# Patient Record
Sex: MALE | Race: WHITE | NOT HISPANIC OR LATINO
[De-identification: names, ages, dates, MRNs, and addresses within clinical notes are randomized per-mention and may not be internally consistent; named-entity substitution may affect disease eponyms.]

---

## 2017-04-17 PROBLEM — Z00.00 ENCOUNTER FOR PREVENTIVE HEALTH EXAMINATION: Status: ACTIVE | Noted: 2017-04-17

## 2017-04-24 ENCOUNTER — APPOINTMENT (OUTPATIENT)
Dept: ORTHOPEDIC SURGERY | Facility: CLINIC | Age: 77
End: 2017-04-24

## 2017-04-24 VITALS — HEIGHT: 70 IN | BODY MASS INDEX: 33.5 KG/M2 | WEIGHT: 234 LBS

## 2017-04-24 VITALS — HEART RATE: 63 BPM | SYSTOLIC BLOOD PRESSURE: 184 MMHG | DIASTOLIC BLOOD PRESSURE: 82 MMHG

## 2017-04-24 DIAGNOSIS — M54.5 LOW BACK PAIN: ICD-10-CM

## 2018-03-27 ENCOUNTER — OUTPATIENT (OUTPATIENT)
Dept: OUTPATIENT SERVICES | Facility: HOSPITAL | Age: 78
LOS: 1 days | End: 2018-03-27
Payer: MEDICARE

## 2018-03-27 DIAGNOSIS — M54.16 RADICULOPATHY, LUMBAR REGION: ICD-10-CM

## 2018-03-27 PROCEDURE — 77003 FLUOROGUIDE FOR SPINE INJECT: CPT

## 2018-03-27 PROCEDURE — 62323 NJX INTERLAMINAR LMBR/SAC: CPT

## 2018-06-11 ENCOUNTER — APPOINTMENT (OUTPATIENT)
Dept: ORTHOPEDIC SURGERY | Facility: CLINIC | Age: 78
End: 2018-06-11
Payer: MEDICARE

## 2018-06-11 PROCEDURE — 99214 OFFICE O/P EST MOD 30 MIN: CPT

## 2018-06-28 ENCOUNTER — CHART COPY (OUTPATIENT)
Age: 78
End: 2018-06-28

## 2018-08-01 ENCOUNTER — OUTPATIENT (OUTPATIENT)
Dept: OUTPATIENT SERVICES | Facility: HOSPITAL | Age: 78
LOS: 1 days | End: 2018-08-01
Payer: MEDICARE

## 2018-08-01 VITALS
WEIGHT: 248.02 LBS | TEMPERATURE: 98 F | HEART RATE: 67 BPM | OXYGEN SATURATION: 97 % | SYSTOLIC BLOOD PRESSURE: 154 MMHG | DIASTOLIC BLOOD PRESSURE: 86 MMHG | HEIGHT: 69.5 IN

## 2018-08-01 DIAGNOSIS — H26.9 UNSPECIFIED CATARACT: Chronic | ICD-10-CM

## 2018-08-01 DIAGNOSIS — R22.9 LOCALIZED SWELLING, MASS AND LUMP, UNSPECIFIED: Chronic | ICD-10-CM

## 2018-08-01 DIAGNOSIS — M51.06 INTERVERTEBRAL DISC DISORDERS WITH MYELOPATHY, LUMBAR REGION: ICD-10-CM

## 2018-08-01 DIAGNOSIS — R06.02 SHORTNESS OF BREATH: ICD-10-CM

## 2018-08-01 DIAGNOSIS — R06.83 SNORING: ICD-10-CM

## 2018-08-01 DIAGNOSIS — M48.07 SPINAL STENOSIS, LUMBOSACRAL REGION: ICD-10-CM

## 2018-08-01 LAB
APTT BLD: 29.6 SEC — SIGNIFICANT CHANGE UP (ref 27.5–37.4)
BLD GP AB SCN SERPL QL: NEGATIVE — SIGNIFICANT CHANGE UP
BUN SERPL-MCNC: 20 MG/DL — SIGNIFICANT CHANGE UP (ref 7–23)
CALCIUM SERPL-MCNC: 9.7 MG/DL — SIGNIFICANT CHANGE UP (ref 8.4–10.5)
CHLORIDE SERPL-SCNC: 106 MMOL/L — SIGNIFICANT CHANGE UP (ref 98–107)
CO2 SERPL-SCNC: 27 MMOL/L — SIGNIFICANT CHANGE UP (ref 22–31)
CREAT SERPL-MCNC: 1.25 MG/DL — SIGNIFICANT CHANGE UP (ref 0.5–1.3)
GLUCOSE SERPL-MCNC: 97 MG/DL — SIGNIFICANT CHANGE UP (ref 70–99)
HCT VFR BLD CALC: 42 % — SIGNIFICANT CHANGE UP (ref 39–50)
HGB BLD-MCNC: 13.5 G/DL — SIGNIFICANT CHANGE UP (ref 13–17)
INR BLD: 1.07 — SIGNIFICANT CHANGE UP (ref 0.88–1.17)
MCHC RBC-ENTMCNC: 29.9 PG — SIGNIFICANT CHANGE UP (ref 27–34)
MCHC RBC-ENTMCNC: 32.1 % — SIGNIFICANT CHANGE UP (ref 32–36)
MCV RBC AUTO: 93.1 FL — SIGNIFICANT CHANGE UP (ref 80–100)
NRBC # FLD: 0 — SIGNIFICANT CHANGE UP
PLATELET # BLD AUTO: 186 K/UL — SIGNIFICANT CHANGE UP (ref 150–400)
PMV BLD: 11 FL — SIGNIFICANT CHANGE UP (ref 7–13)
POTASSIUM SERPL-MCNC: 5 MMOL/L — SIGNIFICANT CHANGE UP (ref 3.5–5.3)
POTASSIUM SERPL-SCNC: 5 MMOL/L — SIGNIFICANT CHANGE UP (ref 3.5–5.3)
PROTHROM AB SERPL-ACNC: 12.3 SEC — SIGNIFICANT CHANGE UP (ref 9.8–13.1)
RBC # BLD: 4.51 M/UL — SIGNIFICANT CHANGE UP (ref 4.2–5.8)
RBC # FLD: 13.2 % — SIGNIFICANT CHANGE UP (ref 10.3–14.5)
RH IG SCN BLD-IMP: POSITIVE — SIGNIFICANT CHANGE UP
SODIUM SERPL-SCNC: 144 MMOL/L — SIGNIFICANT CHANGE UP (ref 135–145)
WBC # BLD: 6.78 K/UL — SIGNIFICANT CHANGE UP (ref 3.8–10.5)
WBC # FLD AUTO: 6.78 K/UL — SIGNIFICANT CHANGE UP (ref 3.8–10.5)

## 2018-08-01 PROCEDURE — 93010 ELECTROCARDIOGRAM REPORT: CPT

## 2018-08-01 NOTE — H&P PST ADULT - FAMILY HISTORY
Father  Still living? No  Family history of myocardial infarction, Age at diagnosis: Age Unknown     Mother  Still living? No  Family history of Alzheimer's disease, Age at diagnosis: Age Unknown

## 2018-08-01 NOTE — H&P PST ADULT - PMH
Disc disease, degenerative, lumbar or lumbosacral  2018  GERD (gastroesophageal reflux disease)    Hard of hearing  b/l hearing aids  Hypercholesterolemia    Hypertension    Obesity    Snoring  MARCOS precautions -- responds affirmatively to STOP BANG questionnaire Cataracts, bilateral    Disc disease, degenerative, lumbar or lumbosacral  2018  GERD (gastroesophageal reflux disease)    Hard of hearing  b/l hearing aids  Hypercholesterolemia    Hypertension    Obesity    Snoring  MARCOS precautions -- responds affirmatively to STOP BANG questionnaire

## 2018-08-01 NOTE — H&P PST ADULT - PSH
Mass  excision of laryngeal mass Cataracts, both eyes  have lenses  Mass  excision of laryngeal mass

## 2018-08-01 NOTE — H&P PST ADULT - NSANTHOSAYNRD_GEN_A_CORE
No. MARCOS screening performed.  STOP BANG Legend: 0-2 = LOW Risk; 3-4 = INTERMEDIATE Risk; 5-8 = HIGH Risk

## 2018-08-01 NOTE — H&P PST ADULT - LYMPHATIC
supraclavicular R/supraclavicular L/posterior cervical R/posterior cervical L/anterior cervical L/anterior cervical R

## 2018-08-01 NOTE — H&P PST ADULT - PROBLEM SELECTOR PLAN 3
Await cardiac evaluation which is to be arranged by pcp  Need to notify surgeon of pre op cardiac evaluation  * Instructed to take normal am dose of omeprazole the am of surgery  * Last aspirin on 7-31-18 Await cardiac evaluation which is to be arranged by pcp  Need to notify surgeon of pre op cardiac evaluation--Kellie in surgeon's office informed of preop cardiac evaluation  * Instructed to take normal am dose of omeprazole the am of surgery  * Last aspirin on 7-31-18

## 2018-08-01 NOTE — H&P PST ADULT - PROBLEM SELECTOR PLAN 1
This is a 77 y/o male who is scheduled for L2--5 posterior lumbar laminectomy on 8-16-18  * Given pre op scrub cleanser

## 2018-08-01 NOTE — H&P PST ADULT - HISTORY OF PRESENT ILLNESS
This is a 77 y/o male who presents with progressively worsening symptomatolgy of lumbar disc disease. Xrays and MR confirm pathology. Scheduled for L2-5 posterior lumbar laminectomy on 8-16-18

## 2018-08-02 LAB — SPECIMEN SOURCE: SIGNIFICANT CHANGE UP

## 2018-08-03 PROBLEM — M51.37 OTHER INTERVERTEBRAL DISC DEGENERATION, LUMBOSACRAL REGION: Chronic | Status: ACTIVE | Noted: 2018-08-01

## 2018-08-03 PROBLEM — K21.9 GASTRO-ESOPHAGEAL REFLUX DISEASE WITHOUT ESOPHAGITIS: Chronic | Status: ACTIVE | Noted: 2018-08-01

## 2018-08-03 PROBLEM — E78.00 PURE HYPERCHOLESTEROLEMIA, UNSPECIFIED: Chronic | Status: ACTIVE | Noted: 2018-08-01

## 2018-08-03 PROBLEM — E66.9 OBESITY, UNSPECIFIED: Chronic | Status: ACTIVE | Noted: 2018-08-01

## 2018-08-03 PROBLEM — R06.83 SNORING: Chronic | Status: ACTIVE | Noted: 2018-08-01

## 2018-08-03 PROBLEM — H26.9 UNSPECIFIED CATARACT: Chronic | Status: ACTIVE | Noted: 2018-08-01

## 2018-08-03 PROBLEM — I10 ESSENTIAL (PRIMARY) HYPERTENSION: Chronic | Status: ACTIVE | Noted: 2018-08-01

## 2018-08-03 PROBLEM — H91.90 UNSPECIFIED HEARING LOSS, UNSPECIFIED EAR: Chronic | Status: ACTIVE | Noted: 2018-08-01

## 2018-08-04 LAB — BACTERIA NPH CULT: SIGNIFICANT CHANGE UP

## 2018-08-07 ENCOUNTER — NON-APPOINTMENT (OUTPATIENT)
Age: 78
End: 2018-08-07

## 2018-08-07 ENCOUNTER — APPOINTMENT (OUTPATIENT)
Dept: CARDIOLOGY | Facility: CLINIC | Age: 78
End: 2018-08-07
Payer: MEDICARE

## 2018-08-07 VITALS
WEIGHT: 250 LBS | SYSTOLIC BLOOD PRESSURE: 176 MMHG | OXYGEN SATURATION: 96 % | HEART RATE: 86 BPM | DIASTOLIC BLOOD PRESSURE: 80 MMHG | HEIGHT: 70 IN | BODY MASS INDEX: 35.79 KG/M2

## 2018-08-07 DIAGNOSIS — Z82.49 FAMILY HISTORY OF ISCHEMIC HEART DISEASE AND OTHER DISEASES OF THE CIRCULATORY SYSTEM: ICD-10-CM

## 2018-08-07 DIAGNOSIS — Z87.891 PERSONAL HISTORY OF NICOTINE DEPENDENCE: ICD-10-CM

## 2018-08-07 PROCEDURE — 93000 ELECTROCARDIOGRAM COMPLETE: CPT

## 2018-08-07 PROCEDURE — 99205 OFFICE O/P NEW HI 60 MIN: CPT

## 2018-08-07 RX ORDER — OMEPRAZOLE 20 MG/1
TABLET, DELAYED RELEASE ORAL
Refills: 0 | Status: ACTIVE | COMMUNITY

## 2018-08-07 RX ORDER — LOSARTAN POTASSIUM AND HYDROCHLOROTHIAZIDE 12.5; 1 MG/1; MG/1
100-12.5 TABLET ORAL
Refills: 0 | Status: ACTIVE | COMMUNITY

## 2018-08-08 ENCOUNTER — APPOINTMENT (OUTPATIENT)
Dept: CARDIOLOGY | Facility: CLINIC | Age: 78
End: 2018-08-08
Payer: MEDICARE

## 2018-08-08 PROCEDURE — 93015 CV STRESS TEST SUPVJ I&R: CPT

## 2018-08-08 PROCEDURE — 78452 HT MUSCLE IMAGE SPECT MULT: CPT

## 2018-08-08 PROCEDURE — A9500: CPT

## 2018-08-13 ENCOUNTER — CHART COPY (OUTPATIENT)
Age: 78
End: 2018-08-13

## 2018-08-13 ENCOUNTER — APPOINTMENT (OUTPATIENT)
Dept: ORTHOPEDIC SURGERY | Facility: CLINIC | Age: 78
End: 2018-08-13
Payer: MEDICARE

## 2018-08-13 VITALS
WEIGHT: 250 LBS | DIASTOLIC BLOOD PRESSURE: 70 MMHG | HEIGHT: 70 IN | SYSTOLIC BLOOD PRESSURE: 181 MMHG | HEART RATE: 71 BPM | BODY MASS INDEX: 35.79 KG/M2

## 2018-08-13 PROCEDURE — 99214 OFFICE O/P EST MOD 30 MIN: CPT | Mod: PD

## 2018-08-15 ENCOUNTER — TRANSCRIPTION ENCOUNTER (OUTPATIENT)
Age: 78
End: 2018-08-15

## 2018-08-15 NOTE — ASU PATIENT PROFILE, ADULT - PMH
Cataracts, bilateral    Disc disease, degenerative, lumbar or lumbosacral  2018  GERD (gastroesophageal reflux disease)    Hard of hearing  b/l hearing aids  Hypercholesterolemia    Hypertension    Obesity    Snoring  MARCOS precautions -- responds affirmatively to STOP BANG questionnaire

## 2018-08-15 NOTE — ASU PATIENT PROFILE, ADULT - NSALCOHOLLASTUSE_GEN_A_CORE_DT
11-Aug-2018 Product 4 Application Directions: Apply to affected affected area twice daily as needed.

## 2018-08-16 ENCOUNTER — RESULT REVIEW (OUTPATIENT)
Age: 78
End: 2018-08-16

## 2018-08-16 ENCOUNTER — APPOINTMENT (OUTPATIENT)
Dept: ORTHOPEDIC SURGERY | Facility: HOSPITAL | Age: 78
End: 2018-08-16
Payer: MEDICARE

## 2018-08-16 ENCOUNTER — INPATIENT (INPATIENT)
Facility: HOSPITAL | Age: 78
LOS: 9 days | Discharge: HOME HEALTH | End: 2018-08-26
Attending: STUDENT IN AN ORGANIZED HEALTH CARE EDUCATION/TRAINING PROGRAM | Admitting: STUDENT IN AN ORGANIZED HEALTH CARE EDUCATION/TRAINING PROGRAM
Payer: MEDICARE

## 2018-08-16 VITALS
RESPIRATION RATE: 16 BRPM | HEIGHT: 69.5 IN | OXYGEN SATURATION: 97 % | TEMPERATURE: 98 F | WEIGHT: 248.02 LBS | SYSTOLIC BLOOD PRESSURE: 149 MMHG | HEART RATE: 86 BPM | DIASTOLIC BLOOD PRESSURE: 76 MMHG

## 2018-08-16 DIAGNOSIS — H26.9 UNSPECIFIED CATARACT: Chronic | ICD-10-CM

## 2018-08-16 DIAGNOSIS — M48.07 SPINAL STENOSIS, LUMBOSACRAL REGION: ICD-10-CM

## 2018-08-16 DIAGNOSIS — R22.9 LOCALIZED SWELLING, MASS AND LUMP, UNSPECIFIED: Chronic | ICD-10-CM

## 2018-08-16 LAB
BASOPHILS # BLD AUTO: 0.03 K/UL — SIGNIFICANT CHANGE UP (ref 0–0.2)
BASOPHILS NFR BLD AUTO: 0.3 % — SIGNIFICANT CHANGE UP (ref 0–2)
BUN SERPL-MCNC: 20 MG/DL — SIGNIFICANT CHANGE UP (ref 7–23)
CALCIUM SERPL-MCNC: 8.8 MG/DL — SIGNIFICANT CHANGE UP (ref 8.4–10.5)
CHLORIDE SERPL-SCNC: 105 MMOL/L — SIGNIFICANT CHANGE UP (ref 98–107)
CO2 SERPL-SCNC: 23 MMOL/L — SIGNIFICANT CHANGE UP (ref 22–31)
CREAT SERPL-MCNC: 1.15 MG/DL — SIGNIFICANT CHANGE UP (ref 0.5–1.3)
EOSINOPHIL # BLD AUTO: 0.04 K/UL — SIGNIFICANT CHANGE UP (ref 0–0.5)
EOSINOPHIL NFR BLD AUTO: 0.4 % — SIGNIFICANT CHANGE UP (ref 0–6)
GLUCOSE SERPL-MCNC: 139 MG/DL — HIGH (ref 70–99)
HCT VFR BLD CALC: 34.5 % — LOW (ref 39–50)
HGB BLD-MCNC: 11.2 G/DL — LOW (ref 13–17)
IMM GRANULOCYTES # BLD AUTO: 0.05 # — SIGNIFICANT CHANGE UP
IMM GRANULOCYTES NFR BLD AUTO: 0.5 % — SIGNIFICANT CHANGE UP (ref 0–1.5)
LYMPHOCYTES # BLD AUTO: 0.75 K/UL — LOW (ref 1–3.3)
LYMPHOCYTES # BLD AUTO: 7.1 % — LOW (ref 13–44)
MCHC RBC-ENTMCNC: 30.1 PG — SIGNIFICANT CHANGE UP (ref 27–34)
MCHC RBC-ENTMCNC: 32.5 % — SIGNIFICANT CHANGE UP (ref 32–36)
MCV RBC AUTO: 92.7 FL — SIGNIFICANT CHANGE UP (ref 80–100)
MONOCYTES # BLD AUTO: 0.27 K/UL — SIGNIFICANT CHANGE UP (ref 0–0.9)
MONOCYTES NFR BLD AUTO: 2.6 % — SIGNIFICANT CHANGE UP (ref 2–14)
NEUTROPHILS # BLD AUTO: 9.35 K/UL — HIGH (ref 1.8–7.4)
NEUTROPHILS NFR BLD AUTO: 89.1 % — HIGH (ref 43–77)
NRBC # FLD: 0 — SIGNIFICANT CHANGE UP
PLATELET # BLD AUTO: 160 K/UL — SIGNIFICANT CHANGE UP (ref 150–400)
PMV BLD: 10.6 FL — SIGNIFICANT CHANGE UP (ref 7–13)
POTASSIUM SERPL-MCNC: 3.9 MMOL/L — SIGNIFICANT CHANGE UP (ref 3.5–5.3)
POTASSIUM SERPL-SCNC: 3.9 MMOL/L — SIGNIFICANT CHANGE UP (ref 3.5–5.3)
RBC # BLD: 3.72 M/UL — LOW (ref 4.2–5.8)
RBC # FLD: 13.4 % — SIGNIFICANT CHANGE UP (ref 10.3–14.5)
RH IG SCN BLD-IMP: POSITIVE — SIGNIFICANT CHANGE UP
SODIUM SERPL-SCNC: 140 MMOL/L — SIGNIFICANT CHANGE UP (ref 135–145)
WBC # BLD: 10.49 K/UL — SIGNIFICANT CHANGE UP (ref 3.8–10.5)
WBC # FLD AUTO: 10.49 K/UL — SIGNIFICANT CHANGE UP (ref 3.8–10.5)

## 2018-08-16 PROCEDURE — 72100 X-RAY EXAM L-S SPINE 2/3 VWS: CPT | Mod: 26

## 2018-08-16 PROCEDURE — 63030 LAMOT DCMPRN NRV RT 1 LMBR: CPT | Mod: 80,59

## 2018-08-16 PROCEDURE — 63047 LAM FACETEC & FORAMOT LUMBAR: CPT

## 2018-08-16 PROCEDURE — 63047 LAM FACETEC & FORAMOT LUMBAR: CPT | Mod: 80

## 2018-08-16 PROCEDURE — 63048 LAM FACETEC &FORAMOT EA ADDL: CPT

## 2018-08-16 PROCEDURE — 63048 LAM FACETEC &FORAMOT EA ADDL: CPT | Mod: 80

## 2018-08-16 PROCEDURE — 88304 TISSUE EXAM BY PATHOLOGIST: CPT | Mod: 26

## 2018-08-16 PROCEDURE — 63030 LAMOT DCMPRN NRV RT 1 LMBR: CPT | Mod: 59

## 2018-08-16 PROCEDURE — 88311 DECALCIFY TISSUE: CPT | Mod: 26

## 2018-08-16 RX ORDER — ATORVASTATIN CALCIUM 80 MG/1
20 TABLET, FILM COATED ORAL AT BEDTIME
Qty: 0 | Refills: 0 | Status: DISCONTINUED | OUTPATIENT
Start: 2018-08-16 | End: 2018-08-26

## 2018-08-16 RX ORDER — OXYCODONE HYDROCHLORIDE 5 MG/1
10 TABLET ORAL EVERY 4 HOURS
Qty: 0 | Refills: 0 | Status: DISCONTINUED | OUTPATIENT
Start: 2018-08-16 | End: 2018-08-20

## 2018-08-16 RX ORDER — DOCUSATE SODIUM 100 MG
100 CAPSULE ORAL THREE TIMES A DAY
Qty: 0 | Refills: 0 | Status: DISCONTINUED | OUTPATIENT
Start: 2018-08-16 | End: 2018-08-26

## 2018-08-16 RX ORDER — LOSARTAN POTASSIUM 100 MG/1
50 TABLET, FILM COATED ORAL DAILY
Qty: 0 | Refills: 0 | Status: DISCONTINUED | OUTPATIENT
Start: 2018-08-16 | End: 2018-08-19

## 2018-08-16 RX ORDER — PANTOPRAZOLE SODIUM 20 MG/1
40 TABLET, DELAYED RELEASE ORAL
Qty: 0 | Refills: 0 | Status: DISCONTINUED | OUTPATIENT
Start: 2018-08-16 | End: 2018-08-26

## 2018-08-16 RX ORDER — ACETAMINOPHEN 500 MG
650 TABLET ORAL EVERY 6 HOURS
Qty: 0 | Refills: 0 | Status: DISCONTINUED | OUTPATIENT
Start: 2018-08-16 | End: 2018-08-23

## 2018-08-16 RX ORDER — MAGNESIUM HYDROXIDE 400 MG/1
30 TABLET, CHEWABLE ORAL EVERY 12 HOURS
Qty: 0 | Refills: 0 | Status: DISCONTINUED | OUTPATIENT
Start: 2018-08-16 | End: 2018-08-26

## 2018-08-16 RX ORDER — SODIUM CHLORIDE 9 MG/ML
1000 INJECTION, SOLUTION INTRAVENOUS
Qty: 0 | Refills: 0 | Status: DISCONTINUED | OUTPATIENT
Start: 2018-08-16 | End: 2018-08-19

## 2018-08-16 RX ORDER — CEFAZOLIN SODIUM 1 G
2000 VIAL (EA) INJECTION EVERY 8 HOURS
Qty: 0 | Refills: 0 | Status: COMPLETED | OUTPATIENT
Start: 2018-08-16 | End: 2018-08-17

## 2018-08-16 RX ORDER — HYDROMORPHONE HYDROCHLORIDE 2 MG/ML
0.25 INJECTION INTRAMUSCULAR; INTRAVENOUS; SUBCUTANEOUS
Qty: 0 | Refills: 0 | Status: DISCONTINUED | OUTPATIENT
Start: 2018-08-16 | End: 2018-08-16

## 2018-08-16 RX ORDER — HYDROCHLOROTHIAZIDE 25 MG
12.5 TABLET ORAL DAILY
Qty: 0 | Refills: 0 | Status: DISCONTINUED | OUTPATIENT
Start: 2018-08-16 | End: 2018-08-19

## 2018-08-16 RX ORDER — SENNA PLUS 8.6 MG/1
2 TABLET ORAL AT BEDTIME
Qty: 0 | Refills: 0 | Status: DISCONTINUED | OUTPATIENT
Start: 2018-08-16 | End: 2018-08-21

## 2018-08-16 RX ORDER — CHOLECALCIFEROL (VITAMIN D3) 125 MCG
2000 CAPSULE ORAL DAILY
Qty: 0 | Refills: 0 | Status: DISCONTINUED | OUTPATIENT
Start: 2018-08-16 | End: 2018-08-26

## 2018-08-16 RX ORDER — OXYCODONE HYDROCHLORIDE 5 MG/1
5 TABLET ORAL EVERY 4 HOURS
Qty: 0 | Refills: 0 | Status: DISCONTINUED | OUTPATIENT
Start: 2018-08-16 | End: 2018-08-20

## 2018-08-16 RX ORDER — ONDANSETRON 8 MG/1
4 TABLET, FILM COATED ORAL ONCE
Qty: 0 | Refills: 0 | Status: DISCONTINUED | OUTPATIENT
Start: 2018-08-16 | End: 2018-08-16

## 2018-08-16 RX ORDER — ACETAMINOPHEN 500 MG
650 TABLET ORAL EVERY 6 HOURS
Qty: 0 | Refills: 0 | Status: DISCONTINUED | OUTPATIENT
Start: 2018-08-16 | End: 2018-08-26

## 2018-08-16 RX ADMIN — ATORVASTATIN CALCIUM 20 MILLIGRAM(S): 80 TABLET, FILM COATED ORAL at 22:25

## 2018-08-16 RX ADMIN — OXYCODONE HYDROCHLORIDE 5 MILLIGRAM(S): 5 TABLET ORAL at 23:03

## 2018-08-16 RX ADMIN — LOSARTAN POTASSIUM 50 MILLIGRAM(S): 100 TABLET, FILM COATED ORAL at 22:25

## 2018-08-16 RX ADMIN — Medication 12.5 MILLIGRAM(S): at 22:25

## 2018-08-16 RX ADMIN — Medication 100 MILLIGRAM(S): at 22:25

## 2018-08-16 RX ADMIN — OXYCODONE HYDROCHLORIDE 5 MILLIGRAM(S): 5 TABLET ORAL at 23:40

## 2018-08-16 RX ADMIN — SENNA PLUS 2 TABLET(S): 8.6 TABLET ORAL at 22:25

## 2018-08-16 NOTE — BRIEF OPERATIVE NOTE - PROCEDURE
<<-----Click on this checkbox to enter Procedure Discectomy, spine, 1 level  08/16/2018    Active  ELI  Laminectomy of lumbar spine by posterior approach  08/16/2018    Active  ELI

## 2018-08-16 NOTE — PROGRESS NOTE ADULT - SUBJECTIVE AND OBJECTIVE BOX
Patient appears well recovered from anesthesia. Pain well controlled with pain medications.    Vital Signs Last 24 Hrs  T(C): 36.5 (16 Aug 2018 19:45), Max: 36.5 (16 Aug 2018 12:48)  T(F): 97.7 (16 Aug 2018 19:45), Max: 97.7 (16 Aug 2018 12:48)  HR: 67 (16 Aug 2018 22:15) (67 - 86)  BP: 134/75 (16 Aug 2018 22:00) (124/62 - 158/72)  BP(mean): 96 (16 Aug 2018 21:15) (78 - 106)  RR: 20 (16 Aug 2018 22:15) (12 - 25)  SpO2: 96% (16 Aug 2018 22:15) (96% - 99%)    Exam:  Gen: NAD  Motor: 5/5 EHL/FHL/TA/Gastrocnemius  Sensory: SILT DP/SP/S/S/T nerve distributions  Dressing: Clean, Dry, Intact    A/P: 78 year old male s/p L2-5 laminectomy, L3-4 Discectomy  - Pain Control  - Regular Diet  - PT/OT, OOB  - WBAT  - Monitor HV

## 2018-08-17 ENCOUNTER — TRANSCRIPTION ENCOUNTER (OUTPATIENT)
Age: 78
End: 2018-08-17

## 2018-08-17 DIAGNOSIS — I10 ESSENTIAL (PRIMARY) HYPERTENSION: ICD-10-CM

## 2018-08-17 DIAGNOSIS — E78.00 PURE HYPERCHOLESTEROLEMIA, UNSPECIFIED: ICD-10-CM

## 2018-08-17 DIAGNOSIS — I95.1 ORTHOSTATIC HYPOTENSION: ICD-10-CM

## 2018-08-17 DIAGNOSIS — M51.37 OTHER INTERVERTEBRAL DISC DEGENERATION, LUMBOSACRAL REGION: ICD-10-CM

## 2018-08-17 DIAGNOSIS — K21.9 GASTRO-ESOPHAGEAL REFLUX DISEASE WITHOUT ESOPHAGITIS: ICD-10-CM

## 2018-08-17 LAB
BASOPHILS # BLD AUTO: 0.01 K/UL — SIGNIFICANT CHANGE UP (ref 0–0.2)
BASOPHILS NFR BLD AUTO: 0.1 % — SIGNIFICANT CHANGE UP (ref 0–2)
BUN SERPL-MCNC: 18 MG/DL — SIGNIFICANT CHANGE UP (ref 7–23)
CALCIUM SERPL-MCNC: 9.4 MG/DL — SIGNIFICANT CHANGE UP (ref 8.4–10.5)
CHLORIDE SERPL-SCNC: 101 MMOL/L — SIGNIFICANT CHANGE UP (ref 98–107)
CO2 SERPL-SCNC: 27 MMOL/L — SIGNIFICANT CHANGE UP (ref 22–31)
CREAT SERPL-MCNC: 1.09 MG/DL — SIGNIFICANT CHANGE UP (ref 0.5–1.3)
EOSINOPHIL # BLD AUTO: 0 K/UL — SIGNIFICANT CHANGE UP (ref 0–0.5)
EOSINOPHIL NFR BLD AUTO: 0 % — SIGNIFICANT CHANGE UP (ref 0–6)
GLUCOSE SERPL-MCNC: 145 MG/DL — HIGH (ref 70–99)
HCT VFR BLD CALC: 31.7 % — LOW (ref 39–50)
HCT VFR BLD CALC: 33.6 % — LOW (ref 39–50)
HGB BLD-MCNC: 10.3 G/DL — LOW (ref 13–17)
HGB BLD-MCNC: 10.9 G/DL — LOW (ref 13–17)
IMM GRANULOCYTES # BLD AUTO: 0.04 # — SIGNIFICANT CHANGE UP
IMM GRANULOCYTES NFR BLD AUTO: 0.3 % — SIGNIFICANT CHANGE UP (ref 0–1.5)
LYMPHOCYTES # BLD AUTO: 0.62 K/UL — LOW (ref 1–3.3)
LYMPHOCYTES # BLD AUTO: 4.9 % — LOW (ref 13–44)
MCHC RBC-ENTMCNC: 30.5 PG — SIGNIFICANT CHANGE UP (ref 27–34)
MCHC RBC-ENTMCNC: 30.8 PG — SIGNIFICANT CHANGE UP (ref 27–34)
MCHC RBC-ENTMCNC: 32.4 % — SIGNIFICANT CHANGE UP (ref 32–36)
MCHC RBC-ENTMCNC: 32.5 % — SIGNIFICANT CHANGE UP (ref 32–36)
MCV RBC AUTO: 94.1 FL — SIGNIFICANT CHANGE UP (ref 80–100)
MCV RBC AUTO: 94.9 FL — SIGNIFICANT CHANGE UP (ref 80–100)
MONOCYTES # BLD AUTO: 0.59 K/UL — SIGNIFICANT CHANGE UP (ref 0–0.9)
MONOCYTES NFR BLD AUTO: 4.7 % — SIGNIFICANT CHANGE UP (ref 2–14)
NEUTROPHILS # BLD AUTO: 11.42 K/UL — HIGH (ref 1.8–7.4)
NEUTROPHILS NFR BLD AUTO: 90 % — HIGH (ref 43–77)
NRBC # FLD: 0 — SIGNIFICANT CHANGE UP
NRBC # FLD: 0 — SIGNIFICANT CHANGE UP
PLATELET # BLD AUTO: 169 K/UL — SIGNIFICANT CHANGE UP (ref 150–400)
PLATELET # BLD AUTO: 173 K/UL — SIGNIFICANT CHANGE UP (ref 150–400)
PMV BLD: 10.7 FL — SIGNIFICANT CHANGE UP (ref 7–13)
PMV BLD: 10.8 FL — SIGNIFICANT CHANGE UP (ref 7–13)
POTASSIUM SERPL-MCNC: 4.6 MMOL/L — SIGNIFICANT CHANGE UP (ref 3.5–5.3)
POTASSIUM SERPL-SCNC: 4.6 MMOL/L — SIGNIFICANT CHANGE UP (ref 3.5–5.3)
RBC # BLD: 3.34 M/UL — LOW (ref 4.2–5.8)
RBC # BLD: 3.57 M/UL — LOW (ref 4.2–5.8)
RBC # FLD: 13.2 % — SIGNIFICANT CHANGE UP (ref 10.3–14.5)
RBC # FLD: 13.4 % — SIGNIFICANT CHANGE UP (ref 10.3–14.5)
SODIUM SERPL-SCNC: 139 MMOL/L — SIGNIFICANT CHANGE UP (ref 135–145)
TROPONIN T, HIGH SENSITIVITY: 25 NG/L — SIGNIFICANT CHANGE UP (ref ?–14)
TROPONIN T, HIGH SENSITIVITY: 26 NG/L — SIGNIFICANT CHANGE UP (ref ?–14)
TROPONIN T, HIGH SENSITIVITY: 26 NG/L — SIGNIFICANT CHANGE UP (ref ?–14)
WBC # BLD: 12.68 K/UL — HIGH (ref 3.8–10.5)
WBC # BLD: 16.59 K/UL — HIGH (ref 3.8–10.5)
WBC # FLD AUTO: 12.68 K/UL — HIGH (ref 3.8–10.5)
WBC # FLD AUTO: 16.59 K/UL — HIGH (ref 3.8–10.5)

## 2018-08-17 PROCEDURE — 99223 1ST HOSP IP/OBS HIGH 75: CPT | Mod: AI

## 2018-08-17 RX ORDER — SODIUM CHLORIDE 9 MG/ML
1000 INJECTION, SOLUTION INTRAVENOUS ONCE
Qty: 0 | Refills: 0 | Status: COMPLETED | OUTPATIENT
Start: 2018-08-17 | End: 2018-08-17

## 2018-08-17 RX ORDER — HYDROMORPHONE HYDROCHLORIDE 2 MG/ML
0.5 INJECTION INTRAMUSCULAR; INTRAVENOUS; SUBCUTANEOUS ONCE
Qty: 0 | Refills: 0 | Status: DISCONTINUED | OUTPATIENT
Start: 2018-08-17 | End: 2018-08-17

## 2018-08-17 RX ORDER — ACETAMINOPHEN 500 MG
1000 TABLET ORAL ONCE
Qty: 0 | Refills: 0 | Status: COMPLETED | OUTPATIENT
Start: 2018-08-17 | End: 2018-08-17

## 2018-08-17 RX ADMIN — OXYCODONE HYDROCHLORIDE 5 MILLIGRAM(S): 5 TABLET ORAL at 13:43

## 2018-08-17 RX ADMIN — Medication 100 MILLIGRAM(S): at 06:30

## 2018-08-17 RX ADMIN — OXYCODONE HYDROCHLORIDE 10 MILLIGRAM(S): 5 TABLET ORAL at 18:22

## 2018-08-17 RX ADMIN — Medication 100 MILLIGRAM(S): at 00:06

## 2018-08-17 RX ADMIN — Medication 400 MILLIGRAM(S): at 11:51

## 2018-08-17 RX ADMIN — OXYCODONE HYDROCHLORIDE 10 MILLIGRAM(S): 5 TABLET ORAL at 03:46

## 2018-08-17 RX ADMIN — OXYCODONE HYDROCHLORIDE 10 MILLIGRAM(S): 5 TABLET ORAL at 04:45

## 2018-08-17 RX ADMIN — HYDROMORPHONE HYDROCHLORIDE 0.5 MILLIGRAM(S): 2 INJECTION INTRAMUSCULAR; INTRAVENOUS; SUBCUTANEOUS at 20:26

## 2018-08-17 RX ADMIN — OXYCODONE HYDROCHLORIDE 5 MILLIGRAM(S): 5 TABLET ORAL at 14:13

## 2018-08-17 RX ADMIN — SENNA PLUS 2 TABLET(S): 8.6 TABLET ORAL at 21:38

## 2018-08-17 RX ADMIN — Medication 100 MILLIGRAM(S): at 13:43

## 2018-08-17 RX ADMIN — Medication 1000 MILLIGRAM(S): at 12:10

## 2018-08-17 RX ADMIN — PANTOPRAZOLE SODIUM 40 MILLIGRAM(S): 20 TABLET, DELAYED RELEASE ORAL at 06:30

## 2018-08-17 RX ADMIN — HYDROMORPHONE HYDROCHLORIDE 0.5 MILLIGRAM(S): 2 INJECTION INTRAMUSCULAR; INTRAVENOUS; SUBCUTANEOUS at 20:40

## 2018-08-17 RX ADMIN — Medication 100 MILLIGRAM(S): at 06:31

## 2018-08-17 RX ADMIN — Medication 100 MILLIGRAM(S): at 21:38

## 2018-08-17 RX ADMIN — Medication 2000 UNIT(S): at 13:43

## 2018-08-17 RX ADMIN — OXYCODONE HYDROCHLORIDE 10 MILLIGRAM(S): 5 TABLET ORAL at 18:52

## 2018-08-17 RX ADMIN — SODIUM CHLORIDE 125 MILLILITER(S): 9 INJECTION, SOLUTION INTRAVENOUS at 18:22

## 2018-08-17 RX ADMIN — SODIUM CHLORIDE 1000 MILLILITER(S): 9 INJECTION, SOLUTION INTRAVENOUS at 09:50

## 2018-08-17 RX ADMIN — ATORVASTATIN CALCIUM 20 MILLIGRAM(S): 80 TABLET, FILM COATED ORAL at 21:38

## 2018-08-17 NOTE — OCCUPATIONAL THERAPY INITIAL EVALUATION ADULT - IMPAIRED TRANSFERS: SIT/STAND, REHAB EVAL
Pt endorsing dizziness. Pt immediately returned to supine position. BP 92/49. RN ARNOLDO Nash made aware./decreased strength/impaired balance

## 2018-08-17 NOTE — CONSULT NOTE ADULT - PROBLEM SELECTOR RECOMMENDATION 2
due to post op volume depletion  Start lactated ringers @ 125ml/hr  repeat orthostatics in a.m.  monitoring H/H for acute blood loss anemia

## 2018-08-17 NOTE — DISCHARGE NOTE ADULT - INSTRUCTIONS
Physical Exam  Mallampati: II  TM Distance: >3 FB  Neck ROM: Full  cardiovascular exam normal  pulmonary exam normal  abdominal exam normal  dental exam normal      Anesthesia Plan  ASA Status: 2  Anesthesia Type: MAC  Reviewed: Past Med History, Medications, Problem List, Allergies, Lab Results, Nursing Notes, Pre-Induction Reassessment, Beta Blocker Status, NPO Status, DNR Status, Patient Summary and Consultations  The proposed anesthetic plan, including its risks and benefits, have been discussed with the Patient - along with the risks and benefits of alternatives.  Questions were encouraged and answered and the patient and/or representative agrees to proceed.  Blood Products: Not Anticipated      Anesthesia ROS/Med Hx        Cardiovascular Review:    Pt. positive for hyperlipidemia    End/Other Review:    Pt. positive for hypothyroidism  Overall Review of Systems Comments:  No changes in health from last surgery this month.     Return to diet from prior to surgery Notify Dr Villalba if you experience any increase in pain not relieved with pain medication, any redness, drainage or swelling around incision or if you develop a fever >100.5.  No heavy lifting or straining.  Maintain proper body alignment, no bending or twisting at the waist.  Do not sit in a chair for longer than 45 minutes at a time.  Notify Dr Villalba of any numbness or tingling in extremities.  Drink plenty of fluids.  Use over the counter stool softeners to assist with constipation which can be a side effect of your pain medication.

## 2018-08-17 NOTE — DISCHARGE NOTE ADULT - CARE PROVIDER_API CALL
Jared Villalba), Orthopaedic Surgery  [address] [place] [address]  Phone: [phone]  Fax: [phone]

## 2018-08-17 NOTE — PROVIDER CONTACT NOTE (OTHER) - SITUATION
pt attempted oob with pt, pt had cold sweat and got dizzy, bp 90s systolic, pt put back into bed and bp now 82/40

## 2018-08-17 NOTE — OCCUPATIONAL THERAPY INITIAL EVALUATION ADULT - PERTINENT HX OF CURRENT PROBLEM, REHAB EVAL
77 y/o male who presents with progressively worsening symptomatolgy of lumbar disc disease. X-rays and MR confirm pathology. Pt now s/p L2-5 laminectomy, L3/4 discectomy.

## 2018-08-17 NOTE — DISCHARGE NOTE ADULT - PATIENT PORTAL LINK FT
You can access the [facility] Portal, offered by [facility], by registering with the following website: [email or web address]

## 2018-08-17 NOTE — DISCHARGE NOTE ADULT - CARE PLAN
Principal Discharge DX:	Disc disease with myelopathy, lumbar  Goal:	ambulation, pain control, ADLs  Assessment and plan of treatment:	77 yo male with myelopathy, admitted to the orthopedic department for elective surgery.  Patient medically optimized and underwent a L2-5 lami with L3-4 disc on 8/16 by Dr Villalba with no annie-op complications. Post op, patient put in a brace and made weight bear as tolerated.  Patient doing well in PT, OT, pain controlled, labs and vitals stable, incision clean and dry.  As per Dr Villalba, patient stable for discharge.  Keep surgical site clean and dry.  No heavy lifting, bending or twisting until further notice.  Do NOT take any blood thinners until further notified by attending (no Advil, motrin, aleve, aspirin, etc).  As per Dr Villalba, patient to call office within 7-10 days to make follow up appointment and call PMD within 2 weeks for continuity of care as medications may have been adjusted during hospital stay.  Notify ortho with any questions or concerns. Principal Discharge DX:	Disc disease with myelopathy, lumbar  Goal:	ambulation, pain control, ADLs  Assessment and plan of treatment:	79 yo maleadmitted to the orthopedic department for elective surgery.  Patient medically optimized and underwent a L2-5 lami with L3-4 disc on 8/16 by Dr Villalba with no annie-op complications. Post op, patient put in a brace and made weight bear as tolerated. Was found to have a post op ileus. Made NPO and seen by General Surgery for management and care. Once resolved, patient advanced to regular diet which was well tolerated prior to discharge. Patient doing well in PT, OT, pain controlled, labs and vitals stable, incision clean and dry.  As per Dr Villalba, patient stable for discharge.  Keep surgical site clean and dry.  No heavy lifting, bending or twisting until further notice.  Do NOT take any blood thinners until further notified by attending (no Advil, motrin, aleve, aspirin, etc).  As per Dr Villalba, patient to call office within 7-10 days to make follow up appointment and call PMD within 2 weeks for continuity of care as medications may have been adjusted during hospital stay.  Notify ortho with any questions or concerns.

## 2018-08-17 NOTE — DISCHARGE NOTE ADULT - MEDICATION SUMMARY - MEDICATIONS TO TAKE
I will START or STAY ON the medications listed below when I get home from the hospital:    rosuvastatin 5 mg oral tablet  -- 1 tab(s) by mouth once a day (at bedtime)  -- Indication: For Home med     losartan-hydrochlorothiazide 50mg-12.5mg oral tablet  -- 1 tab(s) by mouth once a day  -- Indication: For Home med     Vitamin D3 2000 intl units oral capsule  -- 1 cap(s) by mouth once a day  -- Indication: For Home med

## 2018-08-17 NOTE — DISCHARGE NOTE ADULT - HOSPITAL COURSE
77 yo male with myelopathy, admitted to the orthopedic department for elective surgery.  Patient medically optimized and underwent a L2-5 lami with L3-4 disc on 8/16 by Dr Villalba with no annie-op complications. Post op, patient put in a brace and made weight bear as tolerated.  Patient doing well in PT, OT, pain controlled, labs and vitals stable, incision clean and dry.  As per Dr Villalba, patient stable for discharge.  Keep surgical site clean and dry.  No heavy lifting, bending or twisting until further notice.  Do NOT take any blood thinners until further notified by attending (no Advil, motrin, aleve, aspirin, etc).  As per Dr Villalba, patient to call office within 7-10 days to make follow up appointment and call PMD within 2 weeks for continuity of care as medications may have been adjusted during hospital stay.  Notify ortho with any questions or concerns. 77 yo maleadmitted to the orthopedic department for elective surgery.  Patient medically optimized and underwent a L2-5 lami with L3-4 disc on 8/16 by Dr Villalba with no annie-op complications. Post op, patient put in a brace and made weight bear as tolerated. Was found to have a post op ileus. Made NPO and seen by General Surgery for management and care. Once resolved, patient advanced to regular diet which was well tolerated prior to discharge. Patient doing well in PT, OT, pain controlled, labs and vitals stable, incision clean and dry.  As per Dr Villalba, patient stable for discharge.  Keep surgical site clean and dry.  No heavy lifting, bending or twisting until further notice.  Do NOT take any blood thinners until further notified by attending (no Advil, motrin, aleve, aspirin, etc).  As per Dr Villalba, patient to call office within 7-10 days to make follow up appointment and call PMD within 2 weeks for continuity of care as medications may have been adjusted during hospital stay.  Notify ortho with any questions or concerns.

## 2018-08-17 NOTE — PROGRESS NOTE ADULT - SUBJECTIVE AND OBJECTIVE BOX
ANESTHESIA POSTOP CHECK    78y Male POSTOP DAY 1 S/P L5-S1 laminectomy  POD1  no complaints    Vital Signs Last 24 Hrs  T(C): 36.7 (17 Aug 2018 13:39), Max: 36.7 (17 Aug 2018 13:39)  T(F): 98.1 (17 Aug 2018 13:39), Max: 98.1 (17 Aug 2018 13:39)  HR: 67 (17 Aug 2018 14:45) (65 - 85)  BP: 116/50 (17 Aug 2018 14:45) (82/40 - 168/86)  BP(mean): 96 (16 Aug 2018 21:15) (78 - 106)  RR: 18 (17 Aug 2018 13:39) (12 - 25)  SpO2: 98% (17 Aug 2018 13:39) (95% - 100%)  I&O's Summary    16 Aug 2018 07:01  -  17 Aug 2018 07:00  --------------------------------------------------------  IN: 960 mL / OUT: 1469 mL / NET: -509 mL    17 Aug 2018 07:01  -  17 Aug 2018 15:42  --------------------------------------------------------  IN: 0 mL / OUT: 1050 mL / NET: -1050 mL        [ X] NO APPARENT ANESTHESIA COMPLICATIONS      Comments:

## 2018-08-17 NOTE — DISCHARGE NOTE ADULT - NS AS DC FOLLOWUP STROKE INST
carenotes on laminectomy, discectomy, d/c medications and ortho side effect pamphlet carenotes on laminectomy, discectomy, d/c medications and ortho side effect pamphlet/Smoking Cessation

## 2018-08-17 NOTE — PHYSICAL THERAPY INITIAL EVALUATION ADULT - ADDITIONAL COMMENTS
Pt. reports owning DME of straight cane.     Pt. was left supine in bed post PT Evaluation, NAD, call guerra within reach. RN Kellie made aware of pt. status and participation in PT.

## 2018-08-17 NOTE — CONSULT NOTE ADULT - SUBJECTIVE AND OBJECTIVE BOX
Patient is a 78y old  Male who presents with a chief complaint of Spinal stenosis (17 Aug 2018 11:25)      HPI:  79 y/o male w/ Hx [place], Obesity, HTN, hyperlipidemia,  who presents with progressively worsening symptomatolgy of lumbar disc disease. Xrays and MR confirm pathology. Scheduled for L2-5 posterior lumbar laminectomy on 8-16-18 (01 Aug 2018 10:17)      History limited due to: [ ] Dementia  [ ] Delirium  [ ] Condition    Pain Symptoms if applicable:             	                         none	   mild         moderate         severe  Pain:	                            0	    1-3	     4-6	         7-10  Location:	  Modifying factors:	  Associated symptoms:	    Function: [ ] Independent  [ ] Assistance  [ ] Total care  [ ] Non-ambulatory    Allergies    sulfa drugs (Other)    Intolerances        HOME MEDICATIONS: [x ] Reviewed  omeprazole 40 mg oral delayed release capsule: 1 cap(s) orally once a day  rosuvastatin 5 mg oral tablet: 1 tab(s) orally once a day (at bedtime)  losartan-hydrochlorothiazide 50mg-12.5mg oral tablet: 1 tab(s) orally once a day  magnesium: 500mg  Vitamin D3 2000 intl units oral capsule: 1 cap(s) orally once a day  Aleve 220 mg oral tablet: 1 tab(s) orally every 8 hours. last dose  Aspirin Low Dose 81 mg oral delayed release tablet: ismael aspirin 1 tab(s) orally once a day  tumeric: 1 tab(s) orally once a day    MEDICATIONS  (STANDING):  atorvastatin 20 milliGRAM(s) Oral at bedtime  cholecalciferol 2000 Unit(s) Oral daily  docusate sodium 100 milliGRAM(s) Oral three times a day  hydrochlorothiazide 12.5 milliGRAM(s) Oral daily  lactated ringers. 1000 milliLiter(s) (125 mL/Hr) IV Continuous <Continuous>  losartan 50 milliGRAM(s) Oral daily  pantoprazole    Tablet 40 milliGRAM(s) Oral before breakfast  senna 2 Tablet(s) Oral at bedtime    MEDICATIONS  (PRN):  acetaminophen   Tablet 650 milliGRAM(s) Oral every 6 hours PRN For Temp greater than 38 C (100.4 F)  acetaminophen   Tablet. 650 milliGRAM(s) Oral every 6 hours PRN Mild Pain (1 - 3)  magnesium hydroxide Suspension 30 milliLiter(s) Oral every 12 hours PRN Constipation  oxyCODONE    IR 5 milliGRAM(s) Oral every 4 hours PRN Moderate Pain (4 - 6)  oxyCODONE    IR 10 milliGRAM(s) Oral every 4 hours PRN Severe Pain (7 - 10)      PAST MEDICAL & SURGICAL HISTORY:  Cataracts, bilateral  Hard of hearing: b/l hearing aids  Snoring: MARCOS precautions -- responds affirmatively to STOP BANG questionnaire  Obesity  GERD (gastroesophageal reflux disease)  Disc disease, degenerative, lumbar or lumbosacral: 2018  Hypercholesterolemia  Hypertension  Cataracts, both eyes: have lenses  Mass: excision of laryngeal mass  [x ] Reviewed     SOCIAL HISTORY:  Residence: [ ] [facility]  [ ] SNF  [x ] Community  [x ] Substance abuse: none  [ x] Tobacco: quit 1980  [x ] Alcohol use: wine 2-3 x/week    FAMILY HISTORY:  Family history of Alzheimer's disease (Mother)  Family history of myocardial infarction (Father)      REVIEW OF SYSTEMS:    CONSTITUTIONAL: No fever, weight loss, or fatigue  EYES: No eye pain, visual disturbances, or discharge  ENMT:  No difficulty hearing, tinnitus, vertigo; No sinus or throat pain  NECK: No pain or stiffness  BREASTS: No pain, masses, or nipple discharge  RESPIRATORY: No cough, wheezing, chills or hemoptysis; No shortness of breath  CARDIOVASCULAR: No chest pain, palpitations, dizziness, or leg swelling  GASTROINTESTINAL: No abdominal or epigastric pain. No nausea, vomiting, or hematemesis; No diarrhea or constipation. No melena or hematochezia.  GENITOURINARY: No dysuria, frequency, hematuria, or incontinence  NEUROLOGICAL: No headaches, memory loss, loss of strength, numbness, or tremors  SKIN: No itching, burning, rashes, or lesions   LYMPH NODES: No enlarged glands  ENDOCRINE: No heat or cold intolerance; No hair loss  MUSCULOSKELETAL: No muscle or back pain  PSYCHIATRIC: No depression, anxiety, mood swings, or difficulty sleeping  HEME/LYMPH: No easy bruising, or bleeding gums  ALLERGY AND IMMUNOLOGIC: No hives or eczema    [ x ] All other ROS negative  [  ] Unable to obtain due to poor mental status    Vital Signs Last 24 Hrs  T(C): 36.7 (17 Aug 2018 13:39), Max: 36.7 (17 Aug 2018 13:39)  T(F): 98.1 (17 Aug 2018 13:39), Max: 98.1 (17 Aug 2018 13:39)  HR: 67 (17 Aug 2018 14:45) (65 - 85)  BP: 116/50 (17 Aug 2018 14:45) (82/40 - 168/86)  BP(mean): 96 (16 Aug 2018 21:15) (78 - 106)  RR: 18 (17 Aug 2018 13:39) (12 - 25)  SpO2: 98% (17 Aug 2018 13:39) (95% - 100%)    PHYSICAL EXAM:    GENERAL: NAD, well-groomed, well-developed  HEAD:  Atraumatic, Normocephalic  EYES: EOMI, PERRLA, conjunctiva and sclera clear  ENMT: Moist mucous membranes  NECK: Supple, No JVD  RESPIRATORY: Clear to auscultation bilaterally; No rales, rhonchi, wheezing, or rubs  CARDIOVASCULAR: Regular rate and rhythm; No murmurs, rubs, or gallops  GASTROINTESTINAL: Soft, Nontender, Nondistended; Bowel sounds present  GENITOURINARY: Not examined  EXTREMITIES:  2+ Peripheral Pulses, No clubbing, cyanosis, or edema  NERVOUS SYSTEM:  Alert & Oriented X3; Moving all 4 extremities; No gross sensory deficits  HEME/LYMPH: No lymphadenopathy noted  SKIN: No rashes or lesions; Incisions C/D/I    LABS:                        10.9   12.68 )-----------( 169      ( 17 Aug 2018 06:21 )             33.6     08-17    139  |  101  |  18  ----------------------------<  145<H>  4.6   |  27  |  1.09    Ca    9.4      17 Aug 2018 06:21          CAPILLARY BLOOD GLUCOSE          RADIOLOGY & ADDITIONAL STUDIES:    EKG:   Personally Reviewed:  [ ] YES     Imaging: < from: Xray Lumbar Spine AP + Lateral (08.16.18 @ 22:43) >  IMPRESSION:  Distal tips of 2 surgical clamps project over the L3 and L4 spinous   processes. Tangled radiodense string-like material cephalad to the clamps   likely corresponds to crumpled gauze in the operative field.    Multilevel degenerative disease also apparent.    Correlate with preoperative workup and intraoperative findings.       < end of copied text >    Personally Reviewed:  [x ] YES               Consultant(s) notes reviewed:    Care Discussed with Consultant(s)/Other Providers: Ortho Patient is a 78y old  Male who presents with a chief complaint of Spinal stenosis (17 Aug 2018 11:25)      HPI:  79 y/o male w/ Hx [place], Obesity, HTN, hyperlipidemia,  who has had worsening Left foot drop x 2 years w/o back pain found to have lumbar disc diseas confirmed by Xrays and MR now s/p L2-5 laminectomy, L3-4 Discectomy on 8/16/18.  Patient now says left foot drop improving post op. He developed lightheadedness on standing with physical therapy today and SBP dropped to 70s. He denies cp, LOC, HA, cp, Palpitations, SOB, abdominal pain, N/V/D.         Pain Symptoms if applicable:             	                         none	   mild         moderate         severe  Pain: 0	                            0	    1-3	     4-6	         7-10  Location: NA	  Modifying factors: NA	  Associated symptoms:	    Function: [ x] Independent  [ ] Assistance  [ ] Total care  [ ] Non-ambulatory    Allergies    sulfa drugs (Other)    Intolerances        HOME MEDICATIONS: [x ] Reviewed  omeprazole 40 mg oral delayed release capsule: 1 cap(s) orally once a day  rosuvastatin 5 mg oral tablet: 1 tab(s) orally once a day (at bedtime)  losartan-hydrochlorothiazide 50mg-12.5mg oral tablet: 1 tab(s) orally once a day  magnesium: 500mg  Vitamin D3 2000 intl units oral capsule: 1 cap(s) orally once a day  Aleve 220 mg oral tablet: 1 tab(s) orally every 8 hours. last dose  Aspirin Low Dose 81 mg oral delayed release tablet: ismael aspirin 1 tab(s) orally once a day  tumeric: 1 tab(s) orally once a day    MEDICATIONS  (STANDING):  atorvastatin 20 milliGRAM(s) Oral at bedtime  cholecalciferol 2000 Unit(s) Oral daily  docusate sodium 100 milliGRAM(s) Oral three times a day  hydrochlorothiazide 12.5 milliGRAM(s) Oral daily  lactated ringers. 1000 milliLiter(s) (125 mL/Hr) IV Continuous <Continuous>  losartan 50 milliGRAM(s) Oral daily  pantoprazole    Tablet 40 milliGRAM(s) Oral before breakfast  senna 2 Tablet(s) Oral at bedtime    MEDICATIONS  (PRN):  acetaminophen   Tablet 650 milliGRAM(s) Oral every 6 hours PRN For Temp greater than 38 C (100.4 F)  acetaminophen   Tablet. 650 milliGRAM(s) Oral every 6 hours PRN Mild Pain (1 - 3)  magnesium hydroxide Suspension 30 milliLiter(s) Oral every 12 hours PRN Constipation  oxyCODONE    IR 5 milliGRAM(s) Oral every 4 hours PRN Moderate Pain (4 - 6)  oxyCODONE    IR 10 milliGRAM(s) Oral every 4 hours PRN Severe Pain (7 - 10)      PAST MEDICAL & SURGICAL HISTORY:  Cataracts, bilateral  Hard of hearing: b/l hearing aids  Snoring: MARCOS precautions -- responds affirmatively to STOP BANG questionnaire  Obesity  GERD (gastroesophageal reflux disease)  Disc disease, degenerative, lumbar or lumbosacral: 2018  Hypercholesterolemia  Hypertension  Cataracts, both eyes: have lenses  Mass: excision of laryngeal mass  [x ] Reviewed     SOCIAL HISTORY:  Residence: [ ] [facility]  [ ] [facility]  [x ] Community  [x ] Substance abuse: none  [ x] Tobacco: quit 1980  [x ] Alcohol use: wine 2-3 x/week    FAMILY HISTORY:  Family history of Alzheimer's disease (Mother)  Family history of myocardial infarction (Father)      REVIEW OF SYSTEMS:    CONSTITUTIONAL: No fever, weight loss, or fatigue  EYES: No eye pain, visual disturbances, or discharge  ENMT:  No difficulty hearing, tinnitus, vertigo; No sinus or throat pain  NECK: No pain or stiffness  BREASTS: No pain, masses, or nipple discharge  RESPIRATORY: No cough, wheezing, chills or hemoptysis; No shortness of breath  CARDIOVASCULAR: No chest pain, palpitations, dizziness, or leg swelling  GASTROINTESTINAL: No abdominal or epigastric pain. No nausea, vomiting, or hematemesis; No diarrhea or constipation. No melena or hematochezia.  GENITOURINARY: No dysuria, frequency, hematuria, or incontinence  NEUROLOGICAL: No headaches, memory loss, loss of strength, numbness, or tremors  SKIN: No itching, burning, rashes, or lesions   LYMPH NODES: No enlarged glands  ENDOCRINE: No heat or cold intolerance; No hair loss  MUSCULOSKELETAL: No muscle or back pain  PSYCHIATRIC: No depression, anxiety, mood swings, or difficulty sleeping  HEME/LYMPH: No easy bruising, or bleeding gums  ALLERGY AND IMMUNOLOGIC: No hives or eczema    [ x ] All other ROS negative  [  ] Unable to obtain due to poor mental status    Vital Signs Last 24 Hrs  T(C): 36.7 (17 Aug 2018 13:39), Max: 36.7 (17 Aug 2018 13:39)  T(F): 98.1 (17 Aug 2018 13:39), Max: 98.1 (17 Aug 2018 13:39)  HR: 67 (17 Aug 2018 14:45) (65 - 85)  BP: 116/50 (17 Aug 2018 14:45) (82/40 - 168/86)  BP(mean): 96 (16 Aug 2018 21:15) (78 - 106)  RR: 18 (17 Aug 2018 13:39) (12 - 25)  SpO2: 98% (17 Aug 2018 13:39) (95% - 100%)    PHYSICAL EXAM:    GENERAL: NAD, well-groomed, well-developed  HEAD:  Atraumatic, Normocephalic  EYES: EOMI, PERRLA, conjunctiva and sclera clear  ENMT: Moist mucous membranes  NECK: Supple, No JVD  RESPIRATORY: Clear to auscultation bilaterally; No rales, rhonchi, wheezing, or rubs  CARDIOVASCULAR: Regular rate and rhythm; No murmurs, rubs, or gallops  GASTROINTESTINAL: Soft, Nontender, Nondistended; Bowel sounds present  GENITOURINARY: Not examined  EXTREMITIES:  2+ Peripheral Pulses, No clubbing, cyanosis, or edema  NERVOUS SYSTEM:  Alert & Oriented X3; + Left foot dorsiflexion weakness. Moving all 4 extremities; No gross sensory deficits  HEME/LYMPH: No lymphadenopathy noted  SKIN: No rashes or lesions; Incisions C/D/I    LABS:                        10.9   12.68 )-----------( 169      ( 17 Aug 2018 06:21 )             33.6     08-17    139  |  101  |  18  ----------------------------<  145<H>  4.6   |  27  |  1.09    Ca    9.4      17 Aug 2018 06:21          CAPILLARY BLOOD GLUCOSE          RADIOLOGY & ADDITIONAL STUDIES:    EKG:   Personally Reviewed:  [ ] YES     Imaging: < from: Xray Lumbar Spine AP + Lateral (08.16.18 @ 22:43) >  IMPRESSION:  Distal tips of 2 surgical clamps project over the L3 and L4 spinous   processes. Tangled radiodense string-like material cephalad to the clamps   likely corresponds to crumpled gauze in the operative field.    Multilevel degenerative disease also apparent.    Correlate with preoperative workup and intraoperative findings.       < end of copied text >    Personally Reviewed:  [x ] YES               Consultant(s) notes reviewed:    Care Discussed with Consultant(s)/Other Providers: Ortho Patient is a 78y old  Male who presents with a chief complaint of Spinal stenosis (17 Aug 2018 11:25)      HPI:  79 y/o male w/ Hx [place], Obesity, HTN, hyperlipidemia, who has had worsening Left foot drop x 2 years w/o back pain found to have lumbar disc disease confirmed by Xrays and MR now s/p L2-5 laminectomy, L3-4 Discectomy on 8/16/18.  Patient now says left foot drop improving post op. He developed lightheadedness on standing with physical therapy today and SBP dropped to 70s. He denies cp, LOC, HA, cp, Palpitations, SOB, abdominal pain, N/V/D.         Pain Symptoms if applicable:             	                         none	   mild         moderate         severe  Pain: 0	                            0	    1-3	     4-6	         7-10  Location: NA	  Modifying factors: NA	  Associated symptoms:	    Function: [ x] Independent  [ ] Assistance  [ ] Total care  [ ] Non-ambulatory    Allergies    sulfa drugs (Other)    Intolerances        HOME MEDICATIONS: [x ] Reviewed  omeprazole 40 mg oral delayed release capsule: 1 cap(s) orally once a day  rosuvastatin 5 mg oral tablet: 1 tab(s) orally once a day (at bedtime)  losartan-hydrochlorothiazide 50mg-12.5mg oral tablet: 1 tab(s) orally once a day  magnesium: 500mg  Vitamin D3 2000 intl units oral capsule: 1 cap(s) orally once a day  Aleve 220 mg oral tablet: 1 tab(s) orally every 8 hours. last dose  Aspirin Low Dose 81 mg oral delayed release tablet: ismael aspirin 1 tab(s) orally once a day  tumeric: 1 tab(s) orally once a day    MEDICATIONS  (STANDING):  atorvastatin 20 milliGRAM(s) Oral at bedtime  cholecalciferol 2000 Unit(s) Oral daily  docusate sodium 100 milliGRAM(s) Oral three times a day  hydrochlorothiazide 12.5 milliGRAM(s) Oral daily  lactated ringers. 1000 milliLiter(s) (125 mL/Hr) IV Continuous <Continuous>  losartan 50 milliGRAM(s) Oral daily  pantoprazole    Tablet 40 milliGRAM(s) Oral before breakfast  senna 2 Tablet(s) Oral at bedtime    MEDICATIONS  (PRN):  acetaminophen   Tablet 650 milliGRAM(s) Oral every 6 hours PRN For Temp greater than 38 C (100.4 F)  acetaminophen   Tablet. 650 milliGRAM(s) Oral every 6 hours PRN Mild Pain (1 - 3)  magnesium hydroxide Suspension 30 milliLiter(s) Oral every 12 hours PRN Constipation  oxyCODONE    IR 5 milliGRAM(s) Oral every 4 hours PRN Moderate Pain (4 - 6)  oxyCODONE    IR 10 milliGRAM(s) Oral every 4 hours PRN Severe Pain (7 - 10)      PAST MEDICAL & SURGICAL HISTORY:  Cataracts, bilateral  Hard of hearing: b/l hearing aids  Snoring: MARCOS precautions -- responds affirmatively to STOP BANG questionnaire  Obesity  GERD (gastroesophageal reflux disease)  Disc disease, degenerative, lumbar or lumbosacral: 2018  Hypercholesterolemia  Hypertension  Cataracts, both eyes: have lenses  Mass: excision of laryngeal mass  [x ] Reviewed     SOCIAL HISTORY:  Residence: [ ] [facility]  [ ] SNF  [x ] Community  [x ] Substance abuse: none  [ x] Tobacco: quit 1980  [x ] Alcohol use: wine 2-3 x/week    FAMILY HISTORY:  Family history of Alzheimer's disease (Mother)  Family history of myocardial infarction (Father)      REVIEW OF SYSTEMS:    CONSTITUTIONAL: No fever, weight loss, or fatigue  EYES: No eye pain, visual disturbances, or discharge  ENMT:  No difficulty hearing, tinnitus, vertigo; No sinus or throat pain  NECK: No pain or stiffness  BREASTS: No pain, masses, or nipple discharge  RESPIRATORY: No cough, wheezing, chills or hemoptysis; No shortness of breath  CARDIOVASCULAR: No chest pain, palpitations, dizziness, or leg swelling  GASTROINTESTINAL: No abdominal or epigastric pain. No nausea, vomiting, or hematemesis; No diarrhea or constipation. No melena or hematochezia.  GENITOURINARY: No dysuria, frequency, hematuria, or incontinence  NEUROLOGICAL: No headaches, memory loss, loss of strength, numbness, or tremors  SKIN: No itching, burning, rashes, or lesions   LYMPH NODES: No enlarged glands  ENDOCRINE: No heat or cold intolerance; No hair loss  MUSCULOSKELETAL: No muscle or back pain  PSYCHIATRIC: No depression, anxiety, mood swings, or difficulty sleeping  HEME/LYMPH: No easy bruising, or bleeding gums  ALLERGY AND IMMUNOLOGIC: No hives or eczema    [ x ] All other ROS negative  [  ] Unable to obtain due to poor mental status    Vital Signs Last 24 Hrs  T(C): 36.7 (17 Aug 2018 13:39), Max: 36.7 (17 Aug 2018 13:39)  T(F): 98.1 (17 Aug 2018 13:39), Max: 98.1 (17 Aug 2018 13:39)  HR: 67 (17 Aug 2018 14:45) (65 - 85)  BP: 116/50 (17 Aug 2018 14:45) (82/40 - 168/86)  BP(mean): 96 (16 Aug 2018 21:15) (78 - 106)  RR: 18 (17 Aug 2018 13:39) (12 - 25)  SpO2: 98% (17 Aug 2018 13:39) (95% - 100%)    PHYSICAL EXAM:    GENERAL: NAD, well-groomed, well-developed  HEAD:  Atraumatic, Normocephalic  EYES: EOMI, PERRLA, conjunctiva and sclera clear  ENMT: Moist mucous membranes  NECK: Supple, No JVD  RESPIRATORY: Clear to auscultation bilaterally; No rales, rhonchi, wheezing, or rubs  CARDIOVASCULAR: Regular rate and rhythm; No murmurs, rubs, or gallops  GASTROINTESTINAL: Soft, Nontender, Nondistended; Bowel sounds present  GENITOURINARY: Not examined  EXTREMITIES:  2+ Peripheral Pulses, No clubbing, cyanosis, or edema  NERVOUS SYSTEM:  Alert & Oriented X3; + Left foot dorsiflexion weakness. Moving all 4 extremities; No gross sensory deficits  HEME/LYMPH: No lymphadenopathy noted  SKIN: No rashes or lesions; Incisions C/D/I    LABS:                        10.9   12.68 )-----------( 169      ( 17 Aug 2018 06:21 )             33.6     08-17    139  |  101  |  18  ----------------------------<  145<H>  4.6   |  27  |  1.09    Ca    9.4      17 Aug 2018 06:21          CAPILLARY BLOOD GLUCOSE          RADIOLOGY & ADDITIONAL STUDIES:    EKG:   Personally Reviewed:  [ ] YES     Imaging: < from: Xray Lumbar Spine AP + Lateral (08.16.18 @ 22:43) >  IMPRESSION:  Distal tips of 2 surgical clamps project over the L3 and L4 spinous   processes. Tangled radiodense string-like material cephalad to the clamps   likely corresponds to crumpled gauze in the operative field.    Multilevel degenerative disease also apparent.    Correlate with preoperative workup and intraoperative findings.       < end of copied text >    Personally Reviewed:  [x ] YES               Consultant(s) notes reviewed:    Care Discussed with Consultant(s)/Other Providers: Ortho

## 2018-08-17 NOTE — DISCHARGE NOTE ADULT - PLAN OF CARE
ambulation, pain control, ADLs 79 yo male with myelopathy, admitted to the orthopedic department for elective surgery.  Patient medically optimized and underwent a L2-5 lami with L3-4 disc on 8/16 by Dr Villalba with no annie-op complications. Post op, patient put in a brace and made weight bear as tolerated.  Patient doing well in PT, OT, pain controlled, labs and vitals stable, incision clean and dry.  As per Dr Villalba, patient stable for discharge.  Keep surgical site clean and dry.  No heavy lifting, bending or twisting until further notice.  Do NOT take any blood thinners until further notified by attending (no Advil, motrin, aleve, aspirin, etc).  As per Dr Villalba, patient to call office within 7-10 days to make follow up appointment and call PMD within 2 weeks for continuity of care as medications may have been adjusted during hospital stay.  Notify ortho with any questions or concerns. 79 yo maleadmitted to the orthopedic department for elective surgery.  Patient medically optimized and underwent a L2-5 lami with L3-4 disc on 8/16 by Dr Villalba with no annie-op complications. Post op, patient put in a brace and made weight bear as tolerated. Was found to have a post op ileus. Made NPO and seen by General Surgery for management and care. Once resolved, patient advanced to regular diet which was well tolerated prior to discharge. Patient doing well in PT, OT, pain controlled, labs and vitals stable, incision clean and dry.  As per Dr Villalba, patient stable for discharge.  Keep surgical site clean and dry.  No heavy lifting, bending or twisting until further notice.  Do NOT take any blood thinners until further notified by attending (no Advil, motrin, aleve, aspirin, etc).  As per Dr Villalba, patient to call office within 7-10 days to make follow up appointment and call PMD within 2 weeks for continuity of care as medications may have been adjusted during hospital stay.  Notify ortho with any questions or concerns.

## 2018-08-17 NOTE — OCCUPATIONAL THERAPY INITIAL EVALUATION ADULT - MD ORDER
Occupational Therapy to evaluate and treat. Occupational Therapy to evaluate and treat.  Out to bed to chair.

## 2018-08-17 NOTE — PHYSICAL THERAPY INITIAL EVALUATION ADULT - GENERAL OBSERVATIONS, REHAB EVAL
Consult received, chart reviewed. Patient received supine in bed, NAD, +gant, +hemovac. Patient agreed to Evaluation from Physical Therapist.

## 2018-08-17 NOTE — PHYSICAL THERAPY INITIAL EVALUATION ADULT - LEVEL OF INDEPENDENCE: GAIT, REHAB EVAL
To be assessed. Deferred secondary to pt. reporting feeling dizzy/lightheaded with standing. Pt. returned to supine in bed, BP 92/49.

## 2018-08-17 NOTE — PHYSICAL THERAPY INITIAL EVALUATION ADULT - CRITERIA FOR SKILLED THERAPEUTIC INTERVENTIONS
predicted duration of therapy intervention/impairments found/therapy frequency/anticipated discharge recommendation/risk reduction/prevention/rehab potential

## 2018-08-17 NOTE — CONSULT NOTE ADULT - PROBLEM SELECTOR RECOMMENDATION 9
s/p L2-5 posterior lumbar laminectomy on 8-16-18   management as per ortho  limit opioids due to orthostatic hypotension

## 2018-08-17 NOTE — PROGRESS NOTE ADULT - SUBJECTIVE AND OBJECTIVE BOX
Patient is doing well. Pain well controlled with pain medications.    Vital Signs Last 24 Hrs  T(C): 36.5 (17 Aug 2018 06:29), Max: 36.5 (16 Aug 2018 12:48)  T(F): 97.7 (17 Aug 2018 06:29), Max: 97.7 (16 Aug 2018 12:48)  HR: 75 (17 Aug 2018 06:29) (65 - 86)  BP: 111/43 (17 Aug 2018 06:29) (111/43 - 168/86)  BP(mean): 96 (16 Aug 2018 21:15) (78 - 106)  RR: 16 (17 Aug 2018 06:29) (12 - 25)  SpO2: 98% (17 Aug 2018 06:29) (95% - 100%)    Exam:  Gen: NAD  Motor: 5/5 EHL/FHL/TA/Gastrocnemius  Sensory: SILT DP/SP/S/S/T nerve distributions  Dressing: Clean, Dry, Intact                          11.2   10.49 )-----------( 160      ( 16 Aug 2018 20:25 )             34.5     08-16    140  |  105  |  20  ----------------------------<  139<H>  3.9   |  23  |  1.15    Ca    8.8      16 Aug 2018 20:25          A/P: 78 year old male s/p L2-5 laminectomy, L3-4 Discectomy  - Pain Control  - Regular Diet  - PT/OT, OOB  - WBAT  - Monitor HV

## 2018-08-18 LAB
BASOPHILS # BLD AUTO: 0.03 K/UL — SIGNIFICANT CHANGE UP (ref 0–0.2)
BASOPHILS NFR BLD AUTO: 0.2 % — SIGNIFICANT CHANGE UP (ref 0–2)
BUN SERPL-MCNC: 18 MG/DL — SIGNIFICANT CHANGE UP (ref 7–23)
CALCIUM SERPL-MCNC: 8.6 MG/DL — SIGNIFICANT CHANGE UP (ref 8.4–10.5)
CHLORIDE SERPL-SCNC: 100 MMOL/L — SIGNIFICANT CHANGE UP (ref 98–107)
CO2 SERPL-SCNC: 25 MMOL/L — SIGNIFICANT CHANGE UP (ref 22–31)
CREAT SERPL-MCNC: 1.12 MG/DL — SIGNIFICANT CHANGE UP (ref 0.5–1.3)
EOSINOPHIL # BLD AUTO: 0 K/UL — SIGNIFICANT CHANGE UP (ref 0–0.5)
EOSINOPHIL NFR BLD AUTO: 0 % — SIGNIFICANT CHANGE UP (ref 0–6)
GLUCOSE SERPL-MCNC: 128 MG/DL — HIGH (ref 70–99)
HCT VFR BLD CALC: 32 % — LOW (ref 39–50)
HGB BLD-MCNC: 10.4 G/DL — LOW (ref 13–17)
IMM GRANULOCYTES # BLD AUTO: 0.15 # — SIGNIFICANT CHANGE UP
IMM GRANULOCYTES NFR BLD AUTO: 0.9 % — SIGNIFICANT CHANGE UP (ref 0–1.5)
LYMPHOCYTES # BLD AUTO: 0.66 K/UL — LOW (ref 1–3.3)
LYMPHOCYTES # BLD AUTO: 3.8 % — LOW (ref 13–44)
MCHC RBC-ENTMCNC: 30.9 PG — SIGNIFICANT CHANGE UP (ref 27–34)
MCHC RBC-ENTMCNC: 32.5 % — SIGNIFICANT CHANGE UP (ref 32–36)
MCV RBC AUTO: 95 FL — SIGNIFICANT CHANGE UP (ref 80–100)
MONOCYTES # BLD AUTO: 1.82 K/UL — HIGH (ref 0–0.9)
MONOCYTES NFR BLD AUTO: 10.4 % — SIGNIFICANT CHANGE UP (ref 2–14)
NEUTROPHILS # BLD AUTO: 14.77 K/UL — HIGH (ref 1.8–7.4)
NEUTROPHILS NFR BLD AUTO: 84.7 % — HIGH (ref 43–77)
NRBC # FLD: 0 — SIGNIFICANT CHANGE UP
PLATELET # BLD AUTO: 154 K/UL — SIGNIFICANT CHANGE UP (ref 150–400)
PMV BLD: 11.3 FL — SIGNIFICANT CHANGE UP (ref 7–13)
POTASSIUM SERPL-MCNC: 3.7 MMOL/L — SIGNIFICANT CHANGE UP (ref 3.5–5.3)
POTASSIUM SERPL-SCNC: 3.7 MMOL/L — SIGNIFICANT CHANGE UP (ref 3.5–5.3)
RBC # BLD: 3.37 M/UL — LOW (ref 4.2–5.8)
RBC # FLD: 13.6 % — SIGNIFICANT CHANGE UP (ref 10.3–14.5)
SODIUM SERPL-SCNC: 137 MMOL/L — SIGNIFICANT CHANGE UP (ref 135–145)
WBC # BLD: 17.43 K/UL — HIGH (ref 3.8–10.5)
WBC # FLD AUTO: 17.43 K/UL — HIGH (ref 3.8–10.5)

## 2018-08-18 PROCEDURE — 99233 SBSQ HOSP IP/OBS HIGH 50: CPT | Mod: GC

## 2018-08-18 PROCEDURE — 71045 X-RAY EXAM CHEST 1 VIEW: CPT | Mod: 26

## 2018-08-18 RX ORDER — HYDROMORPHONE HYDROCHLORIDE 2 MG/ML
1 INJECTION INTRAMUSCULAR; INTRAVENOUS; SUBCUTANEOUS ONCE
Qty: 0 | Refills: 0 | Status: DISCONTINUED | OUTPATIENT
Start: 2018-08-18 | End: 2018-08-18

## 2018-08-18 RX ORDER — DIAZEPAM 5 MG
5 TABLET ORAL EVERY 8 HOURS
Qty: 0 | Refills: 0 | Status: DISCONTINUED | OUTPATIENT
Start: 2018-08-18 | End: 2018-08-18

## 2018-08-18 RX ORDER — HYDRALAZINE HCL 50 MG
10 TABLET ORAL ONCE
Qty: 0 | Refills: 0 | Status: COMPLETED | OUTPATIENT
Start: 2018-08-18 | End: 2018-08-18

## 2018-08-18 RX ORDER — DEXAMETHASONE 0.5 MG/5ML
10 ELIXIR ORAL EVERY 6 HOURS
Qty: 0 | Refills: 0 | Status: COMPLETED | OUTPATIENT
Start: 2018-08-18 | End: 2018-08-19

## 2018-08-18 RX ORDER — DIAZEPAM 5 MG
2 TABLET ORAL EVERY 6 HOURS
Qty: 0 | Refills: 0 | Status: DISCONTINUED | OUTPATIENT
Start: 2018-08-18 | End: 2018-08-18

## 2018-08-18 RX ADMIN — Medication 100 MILLIGRAM(S): at 21:38

## 2018-08-18 RX ADMIN — OXYCODONE HYDROCHLORIDE 10 MILLIGRAM(S): 5 TABLET ORAL at 11:00

## 2018-08-18 RX ADMIN — OXYCODONE HYDROCHLORIDE 10 MILLIGRAM(S): 5 TABLET ORAL at 14:17

## 2018-08-18 RX ADMIN — OXYCODONE HYDROCHLORIDE 10 MILLIGRAM(S): 5 TABLET ORAL at 05:00

## 2018-08-18 RX ADMIN — SENNA PLUS 2 TABLET(S): 8.6 TABLET ORAL at 21:38

## 2018-08-18 RX ADMIN — Medication 2000 UNIT(S): at 12:29

## 2018-08-18 RX ADMIN — OXYCODONE HYDROCHLORIDE 10 MILLIGRAM(S): 5 TABLET ORAL at 01:04

## 2018-08-18 RX ADMIN — Medication 102 MILLIGRAM(S): at 18:18

## 2018-08-18 RX ADMIN — Medication 100 MILLIGRAM(S): at 14:17

## 2018-08-18 RX ADMIN — OXYCODONE HYDROCHLORIDE 10 MILLIGRAM(S): 5 TABLET ORAL at 00:04

## 2018-08-18 RX ADMIN — Medication 5 MILLIGRAM(S): at 12:29

## 2018-08-18 RX ADMIN — ATORVASTATIN CALCIUM 20 MILLIGRAM(S): 80 TABLET, FILM COATED ORAL at 21:38

## 2018-08-18 RX ADMIN — OXYCODONE HYDROCHLORIDE 10 MILLIGRAM(S): 5 TABLET ORAL at 10:09

## 2018-08-18 RX ADMIN — HYDROMORPHONE HYDROCHLORIDE 1 MILLIGRAM(S): 2 INJECTION INTRAMUSCULAR; INTRAVENOUS; SUBCUTANEOUS at 16:45

## 2018-08-18 RX ADMIN — OXYCODONE HYDROCHLORIDE 10 MILLIGRAM(S): 5 TABLET ORAL at 22:08

## 2018-08-18 RX ADMIN — LOSARTAN POTASSIUM 50 MILLIGRAM(S): 100 TABLET, FILM COATED ORAL at 05:01

## 2018-08-18 RX ADMIN — HYDROMORPHONE HYDROCHLORIDE 1 MILLIGRAM(S): 2 INJECTION INTRAMUSCULAR; INTRAVENOUS; SUBCUTANEOUS at 16:15

## 2018-08-18 RX ADMIN — OXYCODONE HYDROCHLORIDE 10 MILLIGRAM(S): 5 TABLET ORAL at 21:38

## 2018-08-18 RX ADMIN — PANTOPRAZOLE SODIUM 40 MILLIGRAM(S): 20 TABLET, DELAYED RELEASE ORAL at 05:01

## 2018-08-18 RX ADMIN — Medication 102 MILLIGRAM(S): at 12:58

## 2018-08-18 RX ADMIN — Medication 100 MILLIGRAM(S): at 05:01

## 2018-08-18 RX ADMIN — Medication 650 MILLIGRAM(S): at 03:33

## 2018-08-18 RX ADMIN — OXYCODONE HYDROCHLORIDE 10 MILLIGRAM(S): 5 TABLET ORAL at 15:10

## 2018-08-18 RX ADMIN — Medication 10 MILLIGRAM(S): at 17:43

## 2018-08-18 RX ADMIN — Medication 12.5 MILLIGRAM(S): at 05:01

## 2018-08-18 RX ADMIN — OXYCODONE HYDROCHLORIDE 10 MILLIGRAM(S): 5 TABLET ORAL at 06:00

## 2018-08-18 NOTE — PROGRESS NOTE ADULT - SUBJECTIVE AND OBJECTIVE BOX
Patient is doing well. Pain well controlled with pain medications.  Febrile overnight to 101      Exam:  Gen: NAD  Motor: 5/5 EHL/FHL/TA/Gastrocnemius  Sensory: SILT DP/SP/S/S/T nerve distributions  Dressing: Clean, Dry, Intact, HV 5/60, removed on rounds    T(C): 37 (08-18-18 @ 04:59), Max: 38.5 (08-18-18 @ 03:21)  HR: 90 (08-18-18 @ 04:59) (65 - 93)  BP: 156/60 (08-18-18 @ 04:59) (82/40 - 156/60)  RR: 18 (08-18-18 @ 04:59) (16 - 18)  SpO2: 96% (08-18-18 @ 04:59) (94% - 100%)  Wt(kg): --                          10.3   16.59 )-----------( 173      ( 17 Aug 2018 14:45 )             31.7     08-17    139  |  101  |  18  ----------------------------<  145<H>  4.6   |  27  |  1.09    Ca    9.4      17 Aug 2018 06:21        A/P: 78 year old male s/p L2-5 laminectomy, L3-4 Discectomy  - Pain Control  - Regular Diet  - PT/OT, OOB  - WBAT  - dispo planning

## 2018-08-18 NOTE — PROGRESS NOTE ADULT - PROBLEM SELECTOR PLAN 4
BPs ranging 114-171 systolic. BP meds initially held due to orthostatic hypotension.  -c/w antihypertensives with hold parameters

## 2018-08-18 NOTE — PROGRESS NOTE ADULT - ASSESSMENT
77 y/o male w/ Hx [place], Obesity, HTN, hyperlipidemia,  who presents with progressively worsening symptomatolgy of lumbar disc disease. Xrays and MR confirm pathology. S/p L2-5 posterior lumbar laminectomy on 8-16-18.

## 2018-08-18 NOTE — PROGRESS NOTE ADULT - SUBJECTIVE AND OBJECTIVE BOX
Patient is a 78y old  Male who presents with a chief complaint of Spinal stenosis (17 Aug 2018 11:25)      SUBJECTIVE / OVERNIGHT EVENTS: Last BM on Thursday. Patient reports having continued pain more with movement. He has occasional cough but denies sore throat, rhinorrhea , SOB, or worsening back pain.    Review of Systems:     MEDICATIONS  (STANDING):  atorvastatin 20 milliGRAM(s) Oral at bedtime  cholecalciferol 2000 Unit(s) Oral daily  dexamethasone  IVPB 10 milliGRAM(s) IV Intermittent every 6 hours  docusate sodium 100 milliGRAM(s) Oral three times a day  hydrochlorothiazide 12.5 milliGRAM(s) Oral daily  lactated ringers. 1000 milliLiter(s) (125 mL/Hr) IV Continuous <Continuous>  losartan 50 milliGRAM(s) Oral daily  pantoprazole    Tablet 40 milliGRAM(s) Oral before breakfast  senna 2 Tablet(s) Oral at bedtime    MEDICATIONS  (PRN):  acetaminophen   Tablet 650 milliGRAM(s) Oral every 6 hours PRN For Temp greater than 38 C (100.4 F)  acetaminophen   Tablet. 650 milliGRAM(s) Oral every 6 hours PRN Mild Pain (1 - 3)  diazepam    Tablet 5 milliGRAM(s) Oral every 8 hours PRN Back Spasms  magnesium hydroxide Suspension 30 milliLiter(s) Oral every 12 hours PRN Constipation  oxyCODONE    IR 5 milliGRAM(s) Oral every 4 hours PRN Moderate Pain (4 - 6)  oxyCODONE    IR 10 milliGRAM(s) Oral every 4 hours PRN Severe Pain (7 - 10)      PHYSICAL EXAM:  Vital Signs Last 24 Hrs  T(C): 36.9 (18 Aug 2018 14:12), Max: 38.5 (18 Aug 2018 03:21)  T(F): 98.5 (18 Aug 2018 14:12), Max: 101.3 (18 Aug 2018 03:21)  HR: 81 (18 Aug 2018 14:12) (71 - 93)  BP: 171/68 (18 Aug 2018 14:12) (114/57 - 171/68)  BP(mean): --  RR: 18 (18 Aug 2018 14:12) (16 - 18)  SpO2: 96% (18 Aug 2018 14:12) (94% - 100%)  I&O's Summary    17 Aug 2018 07:01  -  18 Aug 2018 07:00  --------------------------------------------------------  IN: 0 mL / OUT: 2785 mL / NET: -2785 mL    18 Aug 2018 07:01  -  18 Aug 2018 15:42  --------------------------------------------------------  IN: 0 mL / OUT: 1600 mL / NET: -1600 mL      GENERAL: NAD, well-groomed, pleasant   HEAD:  Atraumatic, Normocephalic  EYES: EOMI, PERRLA, conjunctiva and sclera clear  ENMT: Moist mucous membranes  NECK: Supple, No JVD  RESPIRATORY: Clear to auscultation bilaterally;   CARDIOVASCULAR: Regular rate and rhythm; No murmurs, rubs, or gallops  GASTROINTESTINAL: Soft, Nontender, Nondistended; Bowel sounds present  GENITOURINARY: Not examined  EXTREMITIES:  2+ Peripheral Pulses, No clubbing, cyanosis, or edema  NERVOUS SYSTEM:  Alert & Oriented X3; Moving all 4 extremities;   HEME/LYMPH: No lymphadenopathy noted  SKIN: No rashes or lesions; Incisions C/D/I    LABS:  CAPILLARY BLOOD GLUCOSE                              10.4   17.43 )-----------( 154      ( 18 Aug 2018 06:30 )             32.0     08-18    137  |  100  |  18  ----------------------------<  128<H>  3.7   |  25  |  1.12    Ca    8.6      18 Aug 2018 06:30                RADIOLOGY & ADDITIONAL TESTS:    Imaging Personally Reviewed:    Consultant(s) Notes Reviewed:      Care Discussed with Consultants/Other Providers:

## 2018-08-18 NOTE — PROGRESS NOTE ADULT - PROBLEM SELECTOR PLAN 3
Likely due to post op volume depletion  repeat orthostatics   Monitoring H/H for acute blood loss anemia. However, has been overall stable

## 2018-08-18 NOTE — PROVIDER CONTACT NOTE (OTHER) - ACTION/TREATMENT ORDERED:
Per MD reassess vitals in 1 hour
1L fluid bolus ordered
Notified MD , will order Hydrolazine, will continue to monitor
no new interventions at this time

## 2018-08-18 NOTE — PROVIDER CONTACT NOTE (OTHER) - SITUATION
Patient woke up, denies pain, repeated attempts to get out of bed. A&Ox3, but patient needs reorientation at times. Oral temp 101.3, HR 93, /71, RR 18, 94% on RA.

## 2018-08-18 NOTE — PROGRESS NOTE ADULT - PROBLEM SELECTOR PLAN 1
Patient spiked fever overnight to 101.3. Only recent symptom is reported cough. However, pt denies SOB/ pleuritic CP. Also w/ increased leukocytosis but this can be seen post operatively   -would obtain CXR   -encourage Incentive Spirometer for possible post op atelectasis   -trend CBC   -If continues to spike would obtain bld cx and u/a and urine cx   -would recommend dc gant and TOV when feasible

## 2018-08-19 LAB
BASOPHILS # BLD AUTO: 0.02 K/UL — SIGNIFICANT CHANGE UP (ref 0–0.2)
BASOPHILS NFR BLD AUTO: 0.1 % — SIGNIFICANT CHANGE UP (ref 0–2)
BASOPHILS NFR SPEC: 0 % — SIGNIFICANT CHANGE UP (ref 0–2)
BLASTS # FLD: 0 % — SIGNIFICANT CHANGE UP (ref 0–0)
BUN SERPL-MCNC: 15 MG/DL — SIGNIFICANT CHANGE UP (ref 7–23)
CALCIUM SERPL-MCNC: 9.4 MG/DL — SIGNIFICANT CHANGE UP (ref 8.4–10.5)
CHLORIDE SERPL-SCNC: 96 MMOL/L — LOW (ref 98–107)
CO2 SERPL-SCNC: 24 MMOL/L — SIGNIFICANT CHANGE UP (ref 22–31)
CREAT SERPL-MCNC: 0.97 MG/DL — SIGNIFICANT CHANGE UP (ref 0.5–1.3)
EOSINOPHIL # BLD AUTO: 0 K/UL — SIGNIFICANT CHANGE UP (ref 0–0.5)
EOSINOPHIL NFR BLD AUTO: 0 % — SIGNIFICANT CHANGE UP (ref 0–6)
EOSINOPHIL NFR FLD: 0 % — SIGNIFICANT CHANGE UP (ref 0–6)
GIANT PLATELETS BLD QL SMEAR: PRESENT — SIGNIFICANT CHANGE UP
GLUCOSE SERPL-MCNC: 148 MG/DL — HIGH (ref 70–99)
HCT VFR BLD CALC: 34.4 % — LOW (ref 39–50)
HGB BLD-MCNC: 11.5 G/DL — LOW (ref 13–17)
IMM GRANULOCYTES # BLD AUTO: 0.28 # — SIGNIFICANT CHANGE UP
IMM GRANULOCYTES NFR BLD AUTO: 1.2 % — SIGNIFICANT CHANGE UP (ref 0–1.5)
LYMPHOCYTES # BLD AUTO: 0.62 K/UL — LOW (ref 1–3.3)
LYMPHOCYTES # BLD AUTO: 2.6 % — LOW (ref 13–44)
LYMPHOCYTES NFR SPEC AUTO: 0.9 % — LOW (ref 13–44)
MCHC RBC-ENTMCNC: 30.7 PG — SIGNIFICANT CHANGE UP (ref 27–34)
MCHC RBC-ENTMCNC: 33.4 % — SIGNIFICANT CHANGE UP (ref 32–36)
MCV RBC AUTO: 92 FL — SIGNIFICANT CHANGE UP (ref 80–100)
METAMYELOCYTES # FLD: 0 % — SIGNIFICANT CHANGE UP (ref 0–1)
MONOCYTES # BLD AUTO: 1.31 K/UL — HIGH (ref 0–0.9)
MONOCYTES NFR BLD AUTO: 5.4 % — SIGNIFICANT CHANGE UP (ref 2–14)
MONOCYTES NFR BLD: 0 % — LOW (ref 2–9)
MYELOCYTES NFR BLD: 0 % — SIGNIFICANT CHANGE UP (ref 0–0)
NEUTROPHIL AB SER-ACNC: 97.4 % — HIGH (ref 43–77)
NEUTROPHILS # BLD AUTO: 22 K/UL — HIGH (ref 1.8–7.4)
NEUTROPHILS NFR BLD AUTO: 90.7 % — HIGH (ref 43–77)
NEUTS BAND # BLD: 1.7 % — SIGNIFICANT CHANGE UP (ref 0–6)
NRBC # FLD: 0 — SIGNIFICANT CHANGE UP
OTHER - HEMATOLOGY %: 0 — SIGNIFICANT CHANGE UP
PLATELET # BLD AUTO: 167 K/UL — SIGNIFICANT CHANGE UP (ref 150–400)
PLATELET COUNT - ESTIMATE: NORMAL — SIGNIFICANT CHANGE UP
PMV BLD: 10.5 FL — SIGNIFICANT CHANGE UP (ref 7–13)
POLYCHROMASIA BLD QL SMEAR: SLIGHT — SIGNIFICANT CHANGE UP
POTASSIUM SERPL-MCNC: 3.9 MMOL/L — SIGNIFICANT CHANGE UP (ref 3.5–5.3)
POTASSIUM SERPL-SCNC: 3.9 MMOL/L — SIGNIFICANT CHANGE UP (ref 3.5–5.3)
PROMYELOCYTES # FLD: 0 % — SIGNIFICANT CHANGE UP (ref 0–0)
RBC # BLD: 3.74 M/UL — LOW (ref 4.2–5.8)
RBC # FLD: 13.6 % — SIGNIFICANT CHANGE UP (ref 10.3–14.5)
SODIUM SERPL-SCNC: 133 MMOL/L — LOW (ref 135–145)
VARIANT LYMPHS # BLD: 0 % — SIGNIFICANT CHANGE UP
WBC # BLD: 24.23 K/UL — HIGH (ref 3.8–10.5)
WBC # FLD AUTO: 24.23 K/UL — HIGH (ref 3.8–10.5)

## 2018-08-19 PROCEDURE — 99233 SBSQ HOSP IP/OBS HIGH 50: CPT

## 2018-08-19 PROCEDURE — 71275 CT ANGIOGRAPHY CHEST: CPT | Mod: 26

## 2018-08-19 PROCEDURE — 99223 1ST HOSP IP/OBS HIGH 75: CPT

## 2018-08-19 PROCEDURE — 93010 ELECTROCARDIOGRAM REPORT: CPT

## 2018-08-19 RX ORDER — POLYETHYLENE GLYCOL 3350 17 G/17G
17 POWDER, FOR SOLUTION ORAL DAILY
Qty: 0 | Refills: 0 | Status: DISCONTINUED | OUTPATIENT
Start: 2018-08-19 | End: 2018-08-26

## 2018-08-19 RX ADMIN — Medication 100 MILLIGRAM(S): at 13:29

## 2018-08-19 RX ADMIN — Medication 650 MILLIGRAM(S): at 18:20

## 2018-08-19 RX ADMIN — OXYCODONE HYDROCHLORIDE 10 MILLIGRAM(S): 5 TABLET ORAL at 05:35

## 2018-08-19 RX ADMIN — PANTOPRAZOLE SODIUM 40 MILLIGRAM(S): 20 TABLET, DELAYED RELEASE ORAL at 05:30

## 2018-08-19 RX ADMIN — OXYCODONE HYDROCHLORIDE 10 MILLIGRAM(S): 5 TABLET ORAL at 10:40

## 2018-08-19 RX ADMIN — Medication 100 MILLIGRAM(S): at 21:07

## 2018-08-19 RX ADMIN — Medication 102 MILLIGRAM(S): at 05:30

## 2018-08-19 RX ADMIN — Medication 12.5 MILLIGRAM(S): at 05:30

## 2018-08-19 RX ADMIN — Medication 2000 UNIT(S): at 13:29

## 2018-08-19 RX ADMIN — Medication 100 MILLIGRAM(S): at 05:30

## 2018-08-19 RX ADMIN — SENNA PLUS 2 TABLET(S): 8.6 TABLET ORAL at 21:07

## 2018-08-19 RX ADMIN — OXYCODONE HYDROCHLORIDE 10 MILLIGRAM(S): 5 TABLET ORAL at 09:48

## 2018-08-19 RX ADMIN — POLYETHYLENE GLYCOL 3350 17 GRAM(S): 17 POWDER, FOR SOLUTION ORAL at 13:28

## 2018-08-19 RX ADMIN — OXYCODONE HYDROCHLORIDE 10 MILLIGRAM(S): 5 TABLET ORAL at 20:26

## 2018-08-19 RX ADMIN — LOSARTAN POTASSIUM 50 MILLIGRAM(S): 100 TABLET, FILM COATED ORAL at 05:30

## 2018-08-19 RX ADMIN — Medication 102 MILLIGRAM(S): at 00:23

## 2018-08-19 RX ADMIN — OXYCODONE HYDROCHLORIDE 10 MILLIGRAM(S): 5 TABLET ORAL at 06:05

## 2018-08-19 RX ADMIN — OXYCODONE HYDROCHLORIDE 10 MILLIGRAM(S): 5 TABLET ORAL at 21:30

## 2018-08-19 RX ADMIN — ATORVASTATIN CALCIUM 20 MILLIGRAM(S): 80 TABLET, FILM COATED ORAL at 21:07

## 2018-08-19 NOTE — PROGRESS NOTE ADULT - PROBLEM SELECTOR PLAN 1
Patient spiked fever overnight to 101.3 yesterday 8/18 am. Only recent symptom is reported cough which has been stable and he denies SOB/ pleuritic CP. Also w/ increased leukocytosis but this can be seen post operatively. He has been afebrile since last fever  -CXR from 8/18 personally reviewed and notable for R lung atelectasis but no infection  -Continue to encourage Incentive Spirometer   -trend CBC

## 2018-08-19 NOTE — CONSULT NOTE ADULT - SUBJECTIVE AND OBJECTIVE BOX
Date of Admission:  8/16/18  CHIEF COMPLAINT:  progressively worsening lumbar disc disease  HISTORY OF PRESENT ILLNESS:      77 y/o male w/ Hx [place], Obesity, HTN, hyperlipidemia,  who presents with progressively worsening symptomatolgy of lumbar disc disease. Xrays and MR confirm pathology. S/p L2-5 posterior lumbar laminectomy on 8-16-18. Cardiology called to evaluate for orthostatic hypotension and to assist with management. Patient unable to walk because his BP is unable to tolerate.  Allergies    sulfa drugs (Other)    Intolerances    MEDICATIONS:  acetaminophen   Tablet 650 milliGRAM(s) Oral every 6 hours PRN  acetaminophen   Tablet. 650 milliGRAM(s) Oral every 6 hours PRN  diazepam    Tablet 5 milliGRAM(s) Oral every 8 hours PRN  oxyCODONE    IR 5 milliGRAM(s) Oral every 4 hours PRN  oxyCODONE    IR 10 milliGRAM(s) Oral every 4 hours PRN    docusate sodium 100 milliGRAM(s) Oral three times a day  magnesium hydroxide Suspension 30 milliLiter(s) Oral every 12 hours PRN  pantoprazole    Tablet 40 milliGRAM(s) Oral before breakfast  polyethylene glycol 3350 17 Gram(s) Oral daily  senna 2 Tablet(s) Oral at bedtime    atorvastatin 20 milliGRAM(s) Oral at bedtime    cholecalciferol 2000 Unit(s) Oral daily  lactated ringers. 1000 milliLiter(s) IV Continuous <Continuous>      PAST MEDICAL & SURGICAL HISTORY:  Cataracts, bilateral  Hard of hearing: b/l hearing aids  Snoring: MARCOS precautions -- responds affirmatively to STOP BANG questionnaire  Obesity  GERD (gastroesophageal reflux disease)  Disc disease, degenerative, lumbar or lumbosacral: 2018  Hypercholesterolemia  Hypertension  Cataracts, both eyes: have lenses  Mass: excision of laryngeal mass      FAMILY HISTORY:  Family history of Alzheimer's disease (Mother)  Family history of myocardial infarction (Father)      SOCIAL HISTORY:    [ ] Non-smoker  [ ] Smoker  [ ] Alcohol      REVIEW OF SYSTEMS:  See HPI. Otherwise, 10 point ROS done and otherwise negative.      T(C): 38.3 (08-19-18 @ 17:13), Max: 38.3 (08-19-18 @ 17:13)  HR: 86 (08-19-18 @ 17:13) (84 - 94)  BP: 154/73 (08-19-18 @ 17:13) (93/46 - 164/75)  RR: 16 (08-19-18 @ 17:13) (16 - 16)  SpO2: 95% (08-19-18 @ 17:13) (94% - 96%)  Wt(kg): --  I&O's Summary    18 Aug 2018 07:01  -  19 Aug 2018 07:00  --------------------------------------------------------  IN: 0 mL / OUT: 2500 mL / NET: -2500 mL        Physical Exam:    GENERAL: NAD, well-groomed, pleasant   HEAD:  Atraumatic, Normocephalic  EYES: EOMI, PERRLA, conjunctiva and sclera clear  ENMT: Moist mucous membranes  NECK: Supple, No JVD  RESPIRATORY: Clear to auscultation bilaterally;   CARDIOVASCULAR: Regular rate and rhythm; No murmurs, rubs, or gallops  GASTROINTESTINAL: Soft, Nontender, Nondistended; Bowel sounds present  GENITOURINARY: Not examined  EXTREMITIES:  2+ Peripheral Pulses, No clubbing, cyanosis, or edema  NERVOUS SYSTEM:  Alert & Oriented X3; Moving all 4 extremities;   HEME/LYMPH: No lymphadenopathy noted  SKIN: No rashes or lesions; Incisions C/D/I  LABS:	   	    CBC Full  -  ( 19 Aug 2018 06:40 )  WBC Count : 24.23 K/uL  Hemoglobin : 11.5 g/dL  Hematocrit : 34.4 %  Platelet Count - Automated : 167 K/uL  Mean Cell Volume : 92.0 fL  Mean Cell Hemoglobin : 30.7 pg  Mean Cell Hemoglobin Concentration : 33.4 %  Auto Neutrophil # : 22.00 K/uL  Auto Lymphocyte # : 0.62 K/uL  Auto Monocyte # : 1.31 K/uL  Auto Eosinophil # : 0.00 K/uL  Auto Basophil # : 0.02 K/uL  Auto Neutrophil % : 90.7 %  Auto Lymphocyte % : 2.6 %  Auto Monocyte % : 5.4 %  Auto Eosinophil % : 0.0 %  Auto Basophil % : 0.1 %    08-19    133<L>  |  96<L>  |  15  ----------------------------<  148<H>  3.9   |  24  |  0.97  08-18    137  |  100  |  18  ----------------------------<  128<H>  3.7   |  25  |  1.12    Ca    9.4      19 Aug 2018 06:40  Ca    8.6      18 Aug 2018 06:30

## 2018-08-19 NOTE — PROGRESS NOTE ADULT - PROBLEM SELECTOR PLAN 3
Called later on today after evaluating pt for another episode of symptomatic orthostatic hypotension while PT was trying to get patient up.     Likely multifactorial due to post op volume depletion, antihypertensive meds, opiods/valium for pain control and constipation    Pt already received his antihypertensives this am. Would hold for now. Both are daily dosing so this can always be restarted tomorrow am if BPs are not acceptable.   Monitoring H/H for acute blood loss anemia. However, has been overall stable  Slow changes with positional changes   c/w ivfs for now  Can try compression stocking to assist w/ venous return with positional changes   Pain meds may be contributing but he needs pain control   Optimize constipation regimen

## 2018-08-19 NOTE — PROGRESS NOTE ADULT - SUBJECTIVE AND OBJECTIVE BOX
Patient is doing well. Pain well controlled with pain medications. Removed yesterday patient was straight cathed o/n.       Exam:  Gen: NAD  Motor: 5/5 EHL/FHL/TA/Gastrocnemius  Sensory: SILT DP/SP/S/S/T nerve distributions  Dressing: Clean, Dry, Intact, HV 5/60, removed on rounds    Vital Signs Last 24 Hrs  T(C): 37.1 (18 Aug 2018 21:34), Max: 38.5 (18 Aug 2018 03:21)  T(F): 98.7 (18 Aug 2018 21:34), Max: 101.3 (18 Aug 2018 03:21)  HR: 90 (18 Aug 2018 21:34) (80 - 93)  BP: 164/75 (18 Aug 2018 21:34) (114/57 - 182/74)  BP(mean): --  RR: 16 (18 Aug 2018 21:34) (16 - 18)  SpO2: 95% (18 Aug 2018 21:34) (94% - 97%)    I&O's Detail    17 Aug 2018 07:01  -  18 Aug 2018 07:00  --------------------------------------------------------  IN:  Total IN: 0 mL    OUT:    Accordian: 60 mL    Indwelling Catheter - Urethral: 2725 mL  Total OUT: 2785 mL    Total NET: -2785 mL      18 Aug 2018 07:01  -  19 Aug 2018 00:47  --------------------------------------------------------  IN:  Total IN: 0 mL    OUT:    Indwelling Catheter - Urethral: 2200 mL  Total OUT: 2200 mL    Total NET: -2200 mL      08-18    137  |  100  |  18  ----------------------------<  128<H>  3.7   |  25  |  1.12    Ca    8.6      18 Aug 2018 06:30       10.4   17.43 )-----------( 154      ( 18 Aug 2018 06:30 )             32.0       A/P: 78 year old male s/p L2-5 laminectomy, L3-4 Discectomy  - Pain Control  - Regular Diet  - PT/OT, OOB  - WBAT  - dispo planning

## 2018-08-19 NOTE — PROGRESS NOTE ADULT - ASSESSMENT
77 y/o male w/ Hx [place], Obesity, HTN, hyperlipidemia,  who presents with progressively worsening symptomatolgy of lumbar disc disease. Xrays and MR confirm pathology. S/p L2-5 posterior lumbar laminectomy on 8-16-18.

## 2018-08-19 NOTE — PROGRESS NOTE ADULT - SUBJECTIVE AND OBJECTIVE BOX
Patient is a 78y old  Male who presents with a chief complaint of Spinal stenosis (17 Aug 2018 11:25)      SUBJECTIVE / OVERNIGHT EVENTS: Pt still w/o BM. Pain 7-8/10.     Review of Systems:     MEDICATIONS  (STANDING):  atorvastatin 20 milliGRAM(s) Oral at bedtime  cholecalciferol 2000 Unit(s) Oral daily  docusate sodium 100 milliGRAM(s) Oral three times a day  lactated ringers. 1000 milliLiter(s) (125 mL/Hr) IV Continuous <Continuous>  pantoprazole    Tablet 40 milliGRAM(s) Oral before breakfast  polyethylene glycol 3350 17 Gram(s) Oral daily  senna 2 Tablet(s) Oral at bedtime    MEDICATIONS  (PRN):  acetaminophen   Tablet 650 milliGRAM(s) Oral every 6 hours PRN For Temp greater than 38 C (100.4 F)  acetaminophen   Tablet. 650 milliGRAM(s) Oral every 6 hours PRN Mild Pain (1 - 3)  diazepam    Tablet 5 milliGRAM(s) Oral every 8 hours PRN Back Spasms  magnesium hydroxide Suspension 30 milliLiter(s) Oral every 12 hours PRN Constipation  oxyCODONE    IR 5 milliGRAM(s) Oral every 4 hours PRN Moderate Pain (4 - 6)  oxyCODONE    IR 10 milliGRAM(s) Oral every 4 hours PRN Severe Pain (7 - 10)      PHYSICAL EXAM:  Vital Signs Last 24 Hrs  T(C): 37.2 (19 Aug 2018 13:30), Max: 37.3 (19 Aug 2018 01:19)  T(F): 98.9 (19 Aug 2018 13:30), Max: 99.1 (19 Aug 2018 01:19)  HR: 89 (19 Aug 2018 13:30) (83 - 94)  BP: 136/65 (19 Aug 2018 13:30) (93/46 - 182/74)  BP(mean): --  RR: 16 (19 Aug 2018 13:30) (16 - 18)  SpO2: 94% (19 Aug 2018 13:30) (94% - 97%)  I&O's Summary    18 Aug 2018 07:01  -  19 Aug 2018 07:00  --------------------------------------------------------  IN: 0 mL / OUT: 2500 mL / NET: -2500 mL    GENERAL: NAD, well-groomed, pleasant   HEAD:  Atraumatic, Normocephalic  EYES: EOMI, PERRLA, conjunctiva and sclera clear  ENMT: Moist mucous membranes  NECK: Supple, No JVD  RESPIRATORY: Clear to auscultation bilaterally;   CARDIOVASCULAR: Regular rate and rhythm; No murmurs, rubs, or gallops  GASTROINTESTINAL: Soft, Nontender, Nondistended; Bowel sounds present  GENITOURINARY: Not examined  EXTREMITIES:  2+ Peripheral Pulses, No clubbing, cyanosis, or edema  NERVOUS SYSTEM:  Alert & Oriented X3; Moving all 4 extremities;   HEME/LYMPH: No lymphadenopathy noted  SKIN: No rashes or lesions; Incisions C/D/I      LABS:  CAPILLARY BLOOD GLUCOSE                              11.5   24.23 )-----------( 167      ( 19 Aug 2018 06:40 )             34.4     08-19    133<L>  |  96<L>  |  15  ----------------------------<  148<H>  3.9   |  24  |  0.97    Ca    9.4      19 Aug 2018 06:40                RADIOLOGY & ADDITIONAL TESTS:    Imaging Personally Reviewed:    Consultant(s) Notes Reviewed:      Care Discussed with Consultants/Other Providers:

## 2018-08-20 DIAGNOSIS — D72.829 ELEVATED WHITE BLOOD CELL COUNT, UNSPECIFIED: ICD-10-CM

## 2018-08-20 LAB
BUN SERPL-MCNC: 28 MG/DL — HIGH (ref 7–23)
CALCIUM SERPL-MCNC: 9.6 MG/DL — SIGNIFICANT CHANGE UP (ref 8.4–10.5)
CHLORIDE SERPL-SCNC: 95 MMOL/L — LOW (ref 98–107)
CO2 SERPL-SCNC: 25 MMOL/L — SIGNIFICANT CHANGE UP (ref 22–31)
CREAT SERPL-MCNC: 1.15 MG/DL — SIGNIFICANT CHANGE UP (ref 0.5–1.3)
GLUCOSE SERPL-MCNC: 112 MG/DL — HIGH (ref 70–99)
HCT VFR BLD CALC: 34.1 % — LOW (ref 39–50)
HCT VFR BLD CALC: 35.8 % — LOW (ref 39–50)
HGB BLD-MCNC: 11.4 G/DL — LOW (ref 13–17)
HGB BLD-MCNC: 11.9 G/DL — LOW (ref 13–17)
MCHC RBC-ENTMCNC: 31 PG — SIGNIFICANT CHANGE UP (ref 27–34)
MCHC RBC-ENTMCNC: 31.2 PG — SIGNIFICANT CHANGE UP (ref 27–34)
MCHC RBC-ENTMCNC: 33.2 % — SIGNIFICANT CHANGE UP (ref 32–36)
MCHC RBC-ENTMCNC: 33.4 % — SIGNIFICANT CHANGE UP (ref 32–36)
MCV RBC AUTO: 92.7 FL — SIGNIFICANT CHANGE UP (ref 80–100)
MCV RBC AUTO: 93.7 FL — SIGNIFICANT CHANGE UP (ref 80–100)
NRBC # FLD: 0 — SIGNIFICANT CHANGE UP
NRBC # FLD: 0 — SIGNIFICANT CHANGE UP
PLATELET # BLD AUTO: 232 K/UL — SIGNIFICANT CHANGE UP (ref 150–400)
PLATELET # BLD AUTO: 246 K/UL — SIGNIFICANT CHANGE UP (ref 150–400)
PMV BLD: 11.4 FL — SIGNIFICANT CHANGE UP (ref 7–13)
PMV BLD: 11.4 FL — SIGNIFICANT CHANGE UP (ref 7–13)
POTASSIUM SERPL-MCNC: 3.8 MMOL/L — SIGNIFICANT CHANGE UP (ref 3.5–5.3)
POTASSIUM SERPL-SCNC: 3.8 MMOL/L — SIGNIFICANT CHANGE UP (ref 3.5–5.3)
RBC # BLD: 3.68 M/UL — LOW (ref 4.2–5.8)
RBC # BLD: 3.82 M/UL — LOW (ref 4.2–5.8)
RBC # FLD: 13.7 % — SIGNIFICANT CHANGE UP (ref 10.3–14.5)
RBC # FLD: 13.8 % — SIGNIFICANT CHANGE UP (ref 10.3–14.5)
SODIUM SERPL-SCNC: 135 MMOL/L — SIGNIFICANT CHANGE UP (ref 135–145)
WBC # BLD: 24.27 K/UL — HIGH (ref 3.8–10.5)
WBC # BLD: 24.31 K/UL — HIGH (ref 3.8–10.5)
WBC # FLD AUTO: 24.27 K/UL — HIGH (ref 3.8–10.5)
WBC # FLD AUTO: 24.31 K/UL — HIGH (ref 3.8–10.5)

## 2018-08-20 PROCEDURE — 99233 SBSQ HOSP IP/OBS HIGH 50: CPT

## 2018-08-20 PROCEDURE — 74019 RADEX ABDOMEN 2 VIEWS: CPT | Mod: 26

## 2018-08-20 RX ORDER — TRAMADOL HYDROCHLORIDE 50 MG/1
50 TABLET ORAL EVERY 6 HOURS
Qty: 0 | Refills: 0 | Status: DISCONTINUED | OUTPATIENT
Start: 2018-08-20 | End: 2018-08-23

## 2018-08-20 RX ORDER — GLYCERIN ADULT
1 SUPPOSITORY, RECTAL RECTAL ONCE
Qty: 0 | Refills: 0 | Status: COMPLETED | OUTPATIENT
Start: 2018-08-20 | End: 2018-08-20

## 2018-08-20 RX ORDER — TRAMADOL HYDROCHLORIDE 50 MG/1
25 TABLET ORAL EVERY 6 HOURS
Qty: 0 | Refills: 0 | Status: DISCONTINUED | OUTPATIENT
Start: 2018-08-20 | End: 2018-08-23

## 2018-08-20 RX ORDER — SODIUM CHLORIDE 9 MG/ML
1000 INJECTION, SOLUTION INTRAVENOUS
Qty: 0 | Refills: 0 | Status: DISCONTINUED | OUTPATIENT
Start: 2018-08-20 | End: 2018-08-21

## 2018-08-20 RX ADMIN — SODIUM CHLORIDE 125 MILLILITER(S): 9 INJECTION, SOLUTION INTRAVENOUS at 22:37

## 2018-08-20 RX ADMIN — TRAMADOL HYDROCHLORIDE 50 MILLIGRAM(S): 50 TABLET ORAL at 22:30

## 2018-08-20 RX ADMIN — Medication 650 MILLIGRAM(S): at 18:26

## 2018-08-20 RX ADMIN — Medication 2000 UNIT(S): at 13:23

## 2018-08-20 RX ADMIN — ATORVASTATIN CALCIUM 20 MILLIGRAM(S): 80 TABLET, FILM COATED ORAL at 21:13

## 2018-08-20 RX ADMIN — SENNA PLUS 2 TABLET(S): 8.6 TABLET ORAL at 21:13

## 2018-08-20 RX ADMIN — Medication 100 MILLIGRAM(S): at 13:23

## 2018-08-20 RX ADMIN — Medication 1 SUPPOSITORY(S): at 08:56

## 2018-08-20 RX ADMIN — Medication 650 MILLIGRAM(S): at 18:56

## 2018-08-20 RX ADMIN — Medication 100 MILLIGRAM(S): at 21:13

## 2018-08-20 RX ADMIN — TRAMADOL HYDROCHLORIDE 50 MILLIGRAM(S): 50 TABLET ORAL at 21:13

## 2018-08-20 RX ADMIN — POLYETHYLENE GLYCOL 3350 17 GRAM(S): 17 POWDER, FOR SOLUTION ORAL at 13:23

## 2018-08-20 NOTE — PROGRESS NOTE ADULT - ASSESSMENT
77 y/o male w/ Hx [place], Obesity, HTN, hyperlipidemia,  who presents with progressively worsening symptomatolgy of lumbar disc disease. Xrays and MR confirm pathology. S/p L2-5 posterior lumbar laminectomy on 8-16-18.

## 2018-08-20 NOTE — PROGRESS NOTE ADULT - ASSESSMENT
A: 77 yo male w h/o Obesity, HTN, hyperlipidemia,  who presents with progressively worsening symptomatolgy of lumbar disc disease s/p L2-5 posterior lumbar laminectomy on 8-16-18.  Hospital course c/b orthostatic hypotension.    1. Orthostatic hypotension      - Medicine input appreciated, likely multifactorial due to post op volume depletion, antihypertensive meds, opiods/valium for pain control and constipation      - Please document BP lying, sitting, AND standing      - If orthostatic BP negative should be resumed on home anti-HTN regimen as -160s      - TTE pending

## 2018-08-20 NOTE — PROGRESS NOTE ADULT - PROBLEM SELECTOR PLAN 1
Patient spiked fever overnight to 101.3 yesterday 8/18 am. Only recent symptom is reported cough which has been stable and he denies SOB/ pleuritic CP. Also w/ increased leukocytosis but this can be seen post operatively. He has been afebrile since last fever  -CXR from 8/18 personally reviewed and notable for R lung atelectasis but no infection  -Continue to encourage Incentive Spirometer   -trend CBC Likely induced by dexamethasone administration on 8/18. However, WBC in ~24K and patient complaining of abdominal pain w/ no BMs. Patient has remained afebrile for past 24 hrs. Last spiked fever 101.3 on 8/18 am. CXR from 8/18 notable for R lung atelectasis but no infection. Prelim read of 8/19 CTA chest negative for acute PE.  Personally reviewed CTA chest and saw dilated loops of small bowel  - obtain AP and lateral of abdominal x-ray to evaluate for possible ileus vs obstruction  - Continue to encourage Incentive Spirometer   - trend CBC

## 2018-08-20 NOTE — PROGRESS NOTE ADULT - SUBJECTIVE AND OBJECTIVE BOX
Patient is doing well. Pain well controlled with pain medications. Removed yesterday patient was straight cathed o/n.       Exam:  Gen: NAD  Motor: 5/5 EHL/FHL/TA/Gastrocnemius  Sensory: SILT DP/SP/S/S/T nerve distributions  Dressing: Clean, Dry, Intact, HV 5/60, removed on rounds    Vital Signs Last 24 Hrs  Vital Signs Last 24 Hrs  T(C): 36.7 (20 Aug 2018 04:57), Max: 38.3 (19 Aug 2018 17:13)  T(F): 98 (20 Aug 2018 04:57), Max: 100.9 (19 Aug 2018 17:13)  HR: 76 (20 Aug 2018 04:57) (76 - 94)  BP: 148/72 (20 Aug 2018 04:57) (93/46 - 169/76)  BP(mean): --  RR: 17 (20 Aug 2018 04:57) (16 - 17)  SpO2: 95% (20 Aug 2018 04:57) (93% - 96%)    I&O's Detail    18 Aug 2018 07:01  -  19 Aug 2018 07:00  --------------------------------------------------------  IN:  Total IN: 0 mL    OUT:    Indwelling Catheter - Urethral: 2200 mL    Voided: 300 mL  Total OUT: 2500 mL    Total NET: -2500 mL      19 Aug 2018 07:01  -  20 Aug 2018 06:01  --------------------------------------------------------  IN:  Total IN: 0 mL    OUT:    Voided: 800 mL  Total OUT: 800 mL    Total NET: -800 mL          08-18    137  |  100  |  18  ----------------------------<  128<H>  3.7   |  25  |  1.12    Ca    8.6      18 Aug 2018 06:30       10.4   17.43 )-----------( 154      ( 18 Aug 2018 06:30 )             32.0       A/P: 78 year old male s/p L2-5 laminectomy, L3-4 Discectomy  - Pain Control  - Regular Diet  - PT/OT, OOB  - WBAT  - dispo planning Patient is doing well. Pain well controlled with pain medications. Complains of mild constipation.       Exam:  Gen: NAD  Motor: 5/5 EHL/FHL/TA/Gastrocnemius  Sensory: SILT DP/SP/S/S/T nerve distributions  Dressing: Clean, Dry, Intact, HV 5/60, removed on rounds    Vital Signs Last 24 Hrs  T(C): 36.7 (20 Aug 2018 04:57), Max: 38.3 (19 Aug 2018 17:13)  T(F): 98 (20 Aug 2018 04:57), Max: 100.9 (19 Aug 2018 17:13)  HR: 76 (20 Aug 2018 04:57) (76 - 94)  BP: 148/72 (20 Aug 2018 04:57) (93/46 - 169/76)  BP(mean): --  RR: 17 (20 Aug 2018 04:57) (16 - 17)  SpO2: 95% (20 Aug 2018 04:57) (93% - 96%)    I&O's Detail    18 Aug 2018 07:01  -  19 Aug 2018 07:00  --------------------------------------------------------  IN:  Total IN: 0 mL    OUT:    Indwelling Catheter - Urethral: 2200 mL    Voided: 300 mL  Total OUT: 2500 mL    Total NET: -2500 mL      19 Aug 2018 07:01  -  20 Aug 2018 06:17  --------------------------------------------------------  IN:  Total IN: 0 mL    OUT:    Voided: 800 mL  Total OUT: 800 mL    Total NET: -800 mL              08-18    137  |  100  |  18  ----------------------------<  128<H>  3.7   |  25  |  1.12    Ca    8.6      18 Aug 2018 06:30       10.4   17.43 )-----------( 154      ( 18 Aug 2018 06:30 )             32.0       A/P: 78 year old male s/p L2-5 laminectomy, L3-4 Discectomy  - Pain Control  - Regular Diet  - PT/OT, OOB  - WBAT  - dispo planning

## 2018-08-20 NOTE — PROGRESS NOTE ADULT - PROBLEM SELECTOR PLAN 4
BPs ranging 114-171 systolic. Holding BP meds due to orthostatic hypotension.  -continue to monitor Holding BP meds in the setting o orthostatic hypotension.  -continue to monitor. If BP elevated persistently elevated will resume home BP meds

## 2018-08-20 NOTE — PROGRESS NOTE ADULT - PROBLEM SELECTOR PLAN 3
Called later on today after evaluating pt for another episode of symptomatic orthostatic hypotension while PT was trying to get patient up.     Likely multifactorial due to post op volume depletion, antihypertensive meds, opiods/valium for pain control and constipation    Pt already received his antihypertensives this am. Would hold for now. Both are daily dosing so this can always be restarted tomorrow am if BPs are not acceptable.   Monitoring H/H for acute blood loss anemia. However, has been overall stable  Slow changes with positional changes   c/w ivfs for now  Can try compression stocking to assist w/ venous return with positional changes   Pain meds may be contributing but he needs pain control   Optimize constipation regimen Resolved. Likely multifactorial due to post op volume depletion, antihypertensive meds, opiods/valium for pain control and constipation  -continue to hold antihypertesnsive meds. Both are daily dosing so this can always be restarted when BPs are not acceptable.   -Monitoring H/H for acute blood loss anemia. However, has been overall stable  Slow changes with positional changes   c/w ivfs for now  Can try compression stocking to assist w/ venous return with positional changes   Pain meds may be contributing but he needs pain control   Optimize constipation regimen Resolved. Likely multifactorial due to post op volume depletion, antihypertensive meds, opiods/valium for pain control and constipation  -continue to hold antihypertesnsive meds. Both are daily dosing so this can always be restarted when BPs are not acceptable.   -Monitoring H/H for acute blood loss anemia. However, has been overall stable  Slow changes with positional changes   - awaiting TTE  - Can try compression stocking to assist w/ venous return with positional changes   Pain meds may be contributing but he needs pain control   Optimize constipation regimen

## 2018-08-20 NOTE — PROGRESS NOTE ADULT - SUBJECTIVE AND OBJECTIVE BOX
Patient seen and examined at bedside.    Overnight Events: No acute events    Review Of Systems: No chest pain, or palpitations. Not dizzy at this time.     Current Meds:  acetaminophen   Tablet 650 milliGRAM(s) Oral every 6 hours PRN  acetaminophen   Tablet. 650 milliGRAM(s) Oral every 6 hours PRN  atorvastatin 20 milliGRAM(s) Oral at bedtime  cholecalciferol 2000 Unit(s) Oral daily  diazepam    Tablet 5 milliGRAM(s) Oral every 8 hours PRN  docusate sodium 100 milliGRAM(s) Oral three times a day  glycerin Suppository - Adult 1 Suppository(s) Rectal once  magnesium hydroxide Suspension 30 milliLiter(s) Oral every 12 hours PRN  oxyCODONE    IR 5 milliGRAM(s) Oral every 4 hours PRN  oxyCODONE    IR 10 milliGRAM(s) Oral every 4 hours PRN  pantoprazole    Tablet 40 milliGRAM(s) Oral before breakfast  polyethylene glycol 3350 17 Gram(s) Oral daily  senna 2 Tablet(s) Oral at bedtime      PAST MEDICAL & SURGICAL HISTORY:  Cataracts, bilateral  Hard of hearing: b/l hearing aids  Snoring: MARCOS precautions -- responds affirmatively to STOP BANG questionnaire  Obesity  GERD (gastroesophageal reflux disease)  Disc disease, degenerative, lumbar or lumbosacral: 2018  Hypercholesterolemia  Hypertension  Cataracts, both eyes: have lenses  Mass: excision of laryngeal mass      Vitals:  T(F): 98.2 (08-20), Max: 100.9 (08-19)  HR: 78 (08-20) (76 - 94)  BP: 146/76 (08-20) (93/46 - 169/76)  RR: 18 (08-20)  SpO2: 96% (08-20)  I&O's Summary    19 Aug 2018 07:01  -  20 Aug 2018 07:00  --------------------------------------------------------  IN: 0 mL / OUT: 1250 mL / NET: -1250 mL        Physical Exam:  Appearance: No acute distress; well appearing  Eyes: PERRL, EOMI, pink conjunctiva  HENT: Normal oral mucosa  Cardiovascular: RRR, S1, S2, no murmurs, rubs, or gallops; no edema; no JVD  Respiratory: Clear to auscultation bilaterally  Gastrointestinal: soft, non-tender, non-distended with normal bowel sounds  Musculoskeletal: No clubbing; no joint deformity   Neurologic: Non-focal  Lymphatic: No lymphadenopathy  Psychiatry: AAOx3, mood & affect appropriate  Skin: No rashes, ecchymoses, or cyanosis                          11.9   24.27 )-----------( 246      ( 20 Aug 2018 06:00 )             35.8     08-20    135  |  95<L>  |  28<H>  ----------------------------<  112<H>  3.8   |  25  |  1.15    Ca    9.6      20 Aug 2018 06:00                    New ECG(s): Personally reviewed    Echo:     Stress Testing:     Cath:    Imaging:    Interpretation of Telemetry: Sinus 70s

## 2018-08-20 NOTE — PROGRESS NOTE ADULT - SUBJECTIVE AND OBJECTIVE BOX
CHIEF COMPLAINT: f/u     SUBJECTIVE / OVERNIGHT EVENTS: Patient seen and examined. No acute events overnight. Pain well controlled and patient without any complaints.    MEDICATIONS  (STANDING):  atorvastatin 20 milliGRAM(s) Oral at bedtime  cholecalciferol 2000 Unit(s) Oral daily  docusate sodium 100 milliGRAM(s) Oral three times a day  glycerin Suppository - Adult 1 Suppository(s) Rectal once  pantoprazole    Tablet 40 milliGRAM(s) Oral before breakfast  polyethylene glycol 3350 17 Gram(s) Oral daily  senna 2 Tablet(s) Oral at bedtime    MEDICATIONS  (PRN):  acetaminophen   Tablet 650 milliGRAM(s) Oral every 6 hours PRN For Temp greater than 38 C (100.4 F)  acetaminophen   Tablet. 650 milliGRAM(s) Oral every 6 hours PRN Mild Pain (1 - 3)  diazepam    Tablet 5 milliGRAM(s) Oral every 8 hours PRN Back Spasms  magnesium hydroxide Suspension 30 milliLiter(s) Oral every 12 hours PRN Constipation  oxyCODONE    IR 5 milliGRAM(s) Oral every 4 hours PRN Moderate Pain (4 - 6)  oxyCODONE    IR 10 milliGRAM(s) Oral every 4 hours PRN Severe Pain (7 - 10)      VITALS:  T(F): 98 (08-20-18 @ 04:57), Max: 100.9 (08-19-18 @ 17:13)  HR: 76 (08-20-18 @ 04:57) (76 - 94)  BP: 148/72 (08-20-18 @ 04:57) (93/46 - 169/76)  RR: 17 (08-20-18 @ 04:57) (16 - 17)  SpO2: 95% (08-20-18 @ 04:57)  Wt(kg): --      CAPILLARY BLOOD GLUCOSE    PHYSICAL EXAM:  GENERAL: NAD, well-developed  HEAD:  Atraumatic, Normocephalic  EYES: EOMI, PERRLA, conjunctiva and sclera clear  NECK: Supple, No JVD  CHEST/LUNG: Clear to auscultation bilaterally; No wheeze  HEART: Regular rate and rhythm; No murmurs, rubs, or gallops  ABDOMEN: Soft, Nontender, Nondistended; Bowel sounds present  EXTREMITIES:  2+ Peripheral Pulses, No clubbing, cyanosis, or edema  PSYCH: AAOx3  NEUROLOGY: non-focal  SKIN: No rashes or lesions    LABS:              11.9                 135  | 25   | 28           24.27 >-----------< 246     ------------------------< 112                   35.8                 3.8  | 95   | 1.15                                         Ca 9.6   Mg x     Ph x                      RADIOLOGY & ADDITIONAL TESTS:  Imaging Personally Reviewed: [x] Yes    [ ] Consultant(s) Notes Reviewed:  [x] Care Discussed with Consultants/Other Providers: Orthopedic PA - discussed CHIEF COMPLAINT: f/u     SUBJECTIVE / OVERNIGHT EVENTS:   Patient seen and examined. Patient's son present at bedside. Patient complaining of lower abdominal pain and bloating. Patient still with no BMs since Wednesday. Patient reports decrease in food intake. No acute events overnight. Pain exacerbated by movement and standing but otherwise controlled. Pt with intermittent dizziness but BP negative for orthostatic hypotension today.  No fever or chills. No dysuria.     MEDICATIONS  (STANDING):  atorvastatin 20 milliGRAM(s) Oral at bedtime  cholecalciferol 2000 Unit(s) Oral daily  docusate sodium 100 milliGRAM(s) Oral three times a day  glycerin Suppository - Adult 1 Suppository(s) Rectal once  pantoprazole    Tablet 40 milliGRAM(s) Oral before breakfast  polyethylene glycol 3350 17 Gram(s) Oral daily  senna 2 Tablet(s) Oral at bedtime    MEDICATIONS  (PRN):  acetaminophen   Tablet 650 milliGRAM(s) Oral every 6 hours PRN For Temp greater than 38 C (100.4 F)  acetaminophen   Tablet. 650 milliGRAM(s) Oral every 6 hours PRN Mild Pain (1 - 3)  diazepam    Tablet 5 milliGRAM(s) Oral every 8 hours PRN Back Spasms  magnesium hydroxide Suspension 30 milliLiter(s) Oral every 12 hours PRN Constipation  oxyCODONE    IR 5 milliGRAM(s) Oral every 4 hours PRN Moderate Pain (4 - 6)  oxyCODONE    IR 10 milliGRAM(s) Oral every 4 hours PRN Severe Pain (7 - 10)      VITALS:  T(F): 98 (08-20-18 @ 04:57), Max: 100.9 (08-19-18 @ 17:13)  HR: 76 (08-20-18 @ 04:57) (76 - 94)  BP: 148/72 (08-20-18 @ 04:57) (93/46 - 169/76)  RR: 17 (08-20-18 @ 04:57) (16 - 17)  SpO2: 95% (08-20-18 @ 04:57)      PHYSICAL EXAM:  GENERAL: NAD, obese, drowsy, falling asleep during conversation  EYES: EOMI, normal conjunctiva and sclera clear  CHEST/LUNG: Clear to auscultation bilaterally; No wheeze  HEART: Regular rate and rhythm; No murmurs, rubs, or gallops  ABDOMEN: Soft, lower abdominal and suprapubic tenderness, distended and tympanic; Decrease bowel sounds  EXTREMITIES:  2+ Peripheral Pulses, No clubbing, cyanosis, or edema  PSYCH: AAOx3  NEUROLOGY: non-focal      LABS:              11.9                 135  | 25   | 28           24.27 >-----------< 246     ------------------------< 112                   35.8                 3.8  | 95   | 1.15                                         Ca 9.6   Mg x     Ph x            RADIOLOGY & ADDITIONAL TESTS:  Imaging Personally Reviewed: [x] Yes    [ ] Consultant(s) Notes Reviewed:  [x] Care Discussed with Consultants/Other Providers: Orthopedic PA - discussed

## 2018-08-21 DIAGNOSIS — K91.89 OTHER POSTPROCEDURAL COMPLICATIONS AND DISORDERS OF DIGESTIVE SYSTEM: ICD-10-CM

## 2018-08-21 DIAGNOSIS — R33.9 RETENTION OF URINE, UNSPECIFIED: ICD-10-CM

## 2018-08-21 LAB
BUN SERPL-MCNC: 29 MG/DL — HIGH (ref 7–23)
CALCIUM SERPL-MCNC: 9.3 MG/DL — SIGNIFICANT CHANGE UP (ref 8.4–10.5)
CHLORIDE SERPL-SCNC: 96 MMOL/L — LOW (ref 98–107)
CO2 SERPL-SCNC: 24 MMOL/L — SIGNIFICANT CHANGE UP (ref 22–31)
CREAT SERPL-MCNC: 1.06 MG/DL — SIGNIFICANT CHANGE UP (ref 0.5–1.3)
GLUCOSE SERPL-MCNC: 88 MG/DL — SIGNIFICANT CHANGE UP (ref 70–99)
POTASSIUM SERPL-MCNC: 3.9 MMOL/L — SIGNIFICANT CHANGE UP (ref 3.5–5.3)
POTASSIUM SERPL-SCNC: 3.9 MMOL/L — SIGNIFICANT CHANGE UP (ref 3.5–5.3)
SODIUM SERPL-SCNC: 135 MMOL/L — SIGNIFICANT CHANGE UP (ref 135–145)
SURGICAL PATHOLOGY STUDY: SIGNIFICANT CHANGE UP

## 2018-08-21 PROCEDURE — 74018 RADEX ABDOMEN 1 VIEW: CPT | Mod: 26

## 2018-08-21 PROCEDURE — 93306 TTE W/DOPPLER COMPLETE: CPT | Mod: 26

## 2018-08-21 PROCEDURE — 99233 SBSQ HOSP IP/OBS HIGH 50: CPT

## 2018-08-21 RX ORDER — DEXTROSE MONOHYDRATE, SODIUM CHLORIDE, AND POTASSIUM CHLORIDE 50; .745; 4.5 G/1000ML; G/1000ML; G/1000ML
1000 INJECTION, SOLUTION INTRAVENOUS
Qty: 0 | Refills: 0 | Status: DISCONTINUED | OUTPATIENT
Start: 2018-08-21 | End: 2018-08-22

## 2018-08-21 RX ADMIN — Medication 2000 UNIT(S): at 12:36

## 2018-08-21 RX ADMIN — TRAMADOL HYDROCHLORIDE 50 MILLIGRAM(S): 50 TABLET ORAL at 13:15

## 2018-08-21 RX ADMIN — ATORVASTATIN CALCIUM 20 MILLIGRAM(S): 80 TABLET, FILM COATED ORAL at 21:21

## 2018-08-21 RX ADMIN — PANTOPRAZOLE SODIUM 40 MILLIGRAM(S): 20 TABLET, DELAYED RELEASE ORAL at 05:44

## 2018-08-21 RX ADMIN — POLYETHYLENE GLYCOL 3350 17 GRAM(S): 17 POWDER, FOR SOLUTION ORAL at 12:36

## 2018-08-21 RX ADMIN — Medication 100 MILLIGRAM(S): at 12:36

## 2018-08-21 RX ADMIN — Medication 100 MILLIGRAM(S): at 21:21

## 2018-08-21 RX ADMIN — DEXTROSE MONOHYDRATE, SODIUM CHLORIDE, AND POTASSIUM CHLORIDE 125 MILLILITER(S): 50; .745; 4.5 INJECTION, SOLUTION INTRAVENOUS at 18:57

## 2018-08-21 RX ADMIN — TRAMADOL HYDROCHLORIDE 50 MILLIGRAM(S): 50 TABLET ORAL at 12:36

## 2018-08-21 NOTE — PROGRESS NOTE ADULT - ASSESSMENT
79 y/o male w/ Hx [place], Obesity, HTN, hyperlipidemia,  who presents with progressively worsening symptomatolgy of lumbar disc disease. Xrays and MR confirm pathology. S/p L2-5 posterior lumbar laminectomy on 8-16-18. Hospital course c/b post-op ileus and urinary retention requiring Frederick catheter.

## 2018-08-21 NOTE — PROGRESS NOTE ADULT - ASSESSMENT
A: 77 yo male w h/o Obesity, HTN, hyperlipidemia,  who presents with progressively worsening symptomatolgy of lumbar disc disease s/p L2-5 posterior lumbar laminectomy on 8-16-18.  Hospital course c/b orthostatic hypotension.    1. Orthostatic hypotension      - Resolved. Medicine input appreciated, likely multifactorial due to post op volume depletion, antihypertensive meds, opiods/valium for pain control and constipation      - If orthostatic BP negative should be resumed on home anti-HTN regimen as -160s      - TTE pending

## 2018-08-21 NOTE — PROGRESS NOTE ADULT - PROBLEM SELECTOR PLAN 2
Pt with 1300mL of urine removed after straight cath yesterday, 8/20. Failed TOV. Now with Frederick. Retention likely due to underlying ileus.  - maintain Frederick for now

## 2018-08-21 NOTE — PROGRESS NOTE ADULT - SUBJECTIVE AND OBJECTIVE BOX
Patient is doing well. Pain well controlled with pain medications. Post operative ileus seen overnight.     Exam:  Gen: NAD  Motor: 5/5 EHL/FHL/TA/Gastrocnemius  Sensory: SILT DP/SP/S/S/T nerve distributions  Dressing: Clean, Dry, Intact, HV 5/60, removed on rounds    Vital Signs Last 24 Hrs  T(C): 36.4 (21 Aug 2018 05:15), Max: 37 (20 Aug 2018 17:13)  T(F): 97.5 (21 Aug 2018 05:15), Max: 98.6 (20 Aug 2018 17:13)  HR: 85 (21 Aug 2018 05:15) (76 - 85)  BP: 144/69 (21 Aug 2018 05:15) (128/60 - 146/76)  BP(mean): --  RR: 18 (21 Aug 2018 05:15) (18 - 18)  SpO2: 98% (21 Aug 2018 05:15) (95% - 98%)    I&O's Detail    19 Aug 2018 07:01  -  20 Aug 2018 07:00  --------------------------------------------------------  IN:  Total IN: 0 mL    OUT:    Voided: 1250 mL  Total OUT: 1250 mL    Total NET: -1250 mL      20 Aug 2018 07:01  -  21 Aug 2018 06:15  --------------------------------------------------------  IN:    lactated ringers.: 625 mL  Total IN: 625 mL    OUT:    Intermittent Catheterization - Urethral: 1850 mL    Voided: 350 mL  Total OUT: 2200 mL    Total NET: -1575 mL                            11.9   24.27 )-----------( 246      ( 20 Aug 2018 06:00 )             35.8     08-20    135  |  95<L>  |  28<H>  ----------------------------<  112<H>  3.8   |  25  |  1.15    Ca    9.6      20 Aug 2018 06:00          A/P: 78 year old male s/p L2-5 laminectomy, L3-4 Discectomy now with post operative ileus.   - Pain Control  - Regular Diet  - PT/OT, OOB  - WBAT  - dispo planning

## 2018-08-21 NOTE — PROGRESS NOTE ADULT - SUBJECTIVE AND OBJECTIVE BOX
CHIEF COMPLAINT: f/u     SUBJECTIVE / OVERNIGHT EVENTS: Patient seen and examined. No acute events overnight. Pain well controlled and patient without any complaints.    MEDICATIONS  (STANDING):  atorvastatin 20 milliGRAM(s) Oral at bedtime  cholecalciferol 2000 Unit(s) Oral daily  docusate sodium 100 milliGRAM(s) Oral three times a day  lactated ringers. 1000 milliLiter(s) (125 mL/Hr) IV Continuous <Continuous>  pantoprazole    Tablet 40 milliGRAM(s) Oral before breakfast  polyethylene glycol 3350 17 Gram(s) Oral daily  senna 2 Tablet(s) Oral at bedtime    MEDICATIONS  (PRN):  acetaminophen   Tablet 650 milliGRAM(s) Oral every 6 hours PRN For Temp greater than 38 C (100.4 F)  acetaminophen   Tablet. 650 milliGRAM(s) Oral every 6 hours PRN Mild Pain (1 - 3)  diazepam    Tablet 5 milliGRAM(s) Oral every 8 hours PRN Back Spasms  guaiFENesin    Syrup 100 milliGRAM(s) Oral every 6 hours PRN Cough  magnesium hydroxide Suspension 30 milliLiter(s) Oral every 12 hours PRN Constipation  traMADol 25 milliGRAM(s) Oral every 6 hours PRN Moderate Pain (4 - 6)  traMADol 50 milliGRAM(s) Oral every 6 hours PRN Severe Pain (7 - 10)      VITALS:  T(F): 97.5 (08-21-18 @ 05:15), Max: 98.6 (08-20-18 @ 17:13)  HR: 85 (08-21-18 @ 05:15) (76 - 85)  BP: 144/69 (08-21-18 @ 05:15) (128/60 - 146/76)  RR: 18 (08-21-18 @ 05:15) (18 - 18)  SpO2: 98% (08-21-18 @ 05:15)  Wt(kg): --      CAPILLARY BLOOD GLUCOSE    PHYSICAL EXAM:  GENERAL: NAD, well-developed  HEAD:  Atraumatic, Normocephalic  EYES: EOMI, PERRLA, conjunctiva and sclera clear  NECK: Supple, No JVD  CHEST/LUNG: Clear to auscultation bilaterally; No wheeze  HEART: Regular rate and rhythm; No murmurs, rubs, or gallops  ABDOMEN: Soft, Nontender, Nondistended; Bowel sounds present  EXTREMITIES:  2+ Peripheral Pulses, No clubbing, cyanosis, or edema  PSYCH: AAOx3  NEUROLOGY: non-focal  SKIN: No rashes or lesions    LABS:              x                    135  | 24   | 29           x     >-----------< x       ------------------------< 88                    x                    3.9  | 96   | 1.06                                         Ca 9.3   Mg x     Ph x          RADIOLOGY & ADDITIONAL TESTS:  Imaging Personally Reviewed: [x] Yes    [ ] Consultant(s) Notes Reviewed:  [x] Care Discussed with Consultants/Other Providers: Orthopedic PA - discussed CHIEF COMPLAINT: f/u     SUBJECTIVE / OVERNIGHT EVENTS:   Patient seen and examined this morning. No acute events overnight. Patient now with hiccups and also complaining of abdominal discomfort, still no BMs. Patient failed TOV. Frederick placed this morning.      MEDICATIONS  (STANDING):  atorvastatin 20 milliGRAM(s) Oral at bedtime  cholecalciferol 2000 Unit(s) Oral daily  docusate sodium 100 milliGRAM(s) Oral three times a day  lactated ringers. 1000 milliLiter(s) (125 mL/Hr) IV Continuous <Continuous>  pantoprazole    Tablet 40 milliGRAM(s) Oral before breakfast  polyethylene glycol 3350 17 Gram(s) Oral daily  senna 2 Tablet(s) Oral at bedtime    MEDICATIONS  (PRN):  acetaminophen   Tablet 650 milliGRAM(s) Oral every 6 hours PRN For Temp greater than 38 C (100.4 F)  acetaminophen   Tablet. 650 milliGRAM(s) Oral every 6 hours PRN Mild Pain (1 - 3)  diazepam    Tablet 5 milliGRAM(s) Oral every 8 hours PRN Back Spasms  guaiFENesin    Syrup 100 milliGRAM(s) Oral every 6 hours PRN Cough  magnesium hydroxide Suspension 30 milliLiter(s) Oral every 12 hours PRN Constipation  traMADol 25 milliGRAM(s) Oral every 6 hours PRN Moderate Pain (4 - 6)  traMADol 50 milliGRAM(s) Oral every 6 hours PRN Severe Pain (7 - 10)      VITALS:  T(F): 97.5 (08-21-18 @ 05:15), Max: 98.6 (08-20-18 @ 17:13)  HR: 85 (08-21-18 @ 05:15) (76 - 85)  BP: 144/69 (08-21-18 @ 05:15) (128/60 - 146/76)  RR: 18 (08-21-18 @ 05:15) (18 - 18)  SpO2: 98% (08-21-18 @ 05:15)      PHYSICAL EXAM:  GENERAL: NAD, obese, drowsy  EYES: EOMI, normal conjunctiva and sclera clear  CHEST/LUNG: Clear to auscultation bilaterally; No wheeze  HEART: Regular rate and rhythm; No murmurs, rubs, or gallops  ABDOMEN: Soft, nontender, distended and tympanic; Decrease bowel sounds, but present  EXTREMITIES:  2+ Peripheral Pulses, No clubbing, cyanosis, or edema  PSYCH: AAOx3  NEUROLOGY: non-focal      LABS:              x                    135  | 24   | 29           x     >-----------< x       ------------------------< 88                    x                    3.9  | 96   | 1.06                                         Ca 9.3   Mg x     Ph x          RADIOLOGY & ADDITIONAL TESTS:  Imaging Personally Reviewed: [x] Yes    08/20/2018:  XR ABDOMEN PORTABLE URGENT 2V        FINDINGS:  Gas-distended loops of large and small bowel compatible with   postoperative ileus. There is no evidence of free intraperitoneal air. No   pathologic calcifications are identified. Degenerative changes of the   spine are noted.    IMPRESSION:  Gas-distended loops of large and small bowel compatible with postoperative ileus.     [ ] Consultant(s) Notes Reviewed:  [x] Care Discussed with Consultants/Other Providers: Orthopedic PA - discussed

## 2018-08-21 NOTE — PROGRESS NOTE ADULT - SUBJECTIVE AND OBJECTIVE BOX
Patient seen and examined at bedside.    Overnight Events: No acute events. No dizziness.    Review Of Systems: No chest pain, shortness of breath, or palpitations            Current Meds:  acetaminophen   Tablet 650 milliGRAM(s) Oral every 6 hours PRN  acetaminophen   Tablet. 650 milliGRAM(s) Oral every 6 hours PRN  atorvastatin 20 milliGRAM(s) Oral at bedtime  cholecalciferol 2000 Unit(s) Oral daily  diazepam    Tablet 5 milliGRAM(s) Oral every 8 hours PRN  docusate sodium 100 milliGRAM(s) Oral three times a day  guaiFENesin    Syrup 100 milliGRAM(s) Oral every 6 hours PRN  lactated ringers. 1000 milliLiter(s) IV Continuous <Continuous>  magnesium hydroxide Suspension 30 milliLiter(s) Oral every 12 hours PRN  pantoprazole    Tablet 40 milliGRAM(s) Oral before breakfast  polyethylene glycol 3350 17 Gram(s) Oral daily  senna 2 Tablet(s) Oral at bedtime  traMADol 25 milliGRAM(s) Oral every 6 hours PRN  traMADol 50 milliGRAM(s) Oral every 6 hours PRN      PAST MEDICAL & SURGICAL HISTORY:  Cataracts, bilateral  Hard of hearing: b/l hearing aids  Snoring: MARCOS precautions -- responds affirmatively to STOP BANG questionnaire  Obesity  GERD (gastroesophageal reflux disease)  Disc disease, degenerative, lumbar or lumbosacral: 2018  Hypercholesterolemia  Hypertension  Cataracts, both eyes: have lenses  Mass: excision of laryngeal mass      Vitals:  T(F): 97.5 (08-21), Max: 98.6 (08-20)  HR: 85 (08-21) (76 - 85)  BP: 144/69 (08-21) (128/60 - 146/76)  RR: 18 (08-21)  SpO2: 98% (08-21)  I&O's Summary    20 Aug 2018 07:01  -  21 Aug 2018 07:00  --------------------------------------------------------  IN: 1125 mL / OUT: 2200 mL / NET: -1075 mL        Physical Exam:  Appearance: No acute distress; well appearing  Eyes: PERRL, EOMI, pink conjunctiva  HENT: Normal oral mucosa  Cardiovascular: RRR, S1, S2, no murmurs, rubs, or gallops; no edema; no JVD  Respiratory: Clear to auscultation bilaterally  Gastrointestinal: soft, non-tender, non-distended with normal bowel sounds  Musculoskeletal: No clubbing; no joint deformity   Neurologic: Non-focal  Lymphatic: No lymphadenopathy  Psychiatry: AAOx3, mood & affect appropriate  Skin: No rashes, ecchymoses, or cyanosis                          11.9   24.27 )-----------( 246      ( 20 Aug 2018 06:00 )             35.8     08-21    135  |  96<L>  |  29<H>  ----------------------------<  88  3.9   |  24  |  1.06    Ca    9.3      21 Aug 2018 06:00                    New ECG(s): Personally reviewed    Echo:    Stress Testing:     Cath:    Imaging:    Interpretation of Telemetry: Sinus 80-90 PVCs

## 2018-08-21 NOTE — CONSULT NOTE ADULT - SUBJECTIVE AND OBJECTIVE BOX
Full note to follow HPI: 77 yo man with lumbar disease POD 2 s/p laminectomy and discectomy. Patient c/o persistent hiccuping and mild abdominal distension. He is passign flatus and had two bowel movements today. He has not had nausea or emesis. Surgery consulted to evaluate for whether patient requires nasogastric decompression.     Past Medical History:  Cataracts, bilateral    Disc disease, degenerative, lumbar or lumbosacral  2018  GERD (gastroesophageal reflux disease)    Hard of hearing  b/l hearing aids  Hypercholesterolemia    Hypertension    Obesity    Snoring  MARCOS precautions -- responds affirmatively to STOP BANG questionnaire.     Past Surgical History:  Cataracts, both eyes  have lenses  Mass  excision of laryngeal mass.    Medications (inpatient): atorvastatin 20 milliGRAM(s) Oral at bedtime  cholecalciferol 2000 Unit(s) Oral daily  dextrose 5% + sodium chloride 0.45% with potassium chloride 20 mEq/L 1000 milliLiter(s) IV Continuous <Continuous>  docusate sodium 100 milliGRAM(s) Oral three times a day  pantoprazole    Tablet 40 milliGRAM(s) Oral before breakfast  polyethylene glycol 3350 17 Gram(s) Oral daily    Medications (PRN):acetaminophen   Tablet 650 milliGRAM(s) Oral every 6 hours PRN  acetaminophen   Tablet. 650 milliGRAM(s) Oral every 6 hours PRN  diazepam    Tablet 5 milliGRAM(s) Oral every 8 hours PRN  guaiFENesin    Syrup 100 milliGRAM(s) Oral every 6 hours PRN  magnesium hydroxide Suspension 30 milliLiter(s) Oral every 12 hours PRN  traMADol 25 milliGRAM(s) Oral every 6 hours PRN  traMADol 50 milliGRAM(s) Oral every 6 hours PRN    Allergies: sulfa drugs (Other)  (Intolerances: )  Social Hx:     Physical Exam  T(C): 36.9 (08-22-18 @ 06:26)  HR: 76 (08-22-18 @ 06:26) (76 - 96)  BP: 148/62 (08-22-18 @ 06:26) (124/62 - 154/85)  RR: 18 (08-22-18 @ 06:26) (18 - 20)  SpO2: 96% (08-22-18 @ 06:26) (94% - 100%)  Tmax: T(C): , Max: 37.2 (08-21-18 @ 08:55)    08-21-18  -  08-22-18  --------------------------------------------------------  IN:    dextrose 5% + sodium chloride 0.45% with potassium chloride 20 mEq/L: 1625 mL    lactated ringers.: 750 mL  Total IN: 2375 mL    OUT:    Ureteral Catheter: 1880 mL  Total OUT: 1880 mL    Total NET: 495 mL        General: well developed, well nourished, NAD  Neuro: alert and oriented, no focal deficits, moves all extremities spontaneously  HEENT: NCAT, EOMI, anicteric, mucosa moist  Respiratory: airway patent, respirations unlabored  CVS: regular rate and rhythm  Abdomen: soft, nontender, mildly distended    Labs:                        11.7   13.74 )-----------( 188      ( 22 Aug 2018 06:00 )             36.5       08-22    140  |  97<L>  |  22  ----------------------------<  114<H>  3.7   |  25  |  1.00    Ca    8.4      22 Aug 2018 06:00    Imaging and other studies:  < from: Xray Abdomen 1 View PORTABLE -Urgent (08.21.18 @ 16:33) >    EXAM:  XR ABDOMEN PORTABLE URGENT 1V        PROCEDURE DATE:  Aug 21 2018         INTERPRETATION:  CLINICAL INDICATION: Severe back pain since 8/16/2018.   Evaluate for ileus improvement.    TECHNIQUE: Two views of the abdomen.    COMPARISON: Abdominal radiographs 8/20/2018.    FINDINGS:  Gas-distended loops of large and small bowel compatible with ileus, not   significantly changed compared to prior exam. There is no evidence of   free intraperitoneal air. No pathologic calcifications are identified.   Degenerative changes of the spine are noted.    IMPRESSION:  Ileus, similar prior exam.    FINESSE DOMÍNGUEZ M.D., RADIOLOGY RESIDENT  This document has been electronically signed.  MARIA ISABEL CRAWFORD M.D., ATTENDING RADIOLOGIST  This document has been electronically signed. Aug 22 2018  5:28AM          < end of copied text >

## 2018-08-21 NOTE — PROGRESS NOTE ADULT - PROBLEM SELECTOR PLAN 3
Likely induced by dexamethasone administration on 8/18. However, WBC in ~24K and patient complaining of abdominal pain w/ no BMs. Patient has remained afebrile for past 24 hrs. Last spiked fever 101.3 on 8/18 am. CXR from 8/18 notable for R lung atelectasis but no infection. Prelim read of 8/19 CTA chest negative for acute PE.  Personally reviewed CTA chest and saw dilated loops of small bowel  - obtain AP and lateral of abdominal x-ray to evaluate for possible ileus vs obstruction  - Continue to encourage Incentive Spirometer   - trend CBC Likely induced by dexamethasone administration on 8/18. However, WBC in ~24K and patient complaining of abdominal pain w/ no BMs. Patient has remained afebrile for past 24 hrs. Last spiked fever 101.3 on 8/18 am. CXR from 8/18 notable for R lung atelectasis but no infection. Prelim read of 8/19 CTA chest negative for acute PE.   - Continue to encourage Incentive Spirometer   - trend CBC

## 2018-08-21 NOTE — PROGRESS NOTE ADULT - PROBLEM SELECTOR PLAN 5
Resolved. Likely multifactorial due to post op volume depletion, antihypertensive meds, opiods/valium for pain control and constipation  -continue to hold antihypertesnsive meds. Both are daily dosing so this can always be restarted when BPs are not acceptable.   -Monitoring H/H for acute blood loss anemia. However, has been overall stable  Slow changes with positional changes   - awaiting TTE  - Can try compression stocking to assist w/ venous return with positional changes   Pain meds may be contributing but he needs pain control   Optimize constipation regimen Resolved. Likely multifactorial due to post op volume depletion, antihypertensive meds, opiods/valium for pain control and constipation  -continue to hold antihypertesnsive meds.   -Slow changes with positional changes   - awaiting TTE  -avoid narcotics if possible  - Can try compression stocking to assist w/ venous return with positional changes

## 2018-08-21 NOTE — PROGRESS NOTE ADULT - PROBLEM SELECTOR PLAN 1
Confirmed by abdominal X-rays  - NPO for now  - supportive care as per ortho team Confirmed by abdominal X-rays  - NPO for now  - supportive care as per ortho team  - agree with gen surg consult

## 2018-08-22 LAB
BUN SERPL-MCNC: 22 MG/DL — SIGNIFICANT CHANGE UP (ref 7–23)
CALCIUM SERPL-MCNC: 8.4 MG/DL — SIGNIFICANT CHANGE UP (ref 8.4–10.5)
CHLORIDE SERPL-SCNC: 97 MMOL/L — LOW (ref 98–107)
CO2 SERPL-SCNC: 25 MMOL/L — SIGNIFICANT CHANGE UP (ref 22–31)
CREAT SERPL-MCNC: 1 MG/DL — SIGNIFICANT CHANGE UP (ref 0.5–1.3)
GLUCOSE SERPL-MCNC: 114 MG/DL — HIGH (ref 70–99)
HCT VFR BLD CALC: 36.5 % — LOW (ref 39–50)
HGB BLD-MCNC: 11.7 G/DL — LOW (ref 13–17)
MCHC RBC-ENTMCNC: 31.5 PG — SIGNIFICANT CHANGE UP (ref 27–34)
MCHC RBC-ENTMCNC: 32.1 % — SIGNIFICANT CHANGE UP (ref 32–36)
MCV RBC AUTO: 98.4 FL — SIGNIFICANT CHANGE UP (ref 80–100)
NRBC # FLD: 0 — SIGNIFICANT CHANGE UP
PLATELET # BLD AUTO: 188 K/UL — SIGNIFICANT CHANGE UP (ref 150–400)
PMV BLD: 10.3 FL — SIGNIFICANT CHANGE UP (ref 7–13)
POTASSIUM SERPL-MCNC: 3.7 MMOL/L — SIGNIFICANT CHANGE UP (ref 3.5–5.3)
POTASSIUM SERPL-SCNC: 3.7 MMOL/L — SIGNIFICANT CHANGE UP (ref 3.5–5.3)
RBC # BLD: 3.71 M/UL — LOW (ref 4.2–5.8)
RBC # FLD: 13.9 % — SIGNIFICANT CHANGE UP (ref 10.3–14.5)
SODIUM SERPL-SCNC: 140 MMOL/L — SIGNIFICANT CHANGE UP (ref 135–145)
WBC # BLD: 13.74 K/UL — HIGH (ref 3.8–10.5)
WBC # FLD AUTO: 13.74 K/UL — HIGH (ref 3.8–10.5)

## 2018-08-22 PROCEDURE — 99233 SBSQ HOSP IP/OBS HIGH 50: CPT

## 2018-08-22 RX ORDER — TAMSULOSIN HYDROCHLORIDE 0.4 MG/1
0.4 CAPSULE ORAL AT BEDTIME
Qty: 0 | Refills: 0 | Status: DISCONTINUED | OUTPATIENT
Start: 2018-08-22 | End: 2018-08-26

## 2018-08-22 RX ORDER — SIMETHICONE 80 MG/1
80 TABLET, CHEWABLE ORAL DAILY
Qty: 0 | Refills: 0 | Status: DISCONTINUED | OUTPATIENT
Start: 2018-08-22 | End: 2018-08-26

## 2018-08-22 RX ADMIN — ATORVASTATIN CALCIUM 20 MILLIGRAM(S): 80 TABLET, FILM COATED ORAL at 21:17

## 2018-08-22 RX ADMIN — TAMSULOSIN HYDROCHLORIDE 0.4 MILLIGRAM(S): 0.4 CAPSULE ORAL at 21:17

## 2018-08-22 RX ADMIN — PANTOPRAZOLE SODIUM 40 MILLIGRAM(S): 20 TABLET, DELAYED RELEASE ORAL at 06:10

## 2018-08-22 RX ADMIN — Medication 100 MILLIGRAM(S): at 11:34

## 2018-08-22 RX ADMIN — DEXTROSE MONOHYDRATE, SODIUM CHLORIDE, AND POTASSIUM CHLORIDE 125 MILLILITER(S): 50; .745; 4.5 INJECTION, SOLUTION INTRAVENOUS at 08:11

## 2018-08-22 RX ADMIN — SIMETHICONE 80 MILLIGRAM(S): 80 TABLET, CHEWABLE ORAL at 11:34

## 2018-08-22 RX ADMIN — POLYETHYLENE GLYCOL 3350 17 GRAM(S): 17 POWDER, FOR SOLUTION ORAL at 11:37

## 2018-08-22 RX ADMIN — Medication 100 MILLIGRAM(S): at 21:17

## 2018-08-22 RX ADMIN — Medication 2000 UNIT(S): at 11:35

## 2018-08-22 RX ADMIN — Medication 100 MILLIGRAM(S): at 06:10

## 2018-08-22 RX ADMIN — TRAMADOL HYDROCHLORIDE 25 MILLIGRAM(S): 50 TABLET ORAL at 09:05

## 2018-08-22 RX ADMIN — TRAMADOL HYDROCHLORIDE 25 MILLIGRAM(S): 50 TABLET ORAL at 08:12

## 2018-08-22 NOTE — PROGRESS NOTE ADULT - ASSESSMENT
79 y/o male w/ Hx [place], Obesity, HTN, hyperlipidemia,  who presents with progressively worsening symptomatolgy of lumbar disc disease. Xrays and MR confirm pathology. S/p L2-5 posterior lumbar laminectomy on 8-16-18. Hospital course c/b post-op ileus and urinary retention requiring Frederick catheter.

## 2018-08-22 NOTE — PROGRESS NOTE ADULT - PROBLEM SELECTOR PLAN 1
Confirmed by abdominal X-rays. Seems to be improving. Will continue to monitor  - advance diet as tolerated - pt currently on clear liquid diet  - encouraged pt to increase mobility, try to walk if possible or OOB to chair  - supportive care as per ortho team

## 2018-08-22 NOTE — CHART NOTE - NSCHARTNOTEFT_GEN_A_CORE
Echo reviewed and discussed with pt. Normal LV and RV function. Orthostatic hypotension has resolved. No further cardiac w/u needed. Please call w questions.

## 2018-08-22 NOTE — PROGRESS NOTE ADULT - SUBJECTIVE AND OBJECTIVE BOX
Patient is doing well. Pain well controlled with pain medications. Frederick reinserted o/n due to urinary retention, reports having 4-5 bowel movements yesterday initially loose by increasingly solid.     Exam:  Gen: NAD  Motor: 5/5 EHL/FHL/TA/Gastrocnemius  Sensory: SILT DP/SP/S/S/T nerve distributions  Dressing: Clean, Dry, Intact, HV 5/60, removed on rounds    Vital Signs Last 24 Hrs  T(C): 36.8 (21 Aug 2018 20:14), Max: 37.2 (21 Aug 2018 08:55)  T(F): 98.3 (21 Aug 2018 20:14), Max: 98.9 (21 Aug 2018 08:55)  HR: 96 (21 Aug 2018 20:14) (90 - 96)  BP: 135/61 (21 Aug 2018 17:35) (124/62 - 154/85)  BP(mean): --  RR: 20 (21 Aug 2018 20:14) (18 - 20)  SpO2: 95% (21 Aug 2018 20:14) (94% - 100%)                           A/P: 78 year old male s/p L2-5 laminectomy, L3-4 Discectomy now with urinary retention and resolving post operative ileus. Neurologic exam stable.   - Pain Control  - Regular Diet  - PT/OT, OOB  - WBAT  - dispo planning

## 2018-08-22 NOTE — PROGRESS NOTE ADULT - PROBLEM SELECTOR PLAN 3
Induced by dexamethasone administration on 8/18. Now improved. Pt afebrile with no signs of acute infection at this time.   - no further w/u at this time  - trend CBC

## 2018-08-22 NOTE — PROGRESS NOTE ADULT - PROBLEM SELECTOR PLAN 2
Pt with 1300mL of urine removed after straight cath on, 8/20. Failed TOV. Now with Frederick. Retention likely due to underlying ileus.  - maintain Frederick for now until good BM and/or pt more mobile

## 2018-08-22 NOTE — PROGRESS NOTE ADULT - PROBLEM SELECTOR PLAN 5
Resolved. Likely multifactorial due to post op volume depletion, antihypertensive meds, opioids/valium for pain control and constipation. TTE normal  -continue to hold antihypertensive meds. Will resume if pt has persistently elevated BP  -avoid narcotics if possible  - no need for cardiac monitoring with telemetry

## 2018-08-22 NOTE — PROGRESS NOTE ADULT - PROBLEM SELECTOR PLAN 4
S/p L2-5 posterior lumbar laminectomy on 8-16-18  - management as per ortho  - would avoid opioids as much as possible given pt's ileus

## 2018-08-22 NOTE — PROGRESS NOTE ADULT - SUBJECTIVE AND OBJECTIVE BOX
CHIEF COMPLAINT: f/u     SUBJECTIVE / OVERNIGHT EVENTS: Patient seen and examined. No acute events overnight. Pain well controlled and patient without any complaints.    MEDICATIONS  (STANDING):  atorvastatin 20 milliGRAM(s) Oral at bedtime  cholecalciferol 2000 Unit(s) Oral daily  dextrose 5% + sodium chloride 0.45% with potassium chloride 20 mEq/L 1000 milliLiter(s) (125 mL/Hr) IV Continuous <Continuous>  docusate sodium 100 milliGRAM(s) Oral three times a day  pantoprazole    Tablet 40 milliGRAM(s) Oral before breakfast  polyethylene glycol 3350 17 Gram(s) Oral daily    MEDICATIONS  (PRN):  acetaminophen   Tablet 650 milliGRAM(s) Oral every 6 hours PRN For Temp greater than 38 C (100.4 F)  acetaminophen   Tablet. 650 milliGRAM(s) Oral every 6 hours PRN Mild Pain (1 - 3)  diazepam    Tablet 5 milliGRAM(s) Oral every 8 hours PRN Back Spasms  guaiFENesin    Syrup 100 milliGRAM(s) Oral every 6 hours PRN Cough  magnesium hydroxide Suspension 30 milliLiter(s) Oral every 12 hours PRN Constipation  traMADol 25 milliGRAM(s) Oral every 6 hours PRN Moderate Pain (4 - 6)  traMADol 50 milliGRAM(s) Oral every 6 hours PRN Severe Pain (7 - 10)      VITALS:  T(F): 98.4 (08-22-18 @ 06:26), Max: 98.5 (08-21-18 @ 17:35)  HR: 76 (08-22-18 @ 06:26) (76 - 96)  BP: 148/62 (08-22-18 @ 06:26) (124/62 - 148/62)  RR: 18 (08-22-18 @ 06:26) (18 - 20)  SpO2: 96% (08-22-18 @ 06:26)  Wt(kg): --      CAPILLARY BLOOD GLUCOSE    PHYSICAL EXAM:  GENERAL: NAD, well-developed  HEAD:  Atraumatic, Normocephalic  EYES: EOMI, PERRLA, conjunctiva and sclera clear  NECK: Supple, No JVD  CHEST/LUNG: Clear to auscultation bilaterally; No wheeze  HEART: Regular rate and rhythm; No murmurs, rubs, or gallops  ABDOMEN: Soft, Nontender, Nondistended; Bowel sounds present  EXTREMITIES:  2+ Peripheral Pulses, No clubbing, cyanosis, or edema  PSYCH: AAOx3  NEUROLOGY: non-focal  SKIN: No rashes or lesions    LABS:              11.7                 140  | 25   | 22           13.74 >-----------< 188     ------------------------< 114                   36.5                 3.7  | 97   | 1.00                                         Ca 8.4   Mg x     Ph x                      RADIOLOGY & ADDITIONAL TESTS:  Imaging Personally Reviewed: [x] Yes    [ ] Consultant(s) Notes Reviewed:  [x] Care Discussed with Consultants/Other Providers: Orthopedic PA - discussed CHIEF COMPLAINT: f/u     SUBJECTIVE / OVERNIGHT EVENTS:   Patient seen and examined. No acute events overnight. Patient with abdominal discomfort, but tolerating clear liquids. Patient reports having a small BM yesterday. No nausea/vomiting. He states he was able to sit in a chair for about 1 hour yesterday. Patient not as sleepy today.    MEDICATIONS  (STANDING):  atorvastatin 20 milliGRAM(s) Oral at bedtime  cholecalciferol 2000 Unit(s) Oral daily  dextrose 5% + sodium chloride 0.45% with potassium chloride 20 mEq/L 1000 milliLiter(s) (125 mL/Hr) IV Continuous <Continuous>  docusate sodium 100 milliGRAM(s) Oral three times a day  pantoprazole    Tablet 40 milliGRAM(s) Oral before breakfast  polyethylene glycol 3350 17 Gram(s) Oral daily    MEDICATIONS  (PRN):  acetaminophen   Tablet 650 milliGRAM(s) Oral every 6 hours PRN For Temp greater than 38 C (100.4 F)  acetaminophen   Tablet. 650 milliGRAM(s) Oral every 6 hours PRN Mild Pain (1 - 3)  diazepam    Tablet 5 milliGRAM(s) Oral every 8 hours PRN Back Spasms  guaiFENesin    Syrup 100 milliGRAM(s) Oral every 6 hours PRN Cough  magnesium hydroxide Suspension 30 milliLiter(s) Oral every 12 hours PRN Constipation  traMADol 25 milliGRAM(s) Oral every 6 hours PRN Moderate Pain (4 - 6)  traMADol 50 milliGRAM(s) Oral every 6 hours PRN Severe Pain (7 - 10)      VITALS:  T(F): 98.4 (08-22-18 @ 06:26), Max: 98.5 (08-21-18 @ 17:35)  HR: 76 (08-22-18 @ 06:26) (76 - 96)  BP: 148/62 (08-22-18 @ 06:26) (124/62 - 148/62)  RR: 18 (08-22-18 @ 06:26) (18 - 20)  SpO2: 96% (08-22-18 @ 06:26)        PHYSICAL EXAM:  GENERAL: NAD, obese  EYES: EOMI, normal conjunctiva and sclera clear  CHEST/LUNG: Clear to auscultation bilaterally; No wheeze  HEART: Regular rate and rhythm; No murmurs, rubs, or gallops  ABDOMEN: Soft, nontender, distended and tympanic; Few bowel sounds, but present  EXTREMITIES:  2+ Peripheral Pulses, No clubbing, cyanosis, or edema  : Frederick in place, draining clear yellow urine  PSYCH: AAOx3  NEUROLOGY: non-focal        LABS:              11.7                 140  | 25   | 22           13.74 >-----------< 188     ------------------------< 114                   36.5                 3.7  | 97   | 1.00                                         Ca 8.4   Mg x     Ph x                [ ] Consultant(s) Notes Reviewed:  [x] Care Discussed with Consultants/Other Providers: Orthopedic PA - discussed

## 2018-08-22 NOTE — PROGRESS NOTE ADULT - PROBLEM SELECTOR PLAN 6
BP meds held in the setting of orthostatic hypotension. BP within acceptable range while off antihypertensive meds.  -continue to monitor. If BP elevated persistently elevated will resume home BP meds

## 2018-08-23 PROCEDURE — 99233 SBSQ HOSP IP/OBS HIGH 50: CPT

## 2018-08-23 RX ORDER — ACETAMINOPHEN 500 MG
1000 TABLET ORAL EVERY 8 HOURS
Qty: 0 | Refills: 0 | Status: COMPLETED | OUTPATIENT
Start: 2018-08-23 | End: 2018-08-24

## 2018-08-23 RX ORDER — SODIUM CHLORIDE 9 MG/ML
1000 INJECTION, SOLUTION INTRAVENOUS
Qty: 0 | Refills: 0 | Status: DISCONTINUED | OUTPATIENT
Start: 2018-08-23 | End: 2018-08-25

## 2018-08-23 RX ADMIN — MAGNESIUM HYDROXIDE 30 MILLILITER(S): 400 TABLET, CHEWABLE ORAL at 07:44

## 2018-08-23 RX ADMIN — Medication 400 MILLIGRAM(S): at 13:00

## 2018-08-23 RX ADMIN — Medication 400 MILLIGRAM(S): at 21:31

## 2018-08-23 RX ADMIN — Medication 2000 UNIT(S): at 11:44

## 2018-08-23 RX ADMIN — SODIUM CHLORIDE 100 MILLILITER(S): 9 INJECTION, SOLUTION INTRAVENOUS at 07:37

## 2018-08-23 RX ADMIN — POLYETHYLENE GLYCOL 3350 17 GRAM(S): 17 POWDER, FOR SOLUTION ORAL at 11:44

## 2018-08-23 RX ADMIN — Medication 100 MILLIGRAM(S): at 05:24

## 2018-08-23 RX ADMIN — Medication 1000 MILLIGRAM(S): at 22:00

## 2018-08-23 RX ADMIN — PANTOPRAZOLE SODIUM 40 MILLIGRAM(S): 20 TABLET, DELAYED RELEASE ORAL at 05:24

## 2018-08-23 RX ADMIN — Medication 100 MILLIGRAM(S): at 11:44

## 2018-08-23 RX ADMIN — Medication 1000 MILLIGRAM(S): at 13:20

## 2018-08-23 RX ADMIN — ATORVASTATIN CALCIUM 20 MILLIGRAM(S): 80 TABLET, FILM COATED ORAL at 21:32

## 2018-08-23 RX ADMIN — TAMSULOSIN HYDROCHLORIDE 0.4 MILLIGRAM(S): 0.4 CAPSULE ORAL at 21:32

## 2018-08-23 RX ADMIN — Medication 100 MILLIGRAM(S): at 21:32

## 2018-08-23 RX ADMIN — SIMETHICONE 80 MILLIGRAM(S): 80 TABLET, CHEWABLE ORAL at 11:44

## 2018-08-23 NOTE — CONSULT NOTE ADULT - ASSESSMENT
77 y/o male w/ Hx [place], Obesity, HTN, hyperlipidemia,  who presents with progressively worsening symptomatolgy of lumbar disc disease. Xrays and MR confirm pathology. S/p L2-5 posterior lumbar laminectomy on 8-16-18.  Now with orthostatic hypotension  RECOMMENDATIONS: patient 12-lead EKG is normal sinus rhythm, he is not on tele. Would recommend rule out PE on this patient. He is on pneumatic compression for VTE prophylaxis, however, he has elevated temp, fever, with elevated WBC. He has no tachycardia and is not desaturating. Would further recommend transfer to tele and perform orthostatic BP monitoring, both side in flat, sit, stand positions to further evaluate BP. He is getting IVF and electrolytes are in balance. Continue to replete electrolytes and replace fluid losses with IVF. Patient had previous ECHO and carotid dopplers within the last year by Dr.Fred Escamilla, wife reports no known severe abnormalities. Would recommend ECHO. Plan discussed with .
78 M POD 7 from posterior approach laminectomy, discectomy with postop ileus. No longer has hiccups, distension much improved, passing gas and stool   - Magnesium laxative appears to have helped  - OOB/amb  - no need for gastric decompression at this time  - no imaging necessary
79 yo man s/p laminectomy, discectomy with resolving post operative ileus    - OOB  - Regular diet  - Replete Mag, Phos, K goal of 2, 3, 4  - No NGT or other general surgery intervention    Discussed above with Dr. Jose DELGADO PGY3  [id number]
79 y/o male w/ Hx [place], Obesity, HTN, hyperlipidemia,  who presents with progressively worsening symptomatolgy of lumbar disc disease. Xrays and MR confirm pathology. Scheduled for L2-5 posterior lumbar laminectomy on 8-16-18.

## 2018-08-23 NOTE — PROGRESS NOTE ADULT - SUBJECTIVE AND OBJECTIVE BOX
Patient is seen and examined. Denies CP/SOB/Dizziness/N/V/D/MISTRY. States has some gas pain. Not passing gas.     Vital Signs Last 24 Hrs  T(C): 36.6 (23 Aug 2018 05:21), Max: 36.9 (22 Aug 2018 17:24)  T(F): 97.9 (23 Aug 2018 05:21), Max: 98.4 (22 Aug 2018 17:24)  HR: 79 (23 Aug 2018 05:21) (75 - 82)  BP: 149/63 (23 Aug 2018 05:21) (140/55 - 151/73)  BP(mean): --  RR: 18 (23 Aug 2018 05:21) (18 - 19)  SpO2: 98% (23 Aug 2018 05:21) (95% - 98%)    Gen: NAD    BACK:  Dressing C/D/I.   Compartments are soft, extremities are warm. Motor intact 5/5 EHL/FHL/TA/GS. Sensation is grossly intact in the BL LE. 1+DP BL LE.   Abdomen: Distended with tenderness to palpation.  Labs:                           11.7   13.74 )-----------( 188      ( 22 Aug 2018 06:00 )             36.5     08-22    140  |  97<L>  |  22  ----------------------------<  114<H>  3.7   |  25  |  1.00    Ca    8.4      22 Aug 2018 06:00        A/P: Patient is a 77 y/o Male s/p L2-L5 Lami, L3-L4 Disc, POD# 7    -Pain control/Analgesia  -Inc Spirometry  -Venodynes  -F/U AM Labs  -PT/OT  -NPO  -WBAT  -Monitor Drain output  -Notify Ortho with any questions

## 2018-08-23 NOTE — PROGRESS NOTE ADULT - PROBLEM SELECTOR PLAN 1
Confirmed by abdominal X-rays. Continues to improve. Pt now with liquid stools for BM. Will continue to monitor  - advance diet as tolerated - continue clear liquid diet  - encouraged pt to increase mobility, try to walk if possible or OOB to chair  - supportive care as per ortho team

## 2018-08-23 NOTE — PROGRESS NOTE ADULT - PROBLEM SELECTOR PLAN 4
Resolved. Likely multifactorial due to post op volume depletion, antihypertensive meds, opioids/valium for pain control and constipation. TTE normal  -continue to hold antihypertensive meds for now. If BP remains stable, will consider restarting home regimen of losartan 50mg daily tomorrow and continue to hold HCTZ.   -avoid narcotics if possible  - no need for cardiac monitoring with telemetry

## 2018-08-23 NOTE — PROGRESS NOTE ADULT - PROBLEM SELECTOR PLAN 5
BP meds held in the setting of orthostatic hypotension. BP within acceptable range while off antihypertensive meds.  -continue to monitor. If BP remains stable, will consider restarting home regimen of losartan 50mg daily tomorrow and continue to hold HCTZ.

## 2018-08-23 NOTE — PROGRESS NOTE ADULT - SUBJECTIVE AND OBJECTIVE BOX
CHIEF COMPLAINT: f/u     SUBJECTIVE / OVERNIGHT EVENTS: Patient seen and examined. No acute events overnight. Pain well controlled and patient without any complaints.    MEDICATIONS  (STANDING):  atorvastatin 20 milliGRAM(s) Oral at bedtime  cholecalciferol 2000 Unit(s) Oral daily  dextrose 5% + sodium chloride 0.45%. 1000 milliLiter(s) (100 mL/Hr) IV Continuous <Continuous>  docusate sodium 100 milliGRAM(s) Oral three times a day  pantoprazole    Tablet 40 milliGRAM(s) Oral before breakfast  polyethylene glycol 3350 17 Gram(s) Oral daily  simethicone 80 milliGRAM(s) Chew daily  tamsulosin 0.4 milliGRAM(s) Oral at bedtime    MEDICATIONS  (PRN):  acetaminophen   Tablet 650 milliGRAM(s) Oral every 6 hours PRN For Temp greater than 38 C (100.4 F)  acetaminophen   Tablet. 650 milliGRAM(s) Oral every 6 hours PRN Mild Pain (1 - 3)  diazepam    Tablet 5 milliGRAM(s) Oral every 8 hours PRN Back Spasms  guaiFENesin    Syrup 100 milliGRAM(s) Oral every 6 hours PRN Cough  magnesium hydroxide Suspension 30 milliLiter(s) Oral every 12 hours PRN Constipation  traMADol 25 milliGRAM(s) Oral every 6 hours PRN Moderate Pain (4 - 6)  traMADol 50 milliGRAM(s) Oral every 6 hours PRN Severe Pain (7 - 10)      VITALS:  T(F): 97.9 (08-23-18 @ 05:21), Max: 98.4 (08-22-18 @ 17:24)  HR: 79 (08-23-18 @ 05:21) (75 - 82)  BP: 149/63 (08-23-18 @ 05:21) (140/55 - 151/73)  RR: 18 (08-23-18 @ 05:21) (18 - 19)  SpO2: 98% (08-23-18 @ 05:21)  Wt(kg): --      CAPILLARY BLOOD GLUCOSE    PHYSICAL EXAM:  GENERAL: NAD, well-developed  HEAD:  Atraumatic, Normocephalic  EYES: EOMI, PERRLA, conjunctiva and sclera clear  NECK: Supple, No JVD  CHEST/LUNG: Clear to auscultation bilaterally; No wheeze  HEART: Regular rate and rhythm; No murmurs, rubs, or gallops  ABDOMEN: Soft, Nontender, Nondistended; Bowel sounds present  EXTREMITIES:  2+ Peripheral Pulses, No clubbing, cyanosis, or edema  PSYCH: AAOx3  NEUROLOGY: non-focal  SKIN: No rashes or lesions    LABS:              11.7                 140  | 25   | 22           13.74 >-----------< 188     ------------------------< 114                   36.5                 3.7  | 97   | 1.00                                         Ca 8.4   Mg x     Ph x                      RADIOLOGY & ADDITIONAL TESTS:  Imaging Personally Reviewed: [x] Yes    [ ] Consultant(s) Notes Reviewed:  [x] Care Discussed with Consultants/Other Providers: Orthopedic PA - discussed CHIEF COMPLAINT: f/u     SUBJECTIVE / OVERNIGHT EVENTS:   Patient seen and examined this morning. Patient's son present at bedside. Patient reports have 2 episode of liquidy stools. His abdominal pain is better than before but still with poor appetite. Patient was able to walk around with PT yesterday. Patient comments that he feels like his "vision is closing in." He denies blurry or double vision. Patient comments that similar visual sensation has occurred before even prior to current admission. Patient reports he thinks he gets that visual symptom when he is week. Of note, patient's son comments that patient is drowsy today compared to yesterday.     MEDICATIONS  (STANDING):  atorvastatin 20 milliGRAM(s) Oral at bedtime  cholecalciferol 2000 Unit(s) Oral daily  dextrose 5% + sodium chloride 0.45%. 1000 milliLiter(s) (100 mL/Hr) IV Continuous <Continuous>  docusate sodium 100 milliGRAM(s) Oral three times a day  pantoprazole    Tablet 40 milliGRAM(s) Oral before breakfast  polyethylene glycol 3350 17 Gram(s) Oral daily  simethicone 80 milliGRAM(s) Chew daily  tamsulosin 0.4 milliGRAM(s) Oral at bedtime    MEDICATIONS  (PRN):  acetaminophen   Tablet 650 milliGRAM(s) Oral every 6 hours PRN For Temp greater than 38 C (100.4 F)  acetaminophen   Tablet. 650 milliGRAM(s) Oral every 6 hours PRN Mild Pain (1 - 3)  diazepam    Tablet 5 milliGRAM(s) Oral every 8 hours PRN Back Spasms  guaiFENesin    Syrup 100 milliGRAM(s) Oral every 6 hours PRN Cough  magnesium hydroxide Suspension 30 milliLiter(s) Oral every 12 hours PRN Constipation  traMADol 25 milliGRAM(s) Oral every 6 hours PRN Moderate Pain (4 - 6)  traMADol 50 milliGRAM(s) Oral every 6 hours PRN Severe Pain (7 - 10)      VITALS:  T(F): 97.9 (08-23-18 @ 05:21), Max: 98.4 (08-22-18 @ 17:24)  HR: 79 (08-23-18 @ 05:21) (75 - 82)  BP: 149/63 (08-23-18 @ 05:21) (140/55 - 151/73)  RR: 18 (08-23-18 @ 05:21) (18 - 19)  SpO2: 98% (08-23-18 @ 05:21)      PHYSICAL EXAM:  GENERAL: Obese man, sitting in chair in NAD, appears drowsy  EYES: EOMI, normal conjunctiva and sclera clear  CHEST/LUNG: Clear to auscultation bilaterally; No wheeze  HEART: Regular rate and rhythm; No murmurs, rubs, or gallops  ABDOMEN: Soft, nontender, distended (improved compared to prior exams); +bowel sounds, more compared to prior exams  EXTREMITIES:  2+ Peripheral Pulses, No clubbing, cyanosis, or edema  : Frederick in place, draining clear yellow urine  PSYCH: AAOx3  NEUROLOGY: non-focal      LABS:              11.7                 140  | 25   | 22           13.74 >-----------< 188     ------------------------< 114                   36.5                 3.7  | 97   | 1.00                                         Ca 8.4   Mg x     Ph x          [ ] Consultant(s) Notes Reviewed:  [x] Care Discussed with Consultants/Other Providers: Orthopedic PA - discussed

## 2018-08-23 NOTE — PROGRESS NOTE ADULT - PROBLEM SELECTOR PLAN 2
Pt with 1300mL of urine removed after straight cath on, 8/20. Failed TOV. Now with Frederick. Retention likely due to underlying ileus.  - remove Frederick with TOV  - if patient passes trial of void, can likely d/c tamsulosin which was initiated during hospitalization

## 2018-08-23 NOTE — PROGRESS NOTE ADULT - PROBLEM SELECTOR PLAN 3
S/p L2-5 posterior lumbar laminectomy on 8-16-18  - management as per ortho  - would avoid opioids as much as possible given pt's ileus.   - d/c tramadol given ongoing drowsiness. Recommend IV vs. oral acetaminophen for further pain control

## 2018-08-23 NOTE — CONSULT NOTE ADULT - SUBJECTIVE AND OBJECTIVE BOX
SUBJECTIVE: No nausea/ Vomiting. has had hiccups in the past few days but now resolved. had 2 large liquid bowel movements that provided relief for previous distension.     Vital Signs Last 24 Hrs  T(C): 36.6 (23 Aug 2018 05:21), Max: 36.9 (22 Aug 2018 17:24)  T(F): 97.9 (23 Aug 2018 05:21), Max: 98.4 (22 Aug 2018 17:24)  HR: 79 (23 Aug 2018 05:21) (75 - 82)  BP: 149/63 (23 Aug 2018 05:21) (140/55 - 151/73)  BP(mean): --  RR: 18 (23 Aug 2018 05:21) (18 - 19)  SpO2: 98% (23 Aug 2018 05:21) (95% - 98%)    I&O's Detail    22 Aug 2018 07:01  -  23 Aug 2018 07:00  --------------------------------------------------------  IN:    Oral Fluid: 480 mL  Total IN: 480 mL    OUT:    Ureteral Catheter: 1800 mL  Total OUT: 1800 mL    Total NET: -1320 mL      23 Aug 2018 07:01  -  23 Aug 2018 10:48  --------------------------------------------------------  IN:  Total IN: 0 mL    OUT:    Ureteral Catheter: 200 mL  Total OUT: 200 mL    Total NET: -200 mL          Physical Exam:  General Appearance: Appears well, NAD  soft, minimal distension, mild TTP right hemiabdomen    LABS:                        11.7   13.74 )-----------( 188      ( 22 Aug 2018 06:00 )             36.5     08-22    140  |  97<L>  |  22  ----------------------------<  114<H>  3.7   |  25  |  1.00    Ca    8.4      22 Aug 2018 06:00            RADIOLOGY & ADDITIONAL STUDIES:

## 2018-08-24 PROCEDURE — 99233 SBSQ HOSP IP/OBS HIGH 50: CPT

## 2018-08-24 RX ADMIN — Medication 400 MILLIGRAM(S): at 05:23

## 2018-08-24 RX ADMIN — Medication 2000 UNIT(S): at 14:25

## 2018-08-24 RX ADMIN — PANTOPRAZOLE SODIUM 40 MILLIGRAM(S): 20 TABLET, DELAYED RELEASE ORAL at 05:22

## 2018-08-24 RX ADMIN — SIMETHICONE 80 MILLIGRAM(S): 80 TABLET, CHEWABLE ORAL at 14:26

## 2018-08-24 RX ADMIN — Medication 100 MILLIGRAM(S): at 05:22

## 2018-08-24 RX ADMIN — TAMSULOSIN HYDROCHLORIDE 0.4 MILLIGRAM(S): 0.4 CAPSULE ORAL at 21:57

## 2018-08-24 RX ADMIN — Medication 100 MILLIGRAM(S): at 21:57

## 2018-08-24 RX ADMIN — ATORVASTATIN CALCIUM 20 MILLIGRAM(S): 80 TABLET, FILM COATED ORAL at 21:57

## 2018-08-24 RX ADMIN — POLYETHYLENE GLYCOL 3350 17 GRAM(S): 17 POWDER, FOR SOLUTION ORAL at 14:26

## 2018-08-24 RX ADMIN — Medication 100 MILLIGRAM(S): at 14:26

## 2018-08-24 RX ADMIN — Medication 1000 MILLIGRAM(S): at 05:56

## 2018-08-24 NOTE — PROGRESS NOTE ADULT - ASSESSMENT
A/P: Patient is a 78y y/o Male s/p L2-L5 Lami, L3-L4 Disc    -Pain control/Analgesia  -Inc Spirometry  -Venodynes  -Advance to clears today as per gen surgery  -PT/OT  -WBAT  -DTV  - continue to monitor UOP  -Notify Ortho with any questions ([id number])

## 2018-08-24 NOTE — PROGRESS NOTE ADULT - PROBLEM SELECTOR PLAN 2
Pt with 1300mL of urine removed after straight cath on, 8/20. Failed TOV. Now with Frederick. Retention likely due to underlying ileus.  - remove Frederick with TOV  - if patient passes trial of void, can likely d/c tamsulosin which was initiated during hospitalization 73.05 Pt with 1300mL of urine removed after straight cath on, 8/20. Initially failed TOV and required Frederick. Frederick removed and pt now passed TOV. Retention was likely due to underlying ileus.  - can likely d/c tamsulosin which was initiated during hospitalization since pt passed TOV

## 2018-08-24 NOTE — PROGRESS NOTE ADULT - PROBLEM SELECTOR PLAN 7
c/w protonix. c/w protonix.    DISPO: Anticipate discharge likely over the weekend if pt continues to improve and deemed appropriate for discharge by ortho

## 2018-08-24 NOTE — PROGRESS NOTE ADULT - SUBJECTIVE AND OBJECTIVE BOX
CHIEF COMPLAINT: f/u     SUBJECTIVE / OVERNIGHT EVENTS:   Frederick removed yesterday and pt passed TOV.    MEDICATIONS  (STANDING):  atorvastatin 20 milliGRAM(s) Oral at bedtime  cholecalciferol 2000 Unit(s) Oral daily  dextrose 5% + sodium chloride 0.45%. 1000 milliLiter(s) (100 mL/Hr) IV Continuous <Continuous>  docusate sodium 100 milliGRAM(s) Oral three times a day  pantoprazole    Tablet 40 milliGRAM(s) Oral before breakfast  polyethylene glycol 3350 17 Gram(s) Oral daily  simethicone 80 milliGRAM(s) Chew daily  tamsulosin 0.4 milliGRAM(s) Oral at bedtime    MEDICATIONS  (PRN):  acetaminophen   Tablet 650 milliGRAM(s) Oral every 6 hours PRN For Temp greater than 38 C (100.4 F)  diazepam    Tablet 5 milliGRAM(s) Oral every 8 hours PRN Back Spasms  guaiFENesin    Syrup 100 milliGRAM(s) Oral every 6 hours PRN Cough  magnesium hydroxide Suspension 30 milliLiter(s) Oral every 12 hours PRN Constipation      VITALS:  T(F): 97.9 (08-24-18 @ 07:48), Max: 98 (08-23-18 @ 11:57)  HR: 73 (08-24-18 @ 07:48) (69 - 84)  BP: 142/74 (08-24-18 @ 07:48) (125/56 - 151/76)  RR: 18 (08-24-18 @ 07:48) (18 - 18)  SpO2: 100% (08-24-18 @ 07:48)  Wt(kg): --      CAPILLARY BLOOD GLUCOSE    PHYSICAL EXAM:                [ ] Consultant(s) Notes Reviewed:  [x] Care Discussed with Consultants/Other Providers: Orthopedic PA - discussed CHIEF COMPLAINT: f/u     SUBJECTIVE / OVERNIGHT EVENTS:   Frederick removed yesterday and pt passed TOV. Patient seen and examined this afternoon. Pt's wife present at bedside. Patient also had loose BM on the toilet today and able to tolerate jello and clear liquid diet. Patient states he is starting to feel more like himself. He was able to walk down the hallway with PT    MEDICATIONS  (STANDING):  atorvastatin 20 milliGRAM(s) Oral at bedtime  cholecalciferol 2000 Unit(s) Oral daily  dextrose 5% + sodium chloride 0.45%. 1000 milliLiter(s) (100 mL/Hr) IV Continuous <Continuous>  docusate sodium 100 milliGRAM(s) Oral three times a day  pantoprazole    Tablet 40 milliGRAM(s) Oral before breakfast  polyethylene glycol 3350 17 Gram(s) Oral daily  simethicone 80 milliGRAM(s) Chew daily  tamsulosin 0.4 milliGRAM(s) Oral at bedtime    MEDICATIONS  (PRN):  acetaminophen   Tablet 650 milliGRAM(s) Oral every 6 hours PRN For Temp greater than 38 C (100.4 F)  diazepam    Tablet 5 milliGRAM(s) Oral every 8 hours PRN Back Spasms  guaiFENesin    Syrup 100 milliGRAM(s) Oral every 6 hours PRN Cough  magnesium hydroxide Suspension 30 milliLiter(s) Oral every 12 hours PRN Constipation      VITALS:  T(F): 97.9 (08-24-18 @ 07:48), Max: 98 (08-23-18 @ 11:57)  HR: 73 (08-24-18 @ 07:48) (69 - 84)  BP: 142/74 (08-24-18 @ 07:48) (125/56 - 151/76)  RR: 18 (08-24-18 @ 07:48) (18 - 18)  SpO2: 100% (08-24-18 @ 07:48)      PHYSICAL EXAM:  GENERAL: Obese man, awake and conversant  EYES: EOMI, normal conjunctiva and sclera clear  CHEST/LUNG: Clear to auscultation bilaterally; No wheeze  HEART: Regular rate and rhythm; No murmurs, rubs, or gallops  ABDOMEN: Soft, nontender, distended (improved compared to prior exams); +bowel sounds  BACK: Dressing c/d/i  EXTREMITIES:  2+ Peripheral Pulses, No clubbing, cyanosis, or edema  NEUROLOGY: non-focal        [ ] Consultant(s) Notes Reviewed:  [x] Care Discussed with Consultants/Other Providers: Orthopedic PA - discussed

## 2018-08-24 NOTE — PROGRESS NOTE ADULT - SUBJECTIVE AND OBJECTIVE BOX
Orthopedic Surgery Progress Note     S: Patient seen and examined during rounds this morning. No acute events overnight. Voided 175cc this morning. States that he has not passed gas but "hears rumbles." Denies f/c, chest pain, sob.      MEDICATIONS  (STANDING):  atorvastatin 20 milliGRAM(s) Oral at bedtime  cholecalciferol 2000 Unit(s) Oral daily  dextrose 5% + sodium chloride 0.45%. 1000 milliLiter(s) (100 mL/Hr) IV Continuous <Continuous>  docusate sodium 100 milliGRAM(s) Oral three times a day  pantoprazole    Tablet 40 milliGRAM(s) Oral before breakfast  polyethylene glycol 3350 17 Gram(s) Oral daily  simethicone 80 milliGRAM(s) Chew daily  tamsulosin 0.4 milliGRAM(s) Oral at bedtime    MEDICATIONS  (PRN):  acetaminophen   Tablet 650 milliGRAM(s) Oral every 6 hours PRN For Temp greater than 38 C (100.4 F)  diazepam    Tablet 5 milliGRAM(s) Oral every 8 hours PRN Back Spasms  guaiFENesin    Syrup 100 milliGRAM(s) Oral every 6 hours PRN Cough  magnesium hydroxide Suspension 30 milliLiter(s) Oral every 12 hours PRN Constipation      Physical Exam:    Vital Signs Last 24 Hrs  T(C): 36.6 (24 Aug 2018 05:21), Max: 36.7 (23 Aug 2018 11:57)  T(F): 97.8 (24 Aug 2018 05:21), Max: 98 (23 Aug 2018 11:57)  HR: 72 (24 Aug 2018 05:21) (69 - 84)  BP: 151/76 (24 Aug 2018 05:21) (125/56 - 151/76)  BP(mean): --  RR: 18 (24 Aug 2018 05:21) (18 - 18)  SpO2: 100% (24 Aug 2018 05:21) (93% - 100%)    08-22-18 @ 07:01  -  08-23-18 @ 07:00  --------------------------------------------------------  IN: 480 mL / OUT: 1800 mL / NET: -1320 mL    08-23-18 @ 07:01  -  08-24-18 @ 06:25  --------------------------------------------------------  IN: 1200 mL / OUT: 875 mL / NET: 325 mL      Gen: NAD    BACK: Dressing C/D/I.   MSK:  LLE  - 4+/5 strength to TA. 5/5 strength of HF/Q/H/EHL/GS  - SILT intact L2-S1.  - WWP. Brisk capillary refill.  RLE  - 5/5 strength of HF/Q/H/TA/EHL/GS  - SILT intact L2-S1.  - WWP. Brisk capillary refill.       LABS:

## 2018-08-24 NOTE — PROGRESS NOTE ADULT - PROBLEM SELECTOR PLAN 1
Confirmed by abdominal X-rays. Continues to improve. Pt now with liquid stools for BM. Will continue to monitor  - advance diet as tolerated - continue clear liquid diet  - encouraged pt to increase mobility, try to walk if possible or OOB to chair  - supportive care as per ortho team Confirmed by abdominal X-rays. Continues to improve. Pt now with liquid stools for BM. Will continue to monitor  - advance diet as tolerated  - encouraged pt to increase mobility, try to walk if possible or OOB to chair  - supportive care as per ortho team

## 2018-08-24 NOTE — PROGRESS NOTE ADULT - ASSESSMENT
77 y/o male w/ Hx [place], Obesity, HTN, hyperlipidemia presenting with progressively worsening symptoms of lumbar disc disease, confirmed by radiography s/p L2-5 posterior lumbar laminectomy on 8-16-18. Hospital course c/b post-op ileus and urinary retention requiring Frederick catheter. Frederick now removed. 77 y/o male w/ Hx [place], Obesity, HTN, hyperlipidemia presenting with progressively worsening symptoms of lumbar disc disease, confirmed by radiography s/p L2-5 posterior lumbar laminectomy on 8-16-18. Hospital course c/b post-op ileus and urinary retention requiring Frederick catheter. Frederick now removed and ileus resolving

## 2018-08-24 NOTE — PROGRESS NOTE ADULT - SUBJECTIVE AND OBJECTIVE BOX
Patient is seen and examined. Denies CP/SOB/Dizziness/N/V/D/HA. Pain is controlled.     Vital Signs Last 24 Hrs  T(C): 36.6 (24 Aug 2018 05:21), Max: 36.7 (23 Aug 2018 11:57)  T(F): 97.8 (24 Aug 2018 05:21), Max: 98 (23 Aug 2018 11:57)  HR: 72 (24 Aug 2018 05:21) (69 - 84)  BP: 151/76 (24 Aug 2018 05:21) (125/56 - 151/76)  BP(mean): --  RR: 18 (24 Aug 2018 05:21) (18 - 18)  SpO2: 100% (24 Aug 2018 05:21) (93% - 100%)    Gen: NAD    BACK: Dressing C/D/I.   Compartments are soft, extremities are warm. Motor intact 5/5 EHL/FHL/TA/GS. Sensation is grossly intact in the BL LE. 1+DP BL LE.       A/P: Patient is a 78y y/o Male s/p L2-L5 Lami, L3-L4 Disc    -Pain control/Analgesia  -Inc Spirometry  -Venodynes  -Advance to clears in AM  -PT/OT  -WBAT  -DTV  -Notify Ortho with any questions

## 2018-08-24 NOTE — PROGRESS NOTE ADULT - PROBLEM SELECTOR PLAN 3
S/p L2-5 posterior lumbar laminectomy on 8-16-18  - management as per ortho  - would avoid opioids as much as possible given pt's ileus.   - d/c tramadol given ongoing drowsiness. Recommend IV vs. oral acetaminophen for further pain control S/p L2-5 posterior lumbar laminectomy on 8-16-18  - management as per ortho  - would avoid opioids as much as possible given pt's ileus.   - Recommend IV vs. oral acetaminophen for further pain control

## 2018-08-25 PROCEDURE — 99232 SBSQ HOSP IP/OBS MODERATE 35: CPT

## 2018-08-25 RX ADMIN — Medication 2000 UNIT(S): at 12:35

## 2018-08-25 RX ADMIN — TAMSULOSIN HYDROCHLORIDE 0.4 MILLIGRAM(S): 0.4 CAPSULE ORAL at 22:09

## 2018-08-25 RX ADMIN — ATORVASTATIN CALCIUM 20 MILLIGRAM(S): 80 TABLET, FILM COATED ORAL at 22:09

## 2018-08-25 RX ADMIN — SIMETHICONE 80 MILLIGRAM(S): 80 TABLET, CHEWABLE ORAL at 12:35

## 2018-08-25 RX ADMIN — POLYETHYLENE GLYCOL 3350 17 GRAM(S): 17 POWDER, FOR SOLUTION ORAL at 12:35

## 2018-08-25 RX ADMIN — Medication 100 MILLIGRAM(S): at 06:47

## 2018-08-25 RX ADMIN — PANTOPRAZOLE SODIUM 40 MILLIGRAM(S): 20 TABLET, DELAYED RELEASE ORAL at 06:47

## 2018-08-25 NOTE — PROGRESS NOTE ADULT - PROBLEM SELECTOR PLAN 4
Resolved. Likely multifactorial due to post op volume depletion, antihypertensive meds, opioids/valium for pain control and constipation. TTE normal  -continue to hold antihypertensive meds for now. If BP remains stable, will consider restarting home regimen of losartan 50mg daily today and continue to hold HCTZ.   -avoid narcotics if possible  - no need for cardiac monitoring with telemetry

## 2018-08-25 NOTE — PROGRESS NOTE ADULT - ASSESSMENT
78 year old man PMH [place], Obesity, HTN, hyperlipidemia presenting with progressively worsening symptoms of lumbar disc disease, confirmed by radiography s/p L2-5 posterior lumbar laminectomy on 8-16-18. Hospital course c/b post-op ileus and urinary retention requiring Frederick catheter. Frederick now removed and ileus resolving 78 year old man PMH [place], Obesity, HTN, hyperlipidemia presenting with progressively worsening symptoms of lumbar disc disease, confirmed by radiography s/p L2-5 posterior lumbar laminectomy on 8-16-18. Hospital course c/b post-op ileus and urinary retention requiring Frederick catheter. Frederick now removed and ileus resolved

## 2018-08-25 NOTE — PROGRESS NOTE ADULT - ASSESSMENT
A/P: Patient is a 78y y/o Male s/p L2-L5 Lami, L3-L4 Disc. Doing well this AM with improved GI fxn.     -Pain control/Analgesia  -Inc Spirometry  -Venodynes  -Advance to clears today as per gen surgery  -PT/OT  -WBAT  -DTV  - continue to monitor UOP  -Notify Ortho with any questions ([id number])

## 2018-08-25 NOTE — PROGRESS NOTE ADULT - PROBLEM SELECTOR PLAN 5
BP meds held in the setting of orthostatic hypotension. BP within acceptable range while off antihypertensive meds.  -continue to monitor. If BP remains stable, will consider restarting home regimen of losartan 50mg daily today and continue to hold HCTZ. BP meds held in the setting of orthostatic hypotension. BP slightly elevated  -continue to monitor. If BP remains stable, would start losartan 50mg daily today and continue to hold HCTZ.

## 2018-08-25 NOTE — PROGRESS NOTE ADULT - PROBLEM SELECTOR PLAN 7
c/w protonix.    DISPO: Anticipate discharge this weekend if pt continues to improve and deemed appropriate for discharge by ortho

## 2018-08-25 NOTE — PROGRESS NOTE ADULT - SUBJECTIVE AND OBJECTIVE BOX
CHIEF COMPLAINT: f/u orthopedic co-managment    SUBJECTIVE / OVERNIGHT EVENTS: Patient seen and examined. No acute events overnight. Pain well controlled and patient without any complaints.    MEDICATIONS  (STANDING): reviewed  atorvastatin 20 milliGRAM(s) Oral at bedtime  cholecalciferol 2000 Unit(s) Oral daily  docusate sodium 100 milliGRAM(s) Oral three times a day  pantoprazole    Tablet 40 milliGRAM(s) Oral before breakfast  polyethylene glycol 3350 17 Gram(s) Oral daily  simethicone 80 milliGRAM(s) Chew daily  tamsulosin 0.4 milliGRAM(s) Oral at bedtime    MEDICATIONS  (PRN):  acetaminophen   Tablet 650 milliGRAM(s) Oral every 6 hours PRN For Temp greater than 38 C (100.4 F)  diazepam    Tablet 5 milliGRAM(s) Oral every 8 hours PRN Back Spasms  guaiFENesin    Syrup 100 milliGRAM(s) Oral every 6 hours PRN Cough  magnesium hydroxide Suspension 30 milliLiter(s) Oral every 12 hours PRN Constipation      VITALS:  T(F): 97.8 (08-25-18 @ 07:54), Max: 98.3 (08-24-18 @ 20:28)  HR: 89 (08-25-18 @ 07:54) (74 - 89)  BP: 152/70 (08-25-18 @ 07:54) (145/70 - 164/77)  RR: 17 (08-25-18 @ 07:54) (17 - 18)  SpO2: 99% (08-25-18 @ 07:54)  Wt(kg): --      CAPILLARY BLOOD GLUCOSE    PHYSICAL EXAM:  GENERAL: Obese, lying in bed, NAD  HEAD:  Atraumatic, Normocephalic  EYES: EOMI, PERRLA, conjunctiva and sclera clear  NECK: Supple, No JVD  CHEST/LUNG: Clear to auscultation bilaterally; No wheeze  HEART: Distant heart sounds, S1/ S2m Regular rate and rhythm;   ABDOMEN: Distended; Bowel sounds present, soft, NT  MSK: back dressings c/d/i  EXTREMITIES:  2+ Peripheral Pulses, No clubbing, cyanosis, or edema  PSYCH: AAOx3  NEUROLOGY: non-focal  SKIN: No rashes or lesions    LABS:    Prior labs reviewed      RADIOLOGY & ADDITIONAL TESTS:  Imaging Personally Reviewed: [x] Yes    [ ] Consultant(s) Notes Reviewed:  [x] Care Discussed with Consultants/Other Providers: Orthopedic resident - discussed CHIEF COMPLAINT: f/u orthopedic co-managment    SUBJECTIVE / OVERNIGHT EVENTS: Patient seen and examined. No acute events overnight. Pain well controlled and patient without any complaints. Reports food tastes unpleasant to him and he felt mild nausea after trying to eat this am. Urinating well. Passing gas and having loose BMs. Reports stomach cramps yesterday resolved. Ambulating w/ walker and reports he feels overall improved    MEDICATIONS  (STANDING): reviewed  atorvastatin 20 milliGRAM(s) Oral at bedtime  cholecalciferol 2000 Unit(s) Oral daily  docusate sodium 100 milliGRAM(s) Oral three times a day  pantoprazole    Tablet 40 milliGRAM(s) Oral before breakfast  polyethylene glycol 3350 17 Gram(s) Oral daily  simethicone 80 milliGRAM(s) Chew daily  tamsulosin 0.4 milliGRAM(s) Oral at bedtime    MEDICATIONS  (PRN):  acetaminophen   Tablet 650 milliGRAM(s) Oral every 6 hours PRN For Temp greater than 38 C (100.4 F)  diazepam    Tablet 5 milliGRAM(s) Oral every 8 hours PRN Back Spasms  guaiFENesin    Syrup 100 milliGRAM(s) Oral every 6 hours PRN Cough  magnesium hydroxide Suspension 30 milliLiter(s) Oral every 12 hours PRN Constipation      VITALS:  T(F): 97.8 (08-25-18 @ 07:54), Max: 98.3 (08-24-18 @ 20:28)  HR: 89 (08-25-18 @ 07:54) (74 - 89)  BP: 152/70 (08-25-18 @ 07:54) (145/70 - 164/77)  RR: 17 (08-25-18 @ 07:54) (17 - 18)  SpO2: 99% (08-25-18 @ 07:54)  Wt(kg): --      CAPILLARY BLOOD GLUCOSE    PHYSICAL EXAM: - pt seen with wife at bedside  GENERAL: Sitting up in chair in NAD  HEAD:  Atraumatic, Normocephalic  EYES: EOMI,  conjunctiva and sclera clear  NECK: Supple, No JVD  CHEST/LUNG: Clear to auscultation bilaterally; No wheezing/ rhonchi/rales  HEART: S1/ S2, Regular rate and rhythm;   ABDOMEN: Nondistended; Bowel sounds present, soft, NT  MSK: back dressing c/d/i  EXTREMITIES:  2+ Peripheral Pulses, No clubbing, cyanosis, or edema  PSYCH: AAOx3  NEUROLOGY: observed standing and ambulating w/ rolling walker- no gross deficits      LABS:    Prior labs reviewed      RADIOLOGY & ADDITIONAL TESTS:  Imaging Personally Reviewed: [x] Yes    [ ] Consultant(s) Notes Reviewed:  [x] Care Discussed with Consultants/Other Providers: Orthopedic resident - discussed pt doing well overall today. No further lightheadedness, resume losartan

## 2018-08-25 NOTE — PROGRESS NOTE ADULT - PROBLEM SELECTOR PLAN 1
Confirmed by abdominal X-rays. Continues to improve. Pt now with liquid stools for BM. Will continue to monitor  - advance diet as tolerated  - encouraged pt to increase mobility, try to walk if possible or OOB to chair  - supportive care as per ortho team Confirmed by abdominal X-rays. Continues to improve. Pt now with liquid stools for BM. Will continue to monitor. Can hold bowel regimen for diarrhea  - On full diet this am, monitor as he reported mild nausea  - encouraged pt to increase mobility, try to walk if possible or OOB to chair  - supportive care as per ortho team

## 2018-08-25 NOTE — PROGRESS NOTE ADULT - PROBLEM SELECTOR PLAN 2
Pt with 1300mL of urine removed after straight cath on, 8/20. Initially failed TOV and required Frederick. Frederick removed and pt now passed TOV. Retention was likely due to underlying ileus.  - can likely d/c tamsulosin which was initiated during hospitalization since pt passed TOV - Resolved  -Frederick removed and pt now passed TOV. Retention was likely due to underlying ileus.  - can likely d/c tamsulosin which was initiated during hospitalization since pt passed TOV

## 2018-08-25 NOTE — PROGRESS NOTE ADULT - SUBJECTIVE AND OBJECTIVE BOX
Orthopedic Surgery Progress Note     S: Patient seen and examined during rounds this morning. No acute events overnight. Tolerating regular diet this AM.       MEDICATIONS  (STANDING):  atorvastatin 20 milliGRAM(s) Oral at bedtime  cholecalciferol 2000 Unit(s) Oral daily  dextrose 5% + sodium chloride 0.45%. 1000 milliLiter(s) (100 mL/Hr) IV Continuous <Continuous>  docusate sodium 100 milliGRAM(s) Oral three times a day  pantoprazole    Tablet 40 milliGRAM(s) Oral before breakfast  polyethylene glycol 3350 17 Gram(s) Oral daily  simethicone 80 milliGRAM(s) Chew daily  tamsulosin 0.4 milliGRAM(s) Oral at bedtime    MEDICATIONS  (PRN):  acetaminophen   Tablet 650 milliGRAM(s) Oral every 6 hours PRN For Temp greater than 38 C (100.4 F)  diazepam    Tablet 5 milliGRAM(s) Oral every 8 hours PRN Back Spasms  guaiFENesin    Syrup 100 milliGRAM(s) Oral every 6 hours PRN Cough  magnesium hydroxide Suspension 30 milliLiter(s) Oral every 12 hours PRN Constipation      Physical Exam:    Vital Signs Last 24 Hrs  T(C): 36.8 (24 Aug 2018 20:28), Max: 36.8 (24 Aug 2018 20:28)  T(F): 98.3 (24 Aug 2018 20:28), Max: 98.3 (24 Aug 2018 20:28)  HR: 75 (24 Aug 2018 20:28) (72 - 86)  BP: 164/77 (24 Aug 2018 20:28) (142/74 - 164/77)  BP(mean): --  RR: 18 (24 Aug 2018 20:28) (17 - 18)  SpO2: 100% (24 Aug 2018 20:28) (100% - 100%)    I&O's Summary    23 Aug 2018 07:01  -  24 Aug 2018 07:00  --------------------------------------------------------  IN: 1200 mL / OUT: 875 mL / NET: 325 mL    24 Aug 2018 07:01  -  25 Aug 2018 00:03  --------------------------------------------------------  IN: 0 mL / OUT: 850 mL / NET: -850 mL        Gen: NAD    BACK: Dressing C/D/I.   MSK:  LLE  - 4+/5 strength to TA. 5/5 strength of HF/Q/H/EHL/GS  - SILT intact L2-S1.  - WWP. Brisk capillary refill.  RLE  - 5/5 strength of HF/Q/H/TA/EHL/GS  - SILT intact L2-S1.  - WWP. Brisk capillary refill.       LABS:

## 2018-08-26 VITALS
RESPIRATION RATE: 17 BRPM | OXYGEN SATURATION: 99 % | HEART RATE: 72 BPM | TEMPERATURE: 98 F | SYSTOLIC BLOOD PRESSURE: 155 MMHG | DIASTOLIC BLOOD PRESSURE: 78 MMHG

## 2018-08-26 PROCEDURE — 99232 SBSQ HOSP IP/OBS MODERATE 35: CPT

## 2018-08-26 RX ORDER — OMEPRAZOLE 10 MG/1
1 CAPSULE, DELAYED RELEASE ORAL
Qty: 0 | Refills: 0 | COMMUNITY

## 2018-08-26 RX ORDER — LOSARTAN POTASSIUM 100 MG/1
50 TABLET, FILM COATED ORAL DAILY
Qty: 0 | Refills: 0 | Status: DISCONTINUED | OUTPATIENT
Start: 2018-08-26 | End: 2018-08-26

## 2018-08-26 RX ORDER — ASPIRIN/CALCIUM CARB/MAGNESIUM 324 MG
1 TABLET ORAL
Qty: 0 | Refills: 0 | COMMUNITY

## 2018-08-26 RX ADMIN — PANTOPRAZOLE SODIUM 40 MILLIGRAM(S): 20 TABLET, DELAYED RELEASE ORAL at 05:13

## 2018-08-26 NOTE — PROGRESS NOTE ADULT - SUBJECTIVE AND OBJECTIVE BOX
Orthopedic Surgery Progress Note     No acute events overnight. Having GI function, tolerating diet.    MEDICATIONS  (STANDING):  atorvastatin 20 milliGRAM(s) Oral at bedtime  cholecalciferol 2000 Unit(s) Oral daily  dextrose 5% + sodium chloride 0.45%. 1000 milliLiter(s) (100 mL/Hr) IV Continuous <Continuous>  docusate sodium 100 milliGRAM(s) Oral three times a day  pantoprazole    Tablet 40 milliGRAM(s) Oral before breakfast  polyethylene glycol 3350 17 Gram(s) Oral daily  simethicone 80 milliGRAM(s) Chew daily  tamsulosin 0.4 milliGRAM(s) Oral at bedtime    MEDICATIONS  (PRN):  acetaminophen   Tablet 650 milliGRAM(s) Oral every 6 hours PRN For Temp greater than 38 C (100.4 F)  diazepam    Tablet 5 milliGRAM(s) Oral every 8 hours PRN Back Spasms  guaiFENesin    Syrup 100 milliGRAM(s) Oral every 6 hours PRN Cough  magnesium hydroxide Suspension 30 milliLiter(s) Oral every 12 hours PRN Constipation      Physical Exam:    ICU Vital Signs Last 24 Hrs  T(C): 36.8 (26 Aug 2018 01:10), Max: 36.8 (26 Aug 2018 01:10)  T(F): 98.2 (26 Aug 2018 01:10), Max: 98.2 (26 Aug 2018 01:10)  HR: 66 (26 Aug 2018 01:10) (66 - 90)  BP: 156/72 (26 Aug 2018 01:10) (137/64 - 156/72)  BP(mean): --  ABP: --  ABP(mean): --  RR: 18 (26 Aug 2018 01:10) (17 - 18)  SpO2: 97% (26 Aug 2018 01:10) (97% - 99%)      BACK: Dressing C/D/I.   MSK:  LLE  - 4+/5 strength to TA. 5/5 strength of HF/Q/H/EHL/GS  - SILT intact L2-S1.  - WWP. Brisk capillary refill.  RLE  - 5/5 strength of HF/Q/H/TA/EHL/GS  - SILT intact L2-S1.  - WWP. Brisk capillary refill.     A/P: Patient is a 78y y/o Male s/p L2-L5 Lami, L3-L4 Disc. Doing well this AM with improved GI fxn.     -Pain control/Analgesia  -Venodynes  -Advance to clears today as per gen surgery  -PT/OT  -WBAT  -DTV  - continue to monitor UOP  - d/c 8/26

## 2018-08-26 NOTE — PROGRESS NOTE ADULT - PROBLEM SELECTOR PLAN 5
BP meds held in the setting of orthostatic hypotension. BP slightly elevated  -continue to monitor  - f/u PCP to resume outpatient

## 2018-08-26 NOTE — PROGRESS NOTE ADULT - PROVIDER SPECIALTY LIST ADULT
Anesthesia
Cardiology
Cardiology
Hospitalist
Orthopedics
Hospitalist

## 2018-08-26 NOTE — PROGRESS NOTE ADULT - PROBLEM SELECTOR PROBLEM 7
Gastroesophageal reflux disease without esophagitis
Hypercholesterolemia
Gastroesophageal reflux disease without esophagitis
Hypercholesterolemia

## 2018-08-26 NOTE — PROGRESS NOTE ADULT - PROBLEM SELECTOR PROBLEM 5
Hypercholesterolemia
Essential hypertension
Hypercholesterolemia
Essential hypertension
Essential hypertension
Hypercholesterolemia
Orthostatic hypotension
Essential hypertension
Orthostatic hypotension

## 2018-08-26 NOTE — PROGRESS NOTE ADULT - PROBLEM SELECTOR PLAN 3
S/p L2-5 posterior lumbar laminectomy on 8-16-18  - management as per ortho  - Continue to avoid opioids as tolerated given prior ileus

## 2018-08-26 NOTE — PROGRESS NOTE ADULT - PROBLEM SELECTOR PLAN 2
- Resolved  -Frederick removed and pt now passed TOV. Retention was likely due to underlying ileus.  - can likely d/c tamsulosin which was initiated during hospitalization since pt passed TOV

## 2018-08-26 NOTE — PROGRESS NOTE ADULT - PROBLEM SELECTOR PLAN 4
Resolved. Likely multifactorial due to post op volume depletion, antihypertensive meds, opioids/valium for pain control and constipation. TTE normal  -continue to hold antihypertensive meds for now. Can f/u PCP to resume

## 2018-08-26 NOTE — PROGRESS NOTE ADULT - SUBJECTIVE AND OBJECTIVE BOX
CHIEF COMPLAINT: f/u orthopedic co-management    SUBJECTIVE / OVERNIGHT EVENTS: Patient seen and examined. No acute events overnight. Pain well controlled and patient without any complaints. Ambulating well. Reports appetite improved and he was able to eat breakfast. Still having loose BMs. Urinating well    MEDICATIONS  (STANDING): reviewed  atorvastatin 20 milliGRAM(s) Oral at bedtime  cholecalciferol 2000 Unit(s) Oral daily  docusate sodium 100 milliGRAM(s) Oral three times a day  pantoprazole    Tablet 40 milliGRAM(s) Oral before breakfast  polyethylene glycol 3350 17 Gram(s) Oral daily  simethicone 80 milliGRAM(s) Chew daily  tamsulosin 0.4 milliGRAM(s) Oral at bedtime    MEDICATIONS  (PRN):  acetaminophen   Tablet 650 milliGRAM(s) Oral every 6 hours PRN For Temp greater than 38 C (100.4 F)  guaiFENesin    Syrup 100 milliGRAM(s) Oral every 6 hours PRN Cough  magnesium hydroxide Suspension 30 milliLiter(s) Oral every 12 hours PRN Constipation      VITALS:  T(F): 97.8 (08-26-18 @ 08:17), Max: 98.2 (08-26-18 @ 01:10)  HR: 72 (08-26-18 @ 08:17) (66 - 90)  BP: 155/78 (08-26-18 @ 08:17) (137/64 - 159/80)  RR: 17 (08-26-18 @ 08:17) (17 - 18)  SpO2: 99% (08-26-18 @ 08:17)  Wt(kg): --      CAPILLARY BLOOD GLUCOSE    PHYSICAL EXAM:  GENERAL: NAD, well-developed, sitting up in bed  HEAD:  Atraumatic, Normocephalic  EYES: EOMI, PERRLA, conjunctiva and sclera clear  NECK: Supple, No JVD  CHEST/LUNG: Clear to auscultation bilaterally; No wheezing/ rhonchi/rales  HEART: S1/ S2, Regular rate and rhythm;   ABDOMEN: Soft, Nontender, Nondistended; Bowel sounds present  MSK: dressing c/d/i, dressing tape slightly lifted at bottom  EXTREMITIES:  2+ Peripheral Pulses, No clubbing, cyanosis, or edema  PSYCH: AAOx3  NEUROLOGY: moving extremities purposefully  SKIN: No rashes or lesions    LABS:      No new labs      RADIOLOGY & ADDITIONAL TESTS:  Imaging Personally Reviewed: [x] Yes    [ ] Consultant(s) Notes Reviewed:  [x] Care Discussed with Consultants/Other Providers: Orthopedic PA - discussed holding BP meds on d/c since not resumed in patient,   RN- re: dressing CHIEF COMPLAINT: f/u orthopedic co-management    SUBJECTIVE / OVERNIGHT EVENTS: Patient seen and examined. No acute events overnight. Pain well controlled and patient without any complaints. Ambulating well. Reports appetite improved and he was able to eat breakfast. Still having loose BMs. Urinating well    MEDICATIONS  (STANDING): reviewed  atorvastatin 20 milliGRAM(s) Oral at bedtime  cholecalciferol 2000 Unit(s) Oral daily  docusate sodium 100 milliGRAM(s) Oral three times a day  pantoprazole    Tablet 40 milliGRAM(s) Oral before breakfast  polyethylene glycol 3350 17 Gram(s) Oral daily  simethicone 80 milliGRAM(s) Chew daily  tamsulosin 0.4 milliGRAM(s) Oral at bedtime    MEDICATIONS  (PRN):  acetaminophen   Tablet 650 milliGRAM(s) Oral every 6 hours PRN For Temp greater than 38 C (100.4 F)  guaiFENesin    Syrup 100 milliGRAM(s) Oral every 6 hours PRN Cough  magnesium hydroxide Suspension 30 milliLiter(s) Oral every 12 hours PRN Constipation      VITALS:  T(F): 97.8 (08-26-18 @ 08:17), Max: 98.2 (08-26-18 @ 01:10)  HR: 72 (08-26-18 @ 08:17) (66 - 90)  BP: 155/78 (08-26-18 @ 08:17) (137/64 - 159/80)  RR: 17 (08-26-18 @ 08:17) (17 - 18)  SpO2: 99% (08-26-18 @ 08:17)  Wt(kg): --      CAPILLARY BLOOD GLUCOSE    PHYSICAL EXAM:  GENERAL: NAD, well-developed, sitting up in chair  HEAD:  Atraumatic, Normocephalic  EYES: EOMI, PERRLA, conjunctiva and sclera clear  NECK: Supple, No JVD  CHEST/LUNG: Clear to auscultation bilaterally; No wheezing/ rhonchi/rales  HEART: S1/ S2, Regular rate and rhythm;   ABDOMEN: Soft, Nontender, Nondistended; Bowel sounds present  MSK: dressing c/d/i, dressing tape slightly lifted at bottom  EXTREMITIES:  2+ Peripheral Pulses, No clubbing, cyanosis, or edema  PSYCH: AAOx3  NEUROLOGY: moving extremities purposefully  SKIN: No rashes or lesions    LABS:      No new labs      RADIOLOGY & ADDITIONAL TESTS:  Imaging Personally Reviewed: [x] Yes    [ ] Consultant(s) Notes Reviewed:  [x] Care Discussed with Consultants/Other Providers: Orthopedic PA - discussed holding BP meds on d/c since not resumed in patient,   RN- re: dressing

## 2018-08-26 NOTE — PROGRESS NOTE ADULT - PROBLEM SELECTOR PROBLEM 6
Gastroesophageal reflux disease without esophagitis
Hypercholesterolemia
Gastroesophageal reflux disease without esophagitis
Hypercholesterolemia
Essential hypertension
Gastroesophageal reflux disease without esophagitis
Hypercholesterolemia
Essential hypertension
Hypercholesterolemia

## 2018-08-26 NOTE — PROGRESS NOTE ADULT - PROBLEM SELECTOR PROBLEM 2
Disc disease, degenerative, lumbar or lumbosacral
Urinary retention
Disc disease, degenerative, lumbar or lumbosacral
Urinary retention
Disc disease, degenerative, lumbar or lumbosacral
Urinary retention

## 2018-08-26 NOTE — PROGRESS NOTE ADULT - PROBLEM SELECTOR PROBLEM 4
Essential hypertension
Orthostatic hypotension
Essential hypertension
Orthostatic hypotension
Disc disease, degenerative, lumbar or lumbosacral
Essential hypertension
Orthostatic hypotension
Disc disease, degenerative, lumbar or lumbosacral
Orthostatic hypotension

## 2018-08-26 NOTE — PROGRESS NOTE ADULT - PROBLEM SELECTOR PLAN 1
- Resolved. Now w/ loose stool. Can hold bowel regimen for diarrhea  - On full diet this am, monitor as he reported mild nausea  - encouraged pt to increase mobility, try to walk if possible or OOB to chair  - supportive care as per ortho team - Resolved. Now w/ loose stool. Can hold bowel regimen for diarrhea  - Tolerating full diet  - Improving mobility

## 2018-08-26 NOTE — PROGRESS NOTE ADULT - PROBLEM SELECTOR PROBLEM 3
Orthostatic hypotension
Disc disease, degenerative, lumbar or lumbosacral
Orthostatic hypotension
Disc disease, degenerative, lumbar or lumbosacral
Disc disease, degenerative, lumbar or lumbosacral
Leukocytosis, unspecified type
Orthostatic hypotension
Disc disease, degenerative, lumbar or lumbosacral
Leukocytosis, unspecified type

## 2018-08-26 NOTE — PROGRESS NOTE ADULT - ASSESSMENT
78 year old man PMH [place], Obesity, HTN, hyperlipidemia presenting with progressively worsening symptoms of lumbar disc disease, confirmed by radiography s/p L2-5 posterior lumbar laminectomy on 8-16-18. Hospital course c/b post-op ileus and urinary retention requiring Frederick catheter. Frederick now removed and ileus resolved

## 2018-08-27 ENCOUNTER — INBOUND DOCUMENT (OUTPATIENT)
Age: 78
End: 2018-08-27

## 2018-08-29 ENCOUNTER — APPOINTMENT (OUTPATIENT)
Dept: ORTHOPEDIC SURGERY | Facility: CLINIC | Age: 78
End: 2018-08-29
Payer: MEDICARE

## 2018-08-29 PROCEDURE — 99024 POSTOP FOLLOW-UP VISIT: CPT

## 2018-09-28 ENCOUNTER — APPOINTMENT (OUTPATIENT)
Dept: ORTHOPEDIC SURGERY | Facility: CLINIC | Age: 78
End: 2018-09-28
Payer: MEDICARE

## 2018-09-28 PROCEDURE — 99024 POSTOP FOLLOW-UP VISIT: CPT

## 2018-11-09 ENCOUNTER — APPOINTMENT (OUTPATIENT)
Dept: ORTHOPEDIC SURGERY | Facility: CLINIC | Age: 78
End: 2018-11-09
Payer: MEDICARE

## 2018-11-09 PROCEDURE — 99024 POSTOP FOLLOW-UP VISIT: CPT

## 2018-11-14 ENCOUNTER — FORM ENCOUNTER (OUTPATIENT)
Age: 78
End: 2018-11-14

## 2018-11-26 ENCOUNTER — CHART COPY (OUTPATIENT)
Age: 78
End: 2018-11-26

## 2019-02-08 ENCOUNTER — APPOINTMENT (OUTPATIENT)
Dept: ORTHOPEDIC SURGERY | Facility: CLINIC | Age: 79
End: 2019-02-08
Payer: MEDICARE

## 2019-02-08 VITALS
WEIGHT: 234 LBS | HEART RATE: 76 BPM | DIASTOLIC BLOOD PRESSURE: 77 MMHG | SYSTOLIC BLOOD PRESSURE: 179 MMHG | HEIGHT: 70 IN | BODY MASS INDEX: 33.5 KG/M2

## 2019-02-08 PROCEDURE — 99214 OFFICE O/P EST MOD 30 MIN: CPT

## 2019-02-08 NOTE — PHYSICAL EXAM
[Cane] : ambulates with cane [FreeTextEntry2] : His incision is healed. Improved left foot strength.

## 2019-02-08 NOTE — DISCUSSION/SUMMARY
[de-identified] : Overall he continues to slowly improve. She will continue with therapy. Followup in 2-3 months.

## 2019-02-08 NOTE — HISTORY OF PRESENT ILLNESS
[All Other ROS Normal] : All other review of systems are negative except as noted [All Hx] : past medical, family, and social [All] : medication and allergy [FreeTextEntry1] : s/p L2-5 laminectomy on 8/16/18 [FreeTextEntry2] : Mr. Cr presents to the office s/p L2-5 laminectomy on 8/16/18.  Overall he is doing well.  He can walk 1/2- 1 mile.  His legs will feel heavy at times with walking.   He is not taking any pain medications.  He is doing physical therapy.

## 2019-04-02 ENCOUNTER — APPOINTMENT (OUTPATIENT)
Dept: CARDIOLOGY | Facility: CLINIC | Age: 79
End: 2019-04-02
Payer: MEDICARE

## 2019-04-02 VITALS
HEIGHT: 70 IN | OXYGEN SATURATION: 99 % | HEART RATE: 72 BPM | SYSTOLIC BLOOD PRESSURE: 186 MMHG | BODY MASS INDEX: 34.36 KG/M2 | DIASTOLIC BLOOD PRESSURE: 80 MMHG | WEIGHT: 240 LBS

## 2019-04-02 PROCEDURE — 99214 OFFICE O/P EST MOD 30 MIN: CPT

## 2019-04-02 NOTE — REASON FOR VISIT
[Initial Evaluation] : an initial evaluation of [Abnormal ECG] : an abnormal ECG [Hyperlipidemia] : hyperlipidemia [Hypertension] : hypertension [FreeTextEntry1] : Preop evaluation

## 2019-04-02 NOTE — HISTORY OF PRESENT ILLNESS
[FreeTextEntry1] : I saw Eddie Cr in the office today for cardiac follow up. He is a 79-year-old white male who I initially saw in  prior to back surgery. He had a chemical nuclear stress test that was normal with an EF of 61%.The patient had back surgery  for spinal stenosis and what well. He is feeling better but still walking with a cane. Is having no chest pain or shortness of breath\par \par His past medical history is significant for hypertension and hyperlipidemia. He stopped smoking in . His father  of myocardial infarction at age 65. He has no diabetes.\par \par He had an echocardiogram  that showed an ejection fraction of 60-65% was stage I diastolic dysfunction. Carotid Doppler performed  showed fatty streaks. The patient had a remote stress test.\par

## 2019-04-02 NOTE — PHYSICAL EXAM
[General Appearance - Well Developed] : well developed [Normal Appearance] : normal appearance [Well Groomed] : well groomed [General Appearance - Well Nourished] : well nourished [No Deformities] : no deformities [General Appearance - In No Acute Distress] : no acute distress [Normal Conjunctiva] : the conjunctiva exhibited no abnormalities [Normal Oral Mucosa] : normal oral mucosa [Normal Jugular Venous A Waves Present] : normal jugular venous A waves present [Normal Jugular Venous V Waves Present] : normal jugular venous V waves present [No Jugular Venous Wilkes A Waves] : no jugular venous wilkes A waves [Respiration, Rhythm And Depth] : normal respiratory rhythm and effort [Exaggerated Use Of Accessory Muscles For Inspiration] : no accessory muscle use [Auscultation Breath Sounds / Voice Sounds] : lungs were clear to auscultation bilaterally [Bowel Sounds] : normal bowel sounds [Abdomen Soft] : soft [Abdomen Tenderness] : non-tender [Abnormal Walk] : normal gait [Gait - Sufficient For Exercise Testing] : the gait was sufficient for exercise testing [Nail Clubbing] : no clubbing of the fingernails [Cyanosis, Localized] : no localized cyanosis [Skin Color & Pigmentation] : normal skin color and pigmentation [Skin Turgor] : normal skin turgor [] : no rash [Oriented To Time, Place, And Person] : oriented to person, place, and time [Impaired Insight] : insight and judgment were intact [No Anxiety] : not feeling anxious [Normal Rate] : normal [Normal S1] : normal S1 [Normal S2] : normal S2 [No Murmur] : no murmurs heard [2+] : left 2+ [1+] : left 1+ [No Abnormalities] : the abdominal aorta was not enlarged and no bruit was heard [No Pitting Edema] : no pitting edema present [S3] : no S3 [S4] : no S4 [Right Carotid Bruit] : no bruit heard over the right carotid [Left Carotid Bruit] : no bruit heard over the left carotid [Right Femoral Bruit] : no bruit heard over the right femoral artery [Left Femoral Bruit] : no bruit heard over the left femoral artery

## 2019-04-02 NOTE — REVIEW OF SYSTEMS
[Joint Pain] : joint pain [Knee Pain] : knee pain [Lower Back Pain] : lower back pain [Negative] : Genitourinary [Fever] : no fever [Headache] : no headache [Chills] : no chills [Feeling Fatigued] : not feeling fatigued [Blurry Vision] : no blurred vision [Seeing Double (Diplopia)] : no diplopia [Skin: A Rash] : no rash: [Dizziness] : no dizziness [Depression] : no depression [Anxiety] : no anxiety [Excessive Thirst] : no polydipsia [Easy Bleeding] : no tendency for easy bleeding [Easy Bruising] : no tendency for easy bruising

## 2019-04-02 NOTE — DISCUSSION/SUMMARY
[FreeTextEntry1] : The patient's cardiac status is stable without any signs or symptoms of active heart disease. On his medications blood pressure is okay. I would like to review his blood work. I told the patient he should start taking the Crestor every day.\par \par He does have some peripheral edema and will restart using a support stockings. He sees a vascular surgeon. He is on a low-salt diet, and needs to walk and elevate his legs when he can.\par \par If all is well I will see the patient in 6 months.

## 2019-04-22 ENCOUNTER — CHART COPY (OUTPATIENT)
Age: 79
End: 2019-04-22

## 2019-04-24 ENCOUNTER — CHART COPY (OUTPATIENT)
Age: 79
End: 2019-04-24

## 2019-05-01 ENCOUNTER — APPOINTMENT (OUTPATIENT)
Dept: ORTHOPEDIC SURGERY | Facility: CLINIC | Age: 79
End: 2019-05-01
Payer: MEDICARE

## 2019-05-01 PROCEDURE — 99214 OFFICE O/P EST MOD 30 MIN: CPT

## 2019-05-01 NOTE — HISTORY OF PRESENT ILLNESS
[All Other ROS Normal] : All other review of systems are negative except as noted [All Hx] : past medical, family, and social [All] : medication and allergy [FreeTextEntry1] : s/p L2-5 laminectomy on 8/16/18 [FreeTextEntry2] : Mr. Cr presents to the office s/p L2-5 laminectomy on 8/16/18.  Overall he is doing well.  He complains of back stiffness.  He can walk about a mile but will need to sit because of the back tightness.

## 2019-05-01 NOTE — DISCUSSION/SUMMARY
[de-identified] : His main complaint is one of some imbalance. Previous cervical MRI did not reveal any high-grade stenosis to account for this. No long tract signs. He reports improvement is neurogenic claudication. He will try some OT for balance issues. I have also encouraged him to follow up again with the neurologist regarding this issue. Followup with me in 3 months.

## 2019-06-04 LAB
ALBUMIN SERPL ELPH-MCNC: 4.3 G/DL
ALP BLD-CCNC: 72 U/L
ALT SERPL-CCNC: 24 U/L
ANION GAP SERPL CALC-SCNC: 12 MMOL/L
AST SERPL-CCNC: 33 U/L
BASOPHILS # BLD AUTO: 0.06 K/UL
BASOPHILS NFR BLD AUTO: 1 %
BILIRUB SERPL-MCNC: 0.4 MG/DL
BUN SERPL-MCNC: 25 MG/DL
CALCIUM SERPL-MCNC: 10.1 MG/DL
CHLORIDE SERPL-SCNC: 109 MMOL/L
CHOLEST SERPL-MCNC: 171 MG/DL
CHOLEST/HDLC SERPL: 5.9 RATIO
CO2 SERPL-SCNC: 26 MMOL/L
CREAT SERPL-MCNC: 1.24 MG/DL
EOSINOPHIL # BLD AUTO: 0.24 K/UL
EOSINOPHIL NFR BLD AUTO: 3.9 %
GLUCOSE SERPL-MCNC: 106 MG/DL
HCT VFR BLD CALC: 41.2 %
HDLC SERPL-MCNC: 29 MG/DL
HGB BLD-MCNC: 12.8 G/DL
IMM GRANULOCYTES NFR BLD AUTO: 0.3 %
LDLC SERPL CALC-MCNC: 101 MG/DL
LDLC SERPL DIRECT ASSAY-MCNC: 108 MG/DL
LYMPHOCYTES # BLD AUTO: 1.2 K/UL
LYMPHOCYTES NFR BLD AUTO: 19.5 %
MAGNESIUM SERPL-MCNC: 2.1 MG/DL
MAN DIFF?: NORMAL
MCHC RBC-ENTMCNC: 30.8 PG
MCHC RBC-ENTMCNC: 31.1 GM/DL
MCV RBC AUTO: 99 FL
MONOCYTES # BLD AUTO: 0.67 K/UL
MONOCYTES NFR BLD AUTO: 10.9 %
NEUTROPHILS # BLD AUTO: 3.96 K/UL
NEUTROPHILS NFR BLD AUTO: 64.4 %
PLATELET # BLD AUTO: 176 K/UL
POTASSIUM SERPL-SCNC: 5.3 MMOL/L
PROT SERPL-MCNC: 6.7 G/DL
RBC # BLD: 4.16 M/UL
RBC # FLD: 13.6 %
SODIUM SERPL-SCNC: 147 MMOL/L
TRIGL SERPL-MCNC: 203 MG/DL
TSH SERPL-ACNC: 3.25 UIU/ML
WBC # FLD AUTO: 6.15 K/UL

## 2019-06-06 DIAGNOSIS — R60.0 LOCALIZED EDEMA: ICD-10-CM

## 2019-08-14 ENCOUNTER — CHART COPY (OUTPATIENT)
Age: 79
End: 2019-08-14

## 2019-08-14 ENCOUNTER — APPOINTMENT (OUTPATIENT)
Dept: ORTHOPEDIC SURGERY | Facility: CLINIC | Age: 79
End: 2019-08-14
Payer: MEDICARE

## 2019-08-14 VITALS
WEIGHT: 240 LBS | HEART RATE: 74 BPM | SYSTOLIC BLOOD PRESSURE: 157 MMHG | BODY MASS INDEX: 34.36 KG/M2 | HEIGHT: 70 IN | DIASTOLIC BLOOD PRESSURE: 74 MMHG

## 2019-08-14 PROCEDURE — 99214 OFFICE O/P EST MOD 30 MIN: CPT

## 2019-08-14 NOTE — DISCUSSION/SUMMARY
[de-identified] : We discussed further treatment options including getting repeat image. Overall he feels he is doing well and does not want further imaging at this time. He will follow up with the neurologist and contact me afterwards.

## 2019-08-14 NOTE — HISTORY OF PRESENT ILLNESS
[FreeTextEntry1] : s/p L2-5 laminectomy on 8/16/18 [FreeTextEntry2] : Mr. Cr presents to the office s/p L2-5 laminectomy on 8/16/18.  Overall he is doing well.  He complains of back stiffness.  He can walk about a mile - mile and a half.  He feels his balance is off, however he has not seen a Neurologist yet which was recommended at his last visit.   He does have an appointment.

## 2019-10-07 ENCOUNTER — APPOINTMENT (OUTPATIENT)
Dept: CARDIOLOGY | Facility: CLINIC | Age: 79
End: 2019-10-07
Payer: MEDICARE

## 2019-10-07 ENCOUNTER — NON-APPOINTMENT (OUTPATIENT)
Age: 79
End: 2019-10-07

## 2019-10-07 VITALS
WEIGHT: 240 LBS | BODY MASS INDEX: 34.36 KG/M2 | DIASTOLIC BLOOD PRESSURE: 74 MMHG | SYSTOLIC BLOOD PRESSURE: 155 MMHG | HEART RATE: 75 BPM | OXYGEN SATURATION: 97 % | HEIGHT: 70 IN

## 2019-10-07 PROCEDURE — 99214 OFFICE O/P EST MOD 30 MIN: CPT

## 2019-10-07 PROCEDURE — 93000 ELECTROCARDIOGRAM COMPLETE: CPT

## 2019-10-07 NOTE — DISCUSSION/SUMMARY
[FreeTextEntry1] : Clinically the patient is doing well without any signs or symptoms of active heart disease. He has no chest pain, shortness of breath, or palpitation. Blood pressure and cholesterol are under reasonable control. Needs to watch his carbohydrates and continue to lose weight. This will help his triglycerides.\par \par The patient will stay on his present medication. We did review his most recent blood work and went over his medication. If all is well I will see him at the end of December at which time we'll repeat his blood work. Hopefully with weight loss his triglycerides will come down to 150. If he does have any symptoms he would call me.

## 2019-10-07 NOTE — HISTORY OF PRESENT ILLNESS
[FreeTextEntry1] : I saw Eddie Cr in the office today for cardiac follow up. He is a 79-year-old white male who I initially saw in  prior to back surgery. He had a chemical nuclear stress test that was normal with an EF of 61%.The patient had back surgery  for spinal stenosis and what well. He is feeling better but still walking with a cane. Is having no chest pain or shortness of breath\par \par His past medical history is significant for hypertension and hyperlipidemia. He stopped smoking in . His father  of myocardial infarction at age 65. He has no diabetes.\par \par He had an echocardiogram  that showed an ejection fraction of 60-65% was stage I diastolic dysfunction. Carotid Doppler performed  showed fatty streaks. The patient had a remote stress test.\par \par Blood work  demonstrated a cholesterol 171, triglycerides 203, HDL 29, . Glucose 106.\par \par The patient walks with a cane because of balance issues. He is watching his carbohydrates and trying to lose weight. ECG shows atrial rhythm without change.\par

## 2019-10-07 NOTE — REVIEW OF SYSTEMS
[Joint Pain] : joint pain [Knee Pain] : knee pain [Lower Back Pain] : lower back pain [Negative] : Genitourinary [Fever] : no fever [Headache] : no headache [Chills] : no chills [Feeling Fatigued] : not feeling fatigued [Blurry Vision] : no blurred vision [Seeing Double (Diplopia)] : no diplopia [Skin: A Rash] : no rash: [Dizziness] : no dizziness [Anxiety] : no anxiety [Depression] : no depression [Excessive Thirst] : no polydipsia [Easy Bleeding] : no tendency for easy bleeding [Easy Bruising] : no tendency for easy bruising

## 2019-12-19 ENCOUNTER — APPOINTMENT (OUTPATIENT)
Dept: CARDIOLOGY | Facility: CLINIC | Age: 79
End: 2019-12-19
Payer: MEDICARE

## 2019-12-19 VITALS
OXYGEN SATURATION: 96 % | SYSTOLIC BLOOD PRESSURE: 178 MMHG | BODY MASS INDEX: 34.07 KG/M2 | HEIGHT: 70 IN | WEIGHT: 238 LBS | DIASTOLIC BLOOD PRESSURE: 84 MMHG | HEART RATE: 70 BPM

## 2019-12-19 PROCEDURE — 99214 OFFICE O/P EST MOD 30 MIN: CPT

## 2019-12-19 NOTE — PHYSICAL EXAM
[General Appearance - Well Developed] : well developed [General Appearance - Well Nourished] : well nourished [Well Groomed] : well groomed [No Deformities] : no deformities [Normal Appearance] : normal appearance [General Appearance - In No Acute Distress] : no acute distress [Normal Conjunctiva] : the conjunctiva exhibited no abnormalities [Normal Jugular Venous V Waves Present] : normal jugular venous V waves present [Normal Oral Mucosa] : normal oral mucosa [Normal Jugular Venous A Waves Present] : normal jugular venous A waves present [No Jugular Venous Wilkes A Waves] : no jugular venous wilkes A waves [Respiration, Rhythm And Depth] : normal respiratory rhythm and effort [Auscultation Breath Sounds / Voice Sounds] : lungs were clear to auscultation bilaterally [Exaggerated Use Of Accessory Muscles For Inspiration] : no accessory muscle use [Abdomen Tenderness] : non-tender [Abdomen Soft] : soft [Bowel Sounds] : normal bowel sounds [Gait - Sufficient For Exercise Testing] : the gait was sufficient for exercise testing [Nail Clubbing] : no clubbing of the fingernails [Abnormal Walk] : normal gait [Cyanosis, Localized] : no localized cyanosis [Skin Turgor] : normal skin turgor [Skin Color & Pigmentation] : normal skin color and pigmentation [Impaired Insight] : insight and judgment were intact [Oriented To Time, Place, And Person] : oriented to person, place, and time [] : no rash [Normal S1] : normal S1 [No Anxiety] : not feeling anxious [Normal Rate] : normal [Normal S2] : normal S2 [No Murmur] : no murmurs heard [2+] : left 2+ [No Abnormalities] : the abdominal aorta was not enlarged and no bruit was heard [1+] : right 1+ [No Pitting Edema] : no pitting edema present [S3] : no S3 [S4] : no S4 [Right Carotid Bruit] : no bruit heard over the right carotid [Right Femoral Bruit] : no bruit heard over the right femoral artery [Left Carotid Bruit] : no bruit heard over the left carotid [Left Femoral Bruit] : no bruit heard over the left femoral artery

## 2019-12-19 NOTE — REVIEW OF SYSTEMS
[Joint Pain] : joint pain [Knee Pain] : knee pain [Lower Back Pain] : lower back pain [Negative] : Gastrointestinal [Fever] : no fever [Headache] : no headache [Chills] : no chills [Feeling Fatigued] : not feeling fatigued [Blurry Vision] : no blurred vision [Seeing Double (Diplopia)] : no diplopia [Skin: A Rash] : no rash: [Dizziness] : no dizziness [Depression] : no depression [Anxiety] : no anxiety [Excessive Thirst] : no polydipsia [Easy Bleeding] : no tendency for easy bleeding [Easy Bruising] : no tendency for easy bruising

## 2019-12-19 NOTE — DISCUSSION/SUMMARY
[FreeTextEntry1] : The patient is feeling well without any signs or symptoms of active heart disease. Blood pressure is reasonable. He is having no chest pain, shortness of breath, palpitations. He tries to walk as much as he can and tries to be careful with the carbohydrates in his diet.\par \par He'll go for blood work today. Pending the results of that test we may adjust his medication. If he does well I will see him in 4 months. He will call me with any problems or symptoms\par \par We did go over his medications and we did go over his prior blood work and we discussed the importance of lifestyle.

## 2019-12-19 NOTE — REASON FOR VISIT
[Abnormal ECG] : an abnormal ECG [Hyperlipidemia] : hyperlipidemia [Follow-Up - Clinic] : a clinic follow-up of [Hypertension] : hypertension

## 2019-12-19 NOTE — HISTORY OF PRESENT ILLNESS
[FreeTextEntry1] : I saw Eddie Cr in the office today for cardiac follow up. He is a 79-year-old white male who I initially saw in  prior to back surgery. He had a chemical nuclear stress test that was normal with an EF of 61%.The patient had back surgery  for spinal stenosis and what well. He is feeling better but still walking with a cane. Is having no chest pain or shortness of breath\par \par His past medical history is significant for hypertension and hyperlipidemia. He stopped smoking in . His father  of myocardial infarction at age 65. He has no diabetes.\par \par He had an echocardiogram  that showed an ejection fraction of 60-65% was stage I diastolic dysfunction. Carotid Doppler performed  showed fatty streaks. The patient had a remote stress test.\par \par Blood work  demonstrated a cholesterol 171, triglycerides 203, HDL 29, . Glucose 106.\par \par The patient walks with a cane because of balance issues. He is watching his carbohydrates and trying to lose weight. By my scale the patient has lost 2 pounds.\par

## 2019-12-22 LAB
ALBUMIN SERPL ELPH-MCNC: 4.7 G/DL
ALP BLD-CCNC: 79 U/L
ALT SERPL-CCNC: 21 U/L
ANION GAP SERPL CALC-SCNC: 12 MMOL/L
AST SERPL-CCNC: 27 U/L
BASOPHILS # BLD AUTO: 0.06 K/UL
BASOPHILS NFR BLD AUTO: 0.8 %
BILIRUB SERPL-MCNC: 0.5 MG/DL
BUN SERPL-MCNC: 27 MG/DL
CALCIUM SERPL-MCNC: 10.3 MG/DL
CHLORIDE SERPL-SCNC: 102 MMOL/L
CHOLEST SERPL-MCNC: 157 MG/DL
CHOLEST/HDLC SERPL: 4.4 RATIO
CO2 SERPL-SCNC: 28 MMOL/L
CREAT SERPL-MCNC: 1.21 MG/DL
EOSINOPHIL # BLD AUTO: 0.28 K/UL
EOSINOPHIL NFR BLD AUTO: 3.9 %
ESTIMATED AVERAGE GLUCOSE: 114 MG/DL
GLUCOSE SERPL-MCNC: 109 MG/DL
HBA1C MFR BLD HPLC: 5.6 %
HCT VFR BLD CALC: 41.8 %
HDLC SERPL-MCNC: 36 MG/DL
HGB BLD-MCNC: 13.4 G/DL
IMM GRANULOCYTES NFR BLD AUTO: 0.4 %
LDLC SERPL CALC-MCNC: 96 MG/DL
LDLC SERPL DIRECT ASSAY-MCNC: 109 MG/DL
LYMPHOCYTES # BLD AUTO: 1.43 K/UL
LYMPHOCYTES NFR BLD AUTO: 20 %
MAN DIFF?: NORMAL
MCHC RBC-ENTMCNC: 30.9 PG
MCHC RBC-ENTMCNC: 32.1 GM/DL
MCV RBC AUTO: 96.5 FL
MONOCYTES # BLD AUTO: 0.65 K/UL
MONOCYTES NFR BLD AUTO: 9.1 %
NEUTROPHILS # BLD AUTO: 4.71 K/UL
NEUTROPHILS NFR BLD AUTO: 65.8 %
PLATELET # BLD AUTO: 198 K/UL
POTASSIUM SERPL-SCNC: 5.3 MMOL/L
PROT SERPL-MCNC: 6.9 G/DL
PSA SERPL-MCNC: 2.46 NG/ML
RBC # BLD: 4.33 M/UL
RBC # FLD: 13.6 %
SODIUM SERPL-SCNC: 142 MMOL/L
TRIGL SERPL-MCNC: 124 MG/DL
WBC # FLD AUTO: 7.16 K/UL

## 2019-12-23 ENCOUNTER — RX RENEWAL (OUTPATIENT)
Age: 79
End: 2019-12-23

## 2020-01-08 ENCOUNTER — CHART COPY (OUTPATIENT)
Age: 80
End: 2020-01-08

## 2020-03-31 ENCOUNTER — NON-APPOINTMENT (OUTPATIENT)
Age: 80
End: 2020-03-31

## 2020-03-31 ENCOUNTER — APPOINTMENT (OUTPATIENT)
Dept: CARDIOLOGY | Facility: CLINIC | Age: 80
End: 2020-03-31
Payer: MEDICARE

## 2020-03-31 VITALS
BODY MASS INDEX: 33.93 KG/M2 | OXYGEN SATURATION: 98 % | HEART RATE: 68 BPM | WEIGHT: 237 LBS | SYSTOLIC BLOOD PRESSURE: 164 MMHG | DIASTOLIC BLOOD PRESSURE: 79 MMHG | HEIGHT: 70 IN

## 2020-03-31 PROCEDURE — 93000 ELECTROCARDIOGRAM COMPLETE: CPT

## 2020-03-31 PROCEDURE — 99214 OFFICE O/P EST MOD 30 MIN: CPT

## 2020-03-31 NOTE — HISTORY OF PRESENT ILLNESS
[FreeTextEntry1] : I saw Eddie Cr in the office today for cardiac follow up. He is a 80-year-old white male who I initially saw in  prior to back surgery. He had a chemical nuclear stress test  that was normal with an EF of 61%.The patient had back surgery  for spinal stenosis and what well. He is feeling better but still walking with a cane. Is having no chest pain or shortness of breath\par \par His past medical history is significant for hypertension and hyperlipidemia. He stopped smoking in . His father  of myocardial infarction at age 65. He has no diabetes.\par \par He had an echocardiogram  that showed an ejection fraction of 60-65% was stage I diastolic dysfunction. Carotid Doppler performed  showed fatty streaks. \par \par Blood work  demonstrated a cholesterol 157, triglycerides 124, HDL 36, LDL 96( direct ).. Glucose 109 and A1C 5.6..\par \par The patient walks with a cane because of balance issues. He is watching his carbohydrates and trying to lose weight. By my scale the patient has lost 1 pound.\par \par The patient had been feeling well. Approximately 2 weeks ago while he was standing and barbecuing, or event. Apparently there was no awareness of getting dizzy or lightheaded. His girlfriend was with him but did not witness the event. He was found to be on the floor and a pallet and fallen backwards and hit his head. He was walked inside and next remembers waking up in a chair. He did not seek medical attention and since that time has felt well. That day he had nothing to eat between breakfast and dinner and did not drink any water or beverage the entire day. He was very dehydrated.\par \par ECG demonstrates non sinus atrial rhythm without acute change.\par

## 2020-03-31 NOTE — DISCUSSION/SUMMARY
[FreeTextEntry1] : Most likely episode of syncope was due to a vagal reaction in the setting of significant dehydration. Within the past one to 2 years he's had a normal echo and stress test. I would like to review blood work done in office about 3 weeks ago.\par \par He will wear a 24-hour Holter monitor to check on any possible cardiac arrhythmia. I would not pursue any further cardiac workup unless he has further symptoms at which time we may send him to electrophysiologist. He saw the neurologist who found some vertigo. Otherwise there was no neurological cause for his loss of consciousness. He had an MR scan that was normal.\par \par We did discuss the importance of good hydration. If he has further episodes he will call me. Otherwise he'll stay on his present medication. His blood pressure is well-controlled without orthostatic change.

## 2020-04-01 ENCOUNTER — APPOINTMENT (OUTPATIENT)
Dept: CARDIOLOGY | Facility: CLINIC | Age: 80
End: 2020-04-01
Payer: MEDICARE

## 2020-04-08 PROCEDURE — 93224 XTRNL ECG REC UP TO 48 HRS: CPT

## 2020-07-16 NOTE — PROGRESS NOTE ADULT - ASSESSMENT
79 y/o male w/ Hx [place], Obesity, HTN, hyperlipidemia presenting with progressively worsening symptoms of lumbar disc disease, confirmed by radiography s/p L2-5 posterior lumbar laminectomy on 8-16-18. Hospital course c/b post-op ileus and urinary retention requiring Frederick catheter. .

## 2020-07-21 ENCOUNTER — RX RENEWAL (OUTPATIENT)
Age: 80
End: 2020-07-21

## 2021-03-17 ENCOUNTER — INPATIENT (INPATIENT)
Facility: HOSPITAL | Age: 81
LOS: 1 days | Discharge: HOME HEALTH | End: 2021-03-19
Attending: INTERNAL MEDICINE | Admitting: INTERNAL MEDICINE
Payer: MEDICARE

## 2021-03-17 ENCOUNTER — APPOINTMENT (OUTPATIENT)
Dept: CARDIOLOGY | Facility: CLINIC | Age: 81
End: 2021-03-17

## 2021-03-17 VITALS
SYSTOLIC BLOOD PRESSURE: 89 MMHG | HEART RATE: 85 BPM | HEIGHT: 70 IN | TEMPERATURE: 98 F | DIASTOLIC BLOOD PRESSURE: 58 MMHG | RESPIRATION RATE: 18 BRPM | OXYGEN SATURATION: 99 %

## 2021-03-17 DIAGNOSIS — R22.9 LOCALIZED SWELLING, MASS AND LUMP, UNSPECIFIED: Chronic | ICD-10-CM

## 2021-03-17 DIAGNOSIS — Z29.9 ENCOUNTER FOR PROPHYLACTIC MEASURES, UNSPECIFIED: ICD-10-CM

## 2021-03-17 DIAGNOSIS — I10 ESSENTIAL (PRIMARY) HYPERTENSION: ICD-10-CM

## 2021-03-17 DIAGNOSIS — R55 SYNCOPE AND COLLAPSE: ICD-10-CM

## 2021-03-17 DIAGNOSIS — E78.00 PURE HYPERCHOLESTEROLEMIA, UNSPECIFIED: ICD-10-CM

## 2021-03-17 DIAGNOSIS — U07.1 COVID-19: ICD-10-CM

## 2021-03-17 DIAGNOSIS — H26.9 UNSPECIFIED CATARACT: Chronic | ICD-10-CM

## 2021-03-17 DIAGNOSIS — K21.9 GASTRO-ESOPHAGEAL REFLUX DISEASE WITHOUT ESOPHAGITIS: ICD-10-CM

## 2021-03-17 LAB
ALBUMIN SERPL ELPH-MCNC: 4 G/DL — SIGNIFICANT CHANGE UP (ref 3.3–5)
ALP SERPL-CCNC: 75 U/L — SIGNIFICANT CHANGE UP (ref 40–120)
ALT FLD-CCNC: 22 U/L — SIGNIFICANT CHANGE UP (ref 4–41)
ANION GAP SERPL CALC-SCNC: 9 MMOL/L — SIGNIFICANT CHANGE UP (ref 7–14)
APTT BLD: 28.8 SEC — SIGNIFICANT CHANGE UP (ref 27–36.3)
AST SERPL-CCNC: 28 U/L — SIGNIFICANT CHANGE UP (ref 4–40)
BILIRUB SERPL-MCNC: 0.3 MG/DL — SIGNIFICANT CHANGE UP (ref 0.2–1.2)
BUN SERPL-MCNC: 22 MG/DL — SIGNIFICANT CHANGE UP (ref 7–23)
CALCIUM SERPL-MCNC: 9.1 MG/DL — SIGNIFICANT CHANGE UP (ref 8.4–10.5)
CHLORIDE SERPL-SCNC: 105 MMOL/L — SIGNIFICANT CHANGE UP (ref 98–107)
CO2 SERPL-SCNC: 27 MMOL/L — SIGNIFICANT CHANGE UP (ref 22–31)
CREAT SERPL-MCNC: 1.36 MG/DL — HIGH (ref 0.5–1.3)
GLUCOSE SERPL-MCNC: 97 MG/DL — SIGNIFICANT CHANGE UP (ref 70–99)
HCT VFR BLD CALC: 41.8 % — SIGNIFICANT CHANGE UP (ref 39–50)
HGB BLD-MCNC: 13.2 G/DL — SIGNIFICANT CHANGE UP (ref 13–17)
IANC: 2.32 K/UL — SIGNIFICANT CHANGE UP (ref 1.5–8.5)
INR BLD: 1.19 RATIO — HIGH (ref 0.88–1.16)
MCHC RBC-ENTMCNC: 29.7 PG — SIGNIFICANT CHANGE UP (ref 27–34)
MCHC RBC-ENTMCNC: 31.6 GM/DL — LOW (ref 32–36)
MCV RBC AUTO: 94.1 FL — SIGNIFICANT CHANGE UP (ref 80–100)
NT-PROBNP SERPL-SCNC: 216 PG/ML — SIGNIFICANT CHANGE UP
PLATELET # BLD AUTO: 138 K/UL — LOW (ref 150–400)
POTASSIUM SERPL-MCNC: 4.4 MMOL/L — SIGNIFICANT CHANGE UP (ref 3.5–5.3)
POTASSIUM SERPL-SCNC: 4.4 MMOL/L — SIGNIFICANT CHANGE UP (ref 3.5–5.3)
PROT SERPL-MCNC: 6.3 G/DL — SIGNIFICANT CHANGE UP (ref 6–8.3)
PROTHROM AB SERPL-ACNC: 13.5 SEC — SIGNIFICANT CHANGE UP (ref 10.6–13.6)
RBC # BLD: 4.44 M/UL — SIGNIFICANT CHANGE UP (ref 4.2–5.8)
RBC # FLD: 13.6 % — SIGNIFICANT CHANGE UP (ref 10.3–14.5)
SARS-COV-2 RNA SPEC QL NAA+PROBE: DETECTED
SODIUM SERPL-SCNC: 141 MMOL/L — SIGNIFICANT CHANGE UP (ref 135–145)
TROPONIN T, HIGH SENSITIVITY RESULT: 39 NG/L — SIGNIFICANT CHANGE UP
TROPONIN T, HIGH SENSITIVITY RESULT: 39 NG/L — SIGNIFICANT CHANGE UP
WBC # BLD: 3.96 K/UL — SIGNIFICANT CHANGE UP (ref 3.8–10.5)
WBC # FLD AUTO: 3.96 K/UL — SIGNIFICANT CHANGE UP (ref 3.8–10.5)

## 2021-03-17 PROCEDURE — 71046 X-RAY EXAM CHEST 2 VIEWS: CPT | Mod: 26

## 2021-03-17 PROCEDURE — 99285 EMERGENCY DEPT VISIT HI MDM: CPT | Mod: CS,25

## 2021-03-17 PROCEDURE — 93010 ELECTROCARDIOGRAM REPORT: CPT

## 2021-03-17 PROCEDURE — 93308 TTE F-UP OR LMTD: CPT | Mod: 26

## 2021-03-17 PROCEDURE — 99223 1ST HOSP IP/OBS HIGH 75: CPT | Mod: CS

## 2021-03-17 PROCEDURE — 70450 CT HEAD/BRAIN W/O DYE: CPT | Mod: 26

## 2021-03-17 RX ORDER — SODIUM CHLORIDE 9 MG/ML
500 INJECTION INTRAMUSCULAR; INTRAVENOUS; SUBCUTANEOUS ONCE
Refills: 0 | Status: COMPLETED | OUTPATIENT
Start: 2021-03-17 | End: 2021-03-17

## 2021-03-17 RX ORDER — LOSARTAN/HYDROCHLOROTHIAZIDE 100MG-25MG
1 TABLET ORAL
Qty: 0 | Refills: 0 | DISCHARGE

## 2021-03-17 RX ORDER — CHOLECALCIFEROL (VITAMIN D3) 125 MCG
1 CAPSULE ORAL
Qty: 0 | Refills: 0 | DISCHARGE

## 2021-03-17 RX ORDER — PANTOPRAZOLE SODIUM 20 MG/1
40 TABLET, DELAYED RELEASE ORAL
Refills: 0 | Status: DISCONTINUED | OUTPATIENT
Start: 2021-03-17 | End: 2021-03-19

## 2021-03-17 RX ORDER — ROSUVASTATIN CALCIUM 5 MG/1
1 TABLET ORAL
Qty: 0 | Refills: 0 | DISCHARGE

## 2021-03-17 RX ORDER — OMEPRAZOLE 10 MG/1
1 CAPSULE, DELAYED RELEASE ORAL
Qty: 0 | Refills: 0 | DISCHARGE

## 2021-03-17 RX ORDER — ENOXAPARIN SODIUM 100 MG/ML
40 INJECTION SUBCUTANEOUS DAILY
Refills: 0 | Status: DISCONTINUED | OUTPATIENT
Start: 2021-03-17 | End: 2021-03-19

## 2021-03-17 RX ORDER — CHOLECALCIFEROL (VITAMIN D3) 125 MCG
2000 CAPSULE ORAL DAILY
Refills: 0 | Status: DISCONTINUED | OUTPATIENT
Start: 2021-03-17 | End: 2021-03-19

## 2021-03-17 RX ORDER — ACETAMINOPHEN 500 MG
650 TABLET ORAL EVERY 6 HOURS
Refills: 0 | Status: DISCONTINUED | OUTPATIENT
Start: 2021-03-17 | End: 2021-03-19

## 2021-03-17 RX ORDER — ATORVASTATIN CALCIUM 80 MG/1
20 TABLET, FILM COATED ORAL AT BEDTIME
Refills: 0 | Status: DISCONTINUED | OUTPATIENT
Start: 2021-03-17 | End: 2021-03-19

## 2021-03-17 RX ADMIN — Medication 100 MILLIGRAM(S): at 21:48

## 2021-03-17 RX ADMIN — SODIUM CHLORIDE 500 MILLILITER(S): 9 INJECTION INTRAMUSCULAR; INTRAVENOUS; SUBCUTANEOUS at 13:41

## 2021-03-17 RX ADMIN — ATORVASTATIN CALCIUM 20 MILLIGRAM(S): 80 TABLET, FILM COATED ORAL at 21:48

## 2021-03-17 NOTE — H&P ADULT - NSHPREVIEWOFSYSTEMS_GEN_ALL_CORE
ROS:    Constitutional: [ ] fevers [ ] chills [ ] weight loss [ ] weight gain  HEENT: [ ] dry eyes [ ] eye irritation [ ] postnasal drip [ ] nasal congestion  CV: [ ] chest pain [ ] orthopnea [ ] palpitations [ ] murmur  Resp: [x ] cough [ x] shortness of breath [ ] dyspnea [ ] wheezing [ ] sputum [ ] hemoptysis  GI: [ ] nausea [ ] vomiting [ ] diarrhea [ ] constipation [ ] abd pain [ ] dysphagia   : [ ] dysuria [ ] nocturia [ ] hematuria [ ] increased urinary frequency  Musculoskeletal: [ ] back pain [ ] myalgias [ ] arthralgias [ ] fracture  Skin: [ ] rash [ ] itch  Neurological: [ ] headache [ ] dizziness [x ] syncope [ ] weakness [ ] numbness  Psychiatric: [ ] anxiety [ ] depression  Endocrine: [ ] diabetes [ ] thyroid problem  Hematologic/Lymphatic: [ ] anemia [ ] bleeding problem  Allergic/Immunologic: [ ] itchy eyes [ ] nasal discharge [ ] hives [ ] angioedema  [x ] All other systems negative

## 2021-03-17 NOTE — H&P ADULT - PROBLEM SELECTOR PLAN 1
Lightheadedness and fall to ground without trauma. Denies actual LOC. In the setting of urination and also COVID positive. Per patient hydrating well. Hypotensive upon arrival and improved after IVF. Then orthostatics appeared to be done after IVF? and neg. Likely vasovagal episode vs COVID vs anti-HTN medications  -monitor on tele  -POCUS Echo in ED without pericardial effusion and normal EF, ordered official TTE  -trops flat, EKG with Q waves in V1-V3, III  -PT eval  -left arm pain resolved after 15-20minutes. No chest pain

## 2021-03-17 NOTE — H&P ADULT - NSICDXFAMILYHX_GEN_ALL_CORE_FT
FAMILY HISTORY:  Father  Still living? No  Family history of myocardial infarction, Age at diagnosis: Age Unknown    Mother  Still living? No  Family history of Alzheimer's disease, Age at diagnosis: Age Unknown

## 2021-03-17 NOTE — H&P ADULT - NSHPLABSRESULTS_GEN_ALL_CORE
I have personally reviewed this patient's labs below:                        13.2   3.96  )-----------( 138      ( 17 Mar 2021 14:39 )             41.8     03-17-21 @ 14:39    141  |  105  |  22             --------------------------< 97     4.4  |  27  | 1.36<H>    eGFR AA: 57<L>  eGFR N-AA: 49<L>    Calcium: 9.1  Phosphorus: --  Magnesium: --    AST: 28    ALT: 22  AlkPhos: 75  Protein: 6.3  Albumin: 4.0  TBili: 0.3  D-Bili: --    Trop 39--39, probnp 216    PT/INR - ( 17 Mar 2021 14:39 )   PT: 13.5 sec;   INR: 1.19 ratio         PTT - ( 17 Mar 2021 14:39 )  PTT:28.8 sec    I have personally reviewed this patient's EKG and my independent interpretation is NSR, Q waves V1-V3, Q wave III, TWI lead I    I have personally reviewed this patient's CXR and my independent interpretation is clear lungs    COVID positive I have personally reviewed this patient's labs below:                        13.2   3.96  )-----------( 138      ( 17 Mar 2021 14:39 )             41.8     03-17-21 @ 14:39    141  |  105  |  22             --------------------------< 97     4.4  |  27  | 1.36<H>    eGFR AA: 57<L>  eGFR N-AA: 49<L>    Calcium: 9.1  Phosphorus: --  Magnesium: --    AST: 28    ALT: 22  AlkPhos: 75  Protein: 6.3  Albumin: 4.0  TBili: 0.3  D-Bili: --    Trop 39--39, probnp 216    PT/INR - ( 17 Mar 2021 14:39 )   PT: 13.5 sec;   INR: 1.19 ratio         PTT - ( 17 Mar 2021 14:39 )  PTT:28.8 sec    I have personally reviewed this patient's EKG and my independent interpretation is NSR, Q waves V1-V3, Q wave III, TWI lead I    I have personally reviewed this patient's CXR and my independent interpretation is clear lungs    COVID positive    CTH noncontrast  IMPRESSION:    No acute intracranial pathology is noted. If the patient has new and persistent symptoms, consider short interval follow-up head CT or brain MRI follow-up if there are no MRI contraindications.

## 2021-03-17 NOTE — ED ADULT NURSE NOTE - OBJECTIVE STATEMENT
80 y/o male arrives to E.D. rm 19 after "passing out after using the bathroom." Pt found by wife, denies hitting head, neg anticoagulant use. A&Ox4, ambulates w/ walker, denies SOB, endorses difficulty breathing while en route to hospital that resolved on its own, denies chest pain, endorses pain in left arm after syncopal episode, denies n/v/d, denies fever/cough/body aches. NSR on tele. Will continue to monitor.

## 2021-03-17 NOTE — H&P ADULT - ASSESSMENT
81M with PMHx HTN, GERD, HLD presenting with presyncopal episode this AM. Hypotensive in ED with improvement after IVF, orthostatics neg. COVID positive, no O2 requirement

## 2021-03-17 NOTE — H&P ADULT - HISTORY OF PRESENT ILLNESS
81M with PMHx HTN, GERD, HLD presenting with presyncopal episode this AM. Pt had COVID vaccine 2 weeks ago and yesterday began having coughing fit episodes. This AM while urinating standing up he began having lightheadedness as if everything was darkening around him. He slowly brought himself to the ground and lied staring up the ceiling for a few minutes. He was then able to ambulate and decided to come to the ED to get evaluated. He also had 15-20minutes of left arm pain without trauma which then resolved and hasn't occurred since. Denies LOC, head trauma, chest pain, fevers, chills. Endorsed an episode of heavy breathing after episode but has resolved. Currently he is without any complaints. Noted prior syncopal episode with sudden fall a couple years ago which he followed Dr. Florin Rosario (cardiologist) for and neg w/u. Takes losartan/HCTZ 100-12.5mg, lasix 20mg and BP is usually normal. Denies prior MI, CHF, arrhythmia.

## 2021-03-17 NOTE — ED ADULT NURSE NOTE - NSIMPLEMENTINTERV_GEN_ALL_ED
Implemented All Fall Risk Interventions:  [place] to call system. Call bell, personal items and telephone within reach. Instruct patient to call for assistance. Room bathroom lighting operational. Non-slip footwear when patient is off stretcher. Physically safe environment: no spills, clutter or unnecessary equipment. Stretcher in lowest position, wheels locked, appropriate side rails in place. Provide visual cue, wrist band, yellow gown, etc. Monitor gait and stability. Monitor for mental status changes and reorient to person, place, and time. Review medications for side effects contributing to fall risk. Reinforce activity limits and safety measures with patient and family.

## 2021-03-17 NOTE — H&P ADULT - PROBLEM SELECTOR PLAN 2
No O2 requirement, only sx is cough  -airborne/contact isolation  -lovenox 40mg qd  -Trop 39--39, probnp 216  -check D-dimer, CRP, procalcitonin, LDH, ferritin in AM  -monitor O2 status  -no remdesivir or decadron at this time  -CXR clear  -tylenol, cough suppressants

## 2021-03-17 NOTE — H&P ADULT - NSHPPHYSICALEXAM_GEN_ALL_CORE
PHYSICAL EXAM:  Vital Signs Last 24 Hrs  T(C): 36.7 (03-17-21 @ 17:24)  T(F): 98.1 (03-17-21 @ 17:24), Max: 98.1 (03-17-21 @ 17:24)  HR: 68 (03-17-21 @ 17:24) (68 - 85)  BP: 135/63 (03-17-21 @ 17:24)  BP(mean): --  RR: 20 (03-17-21 @ 17:24) (17 - 20)  SpO2: 100% (03-17-21 @ 17:24) (98% - 100%)  Wt(kg): --    Constitutional: NAD, awake and alert, well developed  EYES: EOMI, conjunctiva clear  ENT:  Normal Hearing, no tonsillar exudates   Neck: Soft and supple , no thyromegaly   Respiratory: Breath sounds are clear bilaterally, No wheezing, rales or rhonchi, no tachypnea, no accessory muscle use  Cardiovascular: S1 and S2, regular rate and rhythm, no Murmurs, gallops or rubs, no JVD, no leg edema  Gastrointestinal: Bowel Sounds present, soft, nontender, nondistended, no guarding, no rebound  Extremities: No cyanosis or clubbing; warm to touch  Vascular: 2+ peripheral pulses lower ex  Neurological: No focal deficits, CN II-XII intact bilaterally, sensation to light touch intact in all extremities. Pupils are equally reactive to light and symmetrical in size.   Musculoskeletal: 5/5 strength b/l upper and lower extremities; no joint swelling.  Skin: No rashes, no ulcerations

## 2021-03-17 NOTE — ED PROVIDER NOTE - ATTENDING CONTRIBUTION TO CARE
Attending note:   After face to face evaluation of this patient, I concur with above noted hx, pe, and care plan for this patient.  Sullivan: 81 yom with syncopal episode while in the bathroom today. Pt stated that he was standing while urinating and after finishing, he felt lightheaded and then woke up on floor. Pt did not hit any objects during fall. Pt had no cp, sob, nausea prior or after event. No previous episodes. Pt's physical exam is unremarkable as noted above. This may be micturation syncope but since patient had already finished urinating and was moving away from toilet, this is less likely, given his age, he should be admitted to tele and labs, CT, observed, possibly echo. Pt agrees to admission.

## 2021-03-17 NOTE — H&P ADULT - PROBLEM SELECTOR PLAN 3
BP 89/58 initially which could be from dehydration? vs vasovagal (though wouldn't expect to last as long vs anti-HTN medications  -orthostatics neg though appeared to be done after IVF  -hold losartan/HCTZ 100-12.5mg qd and lasix 20mg qd for now

## 2021-03-17 NOTE — ED PROVIDER NOTE - CLINICAL SUMMARY MEDICAL DECISION MAKING FREE TEXT BOX
82 yo M with PMHX of HTN on losartan 25mg (last taken last night) , HLD here s/p syncopal episode this morning at approx 730am, patient reports that he had finished urinating, and then woke up on the floor, staring up at ceiling. Girlfriend Josi was home at the time, but syncope was unwitnessed. Reports left arm pain after fall that has since resolved. patient also notes SOB that began at 10 am. and on ROS reports coughing fit last night for 5 mins, but no further coughing. patient had an episode like this 2 years ago, with negative cardiac workup as per patient  syncope/ hypotension.    ? electrolyte abn, vs dehydration vs cardiac, unlikely uti as no symptoms however will check ua  labs. trop, bnp, ua, cxr, ct head  orthostatics, gentle fluids, reassess.

## 2021-03-17 NOTE — ED PROVIDER NOTE - PROGRESS NOTE DETAILS
Patient labs, wth no gross abnormalities, trop 39,39, orthostatics wnl.   ekg nsr, no ischemic changes. cxr and ct head normal.   Spoke with Dr. Florin Rosario, patient cardiologist. agree with plan for admit for syncope and hypotension   Spoke with ALFRED Levine, Admit to [facility].

## 2021-03-17 NOTE — ED PROVIDER NOTE - OBJECTIVE STATEMENT
80 yo M with PMHX of HTN on losartan 25mg (last taken last night) , HLD here s/p syncopal episode this morning at approx 730am, patient reports that he had finished urinating, and then woke up on the floor, staring up at ceiling. Girlfriend Josi was home at the time, but syncope was unwitnessed. Reports left arm pain after fall that has since resolved. patient also notes SOB that began at 10 am. and on ROS reports coughing fit last night for 5 mins, but no further coughing. patient had an episode like this 2 years ago, with negative cardiac workup as per patient. Denies h/o CAD, CHF, Cardiac arrythmia. Denies CKD, renal dysfunction. Denies nausea, vomiting, diaphoresis, abdominal pain, diarrhea, incontinence, headache, dizziness, neck pain, back pain, weakness, numbness, tingling, urinary symptoms. Ambulatory after fall/ syncope, walks with cane.

## 2021-03-17 NOTE — HISTORY OF PRESENT ILLNESS
[FreeTextEntry1] : I saw Eddie Cr in the office today for cardiac follow up. He is an 81-year-old white male who I initially saw in  prior to back surgery. He had a chemical nuclear stress test  that was normal with an EF of 61%.The patient had back surgery  for spinal stenosis and what well. He is feeling better but still walking with a cane. Is having no chest pain or shortness of breath\par \par His past medical history is significant for hypertension and hyperlipidemia. He stopped smoking in . His father  of myocardial infarction at age 65. He has no diabetes.\par \par He had an echocardiogram  that showed an ejection fraction of 60-65% was stage I diastolic dysfunction. Carotid Doppler performed  showed fatty streaks. \par \par Work 3/20 demonstrated creatinine of 1.38. A1c 5.5. Cholesterol 146, triglycerides 205, HDL 27, and LDL 78.\par \par The patient walks with a cane because of balance issues. He is watching his carbohydrates and trying to lose weight. By my scale the patient has lost 1 pound.\par \par  he was standing and barbecuing. Apparently there was no awareness of getting dizzy or lightheaded. His girlfriend was with him but did not witness the event. He was found to be on the floor  He did not seek medical attention and since that time has felt well. That day he had nothing to eat between breakfast and dinner and did not drink any water or beverage the entire day. He was very dehydrated. He had a 24-hour Holter monitor that showed heart rates between 54 and 113 beats per minute, with an average rate of 70. There were 1057 VPCs, 187 APCs, and one 4 beat episode of SVT.\par \par ECG demonstrates non sinus atrial rhythm without acute change.\par

## 2021-03-17 NOTE — H&P ADULT - NSICDXPASTMEDICALHX_GEN_ALL_CORE_FT
PAST MEDICAL HISTORY:  Cataracts, bilateral     Disc disease, degenerative, lumbar or lumbosacral 2018    GERD (gastroesophageal reflux disease)     Hard of hearing b/l hearing aids    Hypercholesterolemia     Hypertension     Obesity     Snoring MARCOS precautions -- responds affirmatively to STOP BANG questionnaire

## 2021-03-17 NOTE — ED ADULT NURSE REASSESSMENT NOTE - NS ED NURSE REASSESS COMMENT FT1
Pt plan for possible admission. Pt currently resting in bed, NAD noted. Denies any complaints. NSR on tele. Will continue to monitor.

## 2021-03-17 NOTE — ED ADULT NURSE REASSESSMENT NOTE - NS ED NURSE REASSESS COMMENT FT1
Received report from JUWAN Morgan. Pt is A&Ox3, breathing even and unlabored. NSR. Pt picked up with transporter.

## 2021-03-18 LAB
ALBUMIN SERPL ELPH-MCNC: 3.6 G/DL — SIGNIFICANT CHANGE UP (ref 3.3–5)
ALP SERPL-CCNC: 71 U/L — SIGNIFICANT CHANGE UP (ref 40–120)
ALT FLD-CCNC: 18 U/L — SIGNIFICANT CHANGE UP (ref 4–41)
ANION GAP SERPL CALC-SCNC: 9 MMOL/L — SIGNIFICANT CHANGE UP (ref 7–14)
AST SERPL-CCNC: 25 U/L — SIGNIFICANT CHANGE UP (ref 4–40)
BASOPHILS # BLD AUTO: 0 K/UL — SIGNIFICANT CHANGE UP (ref 0–0.2)
BASOPHILS NFR BLD AUTO: 0 % — SIGNIFICANT CHANGE UP (ref 0–2)
BILIRUB SERPL-MCNC: 0.4 MG/DL — SIGNIFICANT CHANGE UP (ref 0.2–1.2)
BUN SERPL-MCNC: 17 MG/DL — SIGNIFICANT CHANGE UP (ref 7–23)
CALCIUM SERPL-MCNC: 8.6 MG/DL — SIGNIFICANT CHANGE UP (ref 8.4–10.5)
CHLORIDE SERPL-SCNC: 106 MMOL/L — SIGNIFICANT CHANGE UP (ref 98–107)
CO2 SERPL-SCNC: 26 MMOL/L — SIGNIFICANT CHANGE UP (ref 22–31)
CREAT SERPL-MCNC: 1.2 MG/DL — SIGNIFICANT CHANGE UP (ref 0.5–1.3)
CRP SERPL-MCNC: 5.9 MG/L — HIGH
D DIMER BLD IA.RAPID-MCNC: 195 NG/ML DDU — SIGNIFICANT CHANGE UP
EOSINOPHIL # BLD AUTO: 0.11 K/UL — SIGNIFICANT CHANGE UP (ref 0–0.5)
EOSINOPHIL NFR BLD AUTO: 2.7 % — SIGNIFICANT CHANGE UP (ref 0–6)
FERRITIN SERPL-MCNC: 130 NG/ML — SIGNIFICANT CHANGE UP (ref 30–400)
GLUCOSE SERPL-MCNC: 94 MG/DL — SIGNIFICANT CHANGE UP (ref 70–99)
HCT VFR BLD CALC: 37.5 % — LOW (ref 39–50)
HGB BLD-MCNC: 11.9 G/DL — LOW (ref 13–17)
LDH SERPL L TO P-CCNC: 177 U/L — SIGNIFICANT CHANGE UP (ref 135–225)
LYMPHOCYTES # BLD AUTO: 0.36 K/UL — LOW (ref 1–3.3)
LYMPHOCYTES # BLD AUTO: 9.1 % — LOW (ref 13–44)
MCHC RBC-ENTMCNC: 29.8 PG — SIGNIFICANT CHANGE UP (ref 27–34)
MCHC RBC-ENTMCNC: 31.7 GM/DL — LOW (ref 32–36)
MCV RBC AUTO: 94 FL — SIGNIFICANT CHANGE UP (ref 80–100)
MONOCYTES # BLD AUTO: 0.5 K/UL — SIGNIFICANT CHANGE UP (ref 0–0.9)
MONOCYTES NFR BLD AUTO: 12.7 % — SIGNIFICANT CHANGE UP (ref 2–14)
NEUTROPHILS # BLD AUTO: 2.67 K/UL — SIGNIFICANT CHANGE UP (ref 1.8–7.4)
NEUTROPHILS NFR BLD AUTO: 66.4 % — SIGNIFICANT CHANGE UP (ref 43–77)
NRBC # BLD: 0 /100 WBCS — SIGNIFICANT CHANGE UP
NRBC # FLD: 0 K/UL — SIGNIFICANT CHANGE UP
PLATELET # BLD AUTO: 115 K/UL — LOW (ref 150–400)
POTASSIUM SERPL-MCNC: 3.8 MMOL/L — SIGNIFICANT CHANGE UP (ref 3.5–5.3)
POTASSIUM SERPL-SCNC: 3.8 MMOL/L — SIGNIFICANT CHANGE UP (ref 3.5–5.3)
PROCALCITONIN SERPL-MCNC: 0.07 NG/ML — SIGNIFICANT CHANGE UP (ref 0.02–0.1)
PROT SERPL-MCNC: 5.9 G/DL — LOW (ref 6–8.3)
RBC # BLD: 3.99 M/UL — LOW (ref 4.2–5.8)
RBC # FLD: 13.7 % — SIGNIFICANT CHANGE UP (ref 10.3–14.5)
SODIUM SERPL-SCNC: 141 MMOL/L — SIGNIFICANT CHANGE UP (ref 135–145)
TSH SERPL-MCNC: 2.67 UIU/ML — SIGNIFICANT CHANGE UP (ref 0.27–4.2)
WBC # BLD: 4.37 K/UL — SIGNIFICANT CHANGE UP (ref 3.8–10.5)
WBC # FLD AUTO: 4.37 K/UL — SIGNIFICANT CHANGE UP (ref 3.8–10.5)

## 2021-03-18 RX ADMIN — ATORVASTATIN CALCIUM 20 MILLIGRAM(S): 80 TABLET, FILM COATED ORAL at 22:14

## 2021-03-18 RX ADMIN — Medication 2000 UNIT(S): at 13:37

## 2021-03-18 RX ADMIN — Medication 100 MILLIGRAM(S): at 22:14

## 2021-03-18 RX ADMIN — Medication 100 MILLIGRAM(S): at 11:42

## 2021-03-18 RX ADMIN — PANTOPRAZOLE SODIUM 40 MILLIGRAM(S): 20 TABLET, DELAYED RELEASE ORAL at 06:08

## 2021-03-18 RX ADMIN — Medication 100 MILLIGRAM(S): at 06:08

## 2021-03-18 NOTE — CONSULT NOTE ADULT - ATTENDING COMMENTS
Patient seen and examined.  Agree with above.   Admitted with syncope found to have COVID  TTE to eval LV function  EP eval appreciated  Treatment of COVID per medicine    Jacinda Carbajal MD

## 2021-03-18 NOTE — CONSULT NOTE ADULT - SUBJECTIVE AND OBJECTIVE BOX
HISTORY OF PRESENT ILLNESS: HPI:    81M with PMHx HTN, GERD, HLD presenting with presyncopal episode this AM. Pt had COVID vaccine 2 weeks ago and yesterday began having coughing fit episodes. This AM while urinating standing up he began having lightheadedness as if everything was darkening around him. He slowly brought himself to the ground and lied staring up the ceiling for a few minutes. He was then able to ambulate and decided to come to the ED to get evaluated. He also had 15-20minutes of left arm pain without trauma which then resolved and hasn't occurred since. Denies LOC, head trauma, chest pain, fevers, chills. Endorsed an episode of heavy breathing after episode but has resolved. Currently he is without any complaints. Noted prior syncopal episode with sudden fall a couple years ago which he followed Dr. Florin Rosario (cardiologist) for and neg w/u. Takes losartan/HCTZ 100-12.5mg, lasix 20mg and BP is usually normal. Denies prior MI, CHF, arrhythmia. (17 Mar 2021 20:59)      PAST MEDICAL & SURGICAL HISTORY:  Cataracts, bilateral    Hard of hearing  b/l hearing aids    Snoring  MARCOS precautions    Obesity    GERD (gastroesophageal reflux disease)    Disc disease, degenerative, lumbar or lumbosacral  2018    Hypercholesterolemia    Hypertension    Cataracts, both eyes  have lenses    Mass  excision of laryngeal mass    MEDICATIONS  (STANDING):  atorvastatin 20 milliGRAM(s) Oral at bedtime  benzonatate 100 milliGRAM(s) Oral three times a day  cholecalciferol 2000 Unit(s) Oral daily  enoxaparin Injectable 40 milliGRAM(s) SubCutaneous daily  pantoprazole    Tablet 40 milliGRAM(s) Oral before breakfast    Allergies  sulfa drugs (Other)      FAMILY HISTORY:  Family history of Alzheimer&#x27;s disease (Mother)  Family history of myocardial infarction (Father)    SOCIAL HISTORY:    [x ] Non-smoker  [ ] Smoker  [ ] Alcohol    FLU VACCINE THIS YEAR STARTS IN AUGUST:  [ ] Yes    [ ] No    IF OVER 65 HAVE YOU EVER HAD A PNA VACCINE:  [ ] Yes    [ ] No       [ ] N/A      REVIEW OF SYSTEMS:  [ ]chest pain  [  ]shortness of breath  [  ]palpitations  [ x ]syncope  [ ]near syncope [ ]upper extremity weakness   [ ] lower extremity weakness  [  ]diplopia  [  ]altered mental status   [  ]fevers  [ ]chills [ ]nausea  [ ]vomitting  [  ]dysphagia    [ ]abdominal pain  [ ]melena  [ ]BRBPR    [  ]epistaxis  [  ]rash    [ ]lower extremity edema        [ x] All others negative	  [ ] Unable to obtain      LABS:	 	    CARDIAC MARKERS:  Trop T 39, 39                            11.9   4.37  )-----------( 115      ( 18 Mar 2021 08:05 )             37.5     141  |  106  |  17  ----------------------------<  94  3.8   |  26  |  1.20    Ca    8.6      18 Mar 2021 08:05    TPro  5.9<L>  /  Alb  3.6  /  TBili  0.4  /  DBili  x   /  AST  25  /  ALT  18  /  AlkPhos  71  03-18    Creatinine Trend: 1.20<--, 1.36<--    TSH: Thyroid Stimulating Hormone, Serum: 2.67 uIU/mL (03-18 @ 08:05)      PHYSICAL EXAM:  T(C): 36.8 (03-18-21 @ 09:31), Max: 37.2 (03-17-21 @ 21:35)  HR: 78 (03-18-21 @ 09:31) (68 - 78)  BP: 111/83 (03-18-21 @ 09:31) (111/83 - 151/67)  RR: 18 (03-18-21 @ 09:31) (18 - 20)  SpO2: 98% (03-18-21 @ 09:31) (98% - 100%)  Wt(kg): --   BMI (kg/m2): 34.2 (03-17-21 @ 21:35)    Gen: Appears well in NAD  HEENT:  (-)icterus (-)pallor  CV: N S1 S2 1/6 OLIVE (+)2 Pulses B/l  Resp:  Clear to ausculatation B/L, normal effort  GI: (+) BS Soft, NT, ND  Lymph:  (-)Edema, (-)obvious lymphadenopathy  Skin: Warm to touch, Normal turgor  Psych: Appropriate mood and affect      ECG:  	< from: 12 Lead ECG (03.17.21 @ 11:54) >  Normal sinus rhythm  Anterior infarct , age undetermined    < end of copied text >    < from: CT Head No Cont (03.17.21 @ 17:51) >  IMPRESSION:    No acute intracranial pathology is noted. If the patient has new and persistent symptoms, consider short interval follow-up head CT or brain MRI follow-up if there are no MRI contraindications.    < end of copied text >      < from: Transthoracic Echocardiogram (08.21.18 @ 15:27) >  CONCLUSIONS:  1. Calcified trileaflet aortic valve with normal opening.  2. Endocardium not well visualized; grossly normal left  ventricular systolic function.  3. The right ventricle is not well visualized; grossly  normal right ventricular systolic function.  ------------------------------------------------------------------------  Confirmed on  8/21/2018 - 16:54:04 by Summer Troy M.D. [facility]    < end of copied text >      ASSESSMENT/PLAN: 	81y Male with PMHx HTN, GERD, HLD presenting with near syncope following urination and left arm pain.  His OP Cardiologist is Dr Rosario and per ER conversation with him, last work up in 2018 was normal.    --Pt found with COVID  --Indeterminate Trop T, would check TTE  --Ep eval appreciated  --OP Urology eval recommended  --supportive care per primary team         
EP Attending    HISTORY OF PRESENT ILLNESS: HPI:  81M with PMHx HTN, GERD, HLD presenting with presyncopal episode this AM. Pt had COVID vaccine 2 weeks ago and yesterday began having coughing fit episodes. This AM while urinating standing up he began having lightheadedness as if everything was darkening around him. He slowly brought himself to the ground and lied staring up the ceiling for a few minutes. He was then able to ambulate and decided to come to the ED to get evaluated. He also had 15-20minutes of left arm pain without trauma which then resolved and hasn't occurred since. Denies LOC, head trauma, chest pain, fevers, chills. Endorsed an episode of heavy breathing after episode but has resolved. Currently he is without any complaints. Noted prior syncopal episode with sudden fall a couple years ago which he followed Dr. Florin Rosario (cardiologist) for and neg w/u. Takes losartan/HCTZ 100-12.5mg, lasix 20mg and BP is usually normal. Denies prior MI, CHF, arrhythmia. (17 Mar 2021 20:59)    3/18- Mr Cr describes some mild LUTS symptoms (delayed emptying of bladder, dribbling/dripping at the end of urination) at baseline.  He had a near-fainting episode with loss of postural tone while standing to urinate.  No prior similar episodes. Has one prior fainting episode over a year ago while cooking over a hot grill.  Fortunately, no injuries occurred now or then.  There was no prodrome of nausea, hot/cold sweating or racing heartbeats or chest pain. He has no palpitations /lightheadedness / angina or fainting during exertion, when experiencing loud noises, and no shaking/seizure episodes while asleep.  A 10 pt ROS is otherwise negative.      PAST MEDICAL & SURGICAL HISTORY:  Cataracts, bilateral    Hard of hearing  b/l hearing aids    Snoring  MARCOS precautions -- responds affirmatively to STOP BANG questionnaire    Obesity    GERD (gastroesophageal reflux disease)    Disc disease, degenerative, lumbar or lumbosacral  2018    Hypercholesterolemia    Hypertension    Cataracts, both eyes  have lenses    Mass  excision of laryngeal mass    MEDICATIONS  (STANDING):  atorvastatin 20 milliGRAM(s) Oral at bedtime  benzonatate 100 milliGRAM(s) Oral three times a day  cholecalciferol 2000 Unit(s) Oral daily  enoxaparin Injectable 40 milliGRAM(s) SubCutaneous daily  pantoprazole    Tablet 40 milliGRAM(s) Oral before breakfast    Allergies    sulfa drugs (Other)    Intolerances    FAMILY HISTORY:  Family history of Alzheimer&#x27;s disease (Mother)    Family history of myocardial infarction (Father)    Non-contributary for premature coronary disease or sudden cardiac death    SOCIAL HISTORY:    [x ] Non-smoker  [ ] Smoker  [ ] Alcohol    PHYSICAL EXAM:  T(C): 36.8 (03-18-21 @ 09:31), Max: 37.2 (03-17-21 @ 21:35)  HR: 78 (03-18-21 @ 09:31) (68 - 78)  BP: 111/83 (03-18-21 @ 09:31) (111/83 - 151/67)  RR: 18 (03-18-21 @ 09:31) (17 - 20)  SpO2: 98% (03-18-21 @ 09:31) (98% - 100%)  Wt(kg): --    Appearance: well appearing elderly male in no acute distress, nondiaphoretic.  HEENT:   Normal oral mucosa, PERRL, EOMI	  Lymphatic: No lymphadenopathy , no edema  Cardiovascular: Normal S1 S2, No JVD, No murmurs , Peripheral pulses palpable 2+ bilaterally  Respiratory: Lungs clear to auscultation, normal effort , occasional coughing.  Gastrointestinal:  Soft, Non-tender, + BS	  Skin: No rashes, No ecchymoses, No cyanosis, warm to touch  Musculoskeletal: Normal range of motion, normal strength  Psychiatry:  Mood & affect appropriate    TELEMETRY: NSR 	    ECG: NSR, NO ANTERIOR MI, small r wave in V2 and V3.	  	  LABS:	 	                          11.9   4.37  )-----------( 115      ( 18 Mar 2021 08:05 )             37.5     03-18    141  |  106  |  17  ----------------------------<  94  3.8   |  26  |  1.20    Ca    8.6      18 Mar 2021 08:05    TPro  5.9<L>  /  Alb  3.6  /  TBili  0.4  /  DBili  x   /  AST  25  /  ALT  18  /  AlkPhos  71  03-18    TSH: Thyroid Stimulating Hormone, Serum: 2.67 uIU/mL (03-18 @ 08:05)    ASSESSMENT/PLAN: 	81y Male with syncope during/following micturition.    This fits typical reflex syncope, and no particular workup is required from an EP perspective.  To modify his risk for fainting in a potentially dangerous location (the bathroom), he should see a Urologist for evaluation of lower urinary tract symptoms, so he does not have to bear down as hard when urinating.      Juan Miguel Malave M.D.  Cardiac Electrophysiology    office [phone]  pager [phone]

## 2021-03-18 NOTE — PHYSICAL THERAPY INITIAL EVALUATION ADULT - PATIENT PROFILE REVIEW, REHAB EVAL
activity order- ambulate with cane/yes activity order- ambulate with cane, ok to perform out of bed ambulation as per RN Kellie Leahy/yes

## 2021-03-18 NOTE — PATIENT PROFILE ADULT - NSPROSPHOSPCHAPLAINYN_GEN_A_NUR
Quality 47: Advance Care Plan: Advance Care Planning discussed and documented; advance care plan or surrogate decision maker documented in the medical record. Detail Level: Detailed Quality 110: Preventive Care And Screening: Influenza Immunization: Influenza Immunization Administered during Influenza season Name And Contact Information For Health Care Proxy:  Quality 130: Documentation Of Current Medications In The Medical Record: Current Medications Documented Quality 111:Pneumonia Vaccination Status For Older Adults: Pneumococcal Vaccination Previously Received no

## 2021-03-18 NOTE — PHYSICAL THERAPY INITIAL EVALUATION ADULT - PERTINENT HX OF CURRENT PROBLEM, REHAB EVAL
This is an 81y M presenting with presyncopal episode, hypotensive in ED with improvement after IVF, orthostatics neg. COVID positive

## 2021-03-19 ENCOUNTER — TRANSCRIPTION ENCOUNTER (OUTPATIENT)
Age: 81
End: 2021-03-19

## 2021-03-19 VITALS
SYSTOLIC BLOOD PRESSURE: 130 MMHG | OXYGEN SATURATION: 95 % | DIASTOLIC BLOOD PRESSURE: 67 MMHG | RESPIRATION RATE: 18 BRPM | TEMPERATURE: 98 F | HEART RATE: 78 BPM

## 2021-03-19 LAB
ANION GAP SERPL CALC-SCNC: 8 MMOL/L — SIGNIFICANT CHANGE UP (ref 7–14)
BASOPHILS # BLD AUTO: 0.01 K/UL — SIGNIFICANT CHANGE UP (ref 0–0.2)
BASOPHILS NFR BLD AUTO: 0.2 % — SIGNIFICANT CHANGE UP (ref 0–2)
BUN SERPL-MCNC: 17 MG/DL — SIGNIFICANT CHANGE UP (ref 7–23)
CALCIUM SERPL-MCNC: 8.6 MG/DL — SIGNIFICANT CHANGE UP (ref 8.4–10.5)
CHLORIDE SERPL-SCNC: 107 MMOL/L — SIGNIFICANT CHANGE UP (ref 98–107)
CO2 SERPL-SCNC: 26 MMOL/L — SIGNIFICANT CHANGE UP (ref 22–31)
CREAT SERPL-MCNC: 1.18 MG/DL — SIGNIFICANT CHANGE UP (ref 0.5–1.3)
EOSINOPHIL # BLD AUTO: 0.13 K/UL — SIGNIFICANT CHANGE UP (ref 0–0.5)
EOSINOPHIL NFR BLD AUTO: 3 % — SIGNIFICANT CHANGE UP (ref 0–6)
GLUCOSE SERPL-MCNC: 100 MG/DL — HIGH (ref 70–99)
HCT VFR BLD CALC: 37.9 % — LOW (ref 39–50)
HGB BLD-MCNC: 12.1 G/DL — LOW (ref 13–17)
IANC: 2.79 K/UL — SIGNIFICANT CHANGE UP (ref 1.5–8.5)
IMM GRANULOCYTES NFR BLD AUTO: 0.5 % — SIGNIFICANT CHANGE UP (ref 0–1.5)
LYMPHOCYTES # BLD AUTO: 0.87 K/UL — LOW (ref 1–3.3)
LYMPHOCYTES # BLD AUTO: 19.8 % — SIGNIFICANT CHANGE UP (ref 13–44)
MCHC RBC-ENTMCNC: 30.6 PG — SIGNIFICANT CHANGE UP (ref 27–34)
MCHC RBC-ENTMCNC: 31.9 GM/DL — LOW (ref 32–36)
MCV RBC AUTO: 95.9 FL — SIGNIFICANT CHANGE UP (ref 80–100)
MONOCYTES # BLD AUTO: 0.57 K/UL — SIGNIFICANT CHANGE UP (ref 0–0.9)
MONOCYTES NFR BLD AUTO: 13 % — SIGNIFICANT CHANGE UP (ref 2–14)
NEUTROPHILS # BLD AUTO: 2.79 K/UL — SIGNIFICANT CHANGE UP (ref 1.8–7.4)
NEUTROPHILS NFR BLD AUTO: 63.5 % — SIGNIFICANT CHANGE UP (ref 43–77)
NRBC # BLD: 0 /100 WBCS — SIGNIFICANT CHANGE UP
NRBC # FLD: 0 K/UL — SIGNIFICANT CHANGE UP
PLATELET # BLD AUTO: 117 K/UL — LOW (ref 150–400)
POTASSIUM SERPL-MCNC: 4.2 MMOL/L — SIGNIFICANT CHANGE UP (ref 3.5–5.3)
POTASSIUM SERPL-SCNC: 4.2 MMOL/L — SIGNIFICANT CHANGE UP (ref 3.5–5.3)
RBC # BLD: 3.95 M/UL — LOW (ref 4.2–5.8)
RBC # FLD: 13.6 % — SIGNIFICANT CHANGE UP (ref 10.3–14.5)
SODIUM SERPL-SCNC: 141 MMOL/L — SIGNIFICANT CHANGE UP (ref 135–145)
WBC # BLD: 4.39 K/UL — SIGNIFICANT CHANGE UP (ref 3.8–10.5)
WBC # FLD AUTO: 4.39 K/UL — SIGNIFICANT CHANGE UP (ref 3.8–10.5)

## 2021-03-19 RX ORDER — LOSARTAN/HYDROCHLOROTHIAZIDE 100MG-25MG
1 TABLET ORAL
Qty: 0 | Refills: 0 | DISCHARGE

## 2021-03-19 RX ORDER — FUROSEMIDE 40 MG
1 TABLET ORAL
Qty: 0 | Refills: 0 | DISCHARGE

## 2021-03-19 RX ADMIN — ENOXAPARIN SODIUM 40 MILLIGRAM(S): 100 INJECTION SUBCUTANEOUS at 13:04

## 2021-03-19 RX ADMIN — PANTOPRAZOLE SODIUM 40 MILLIGRAM(S): 20 TABLET, DELAYED RELEASE ORAL at 05:03

## 2021-03-19 RX ADMIN — Medication 100 MILLIGRAM(S): at 05:03

## 2021-03-19 RX ADMIN — Medication 2000 UNIT(S): at 13:04

## 2021-03-19 RX ADMIN — Medication 100 MILLIGRAM(S): at 13:04

## 2021-03-19 NOTE — DISCHARGE NOTE PROVIDER - NSFOLLOWUPCLINICS_GEN_ALL_ED_FT
[facility]  Urology  [address] [place] [address] - [address] [place] [address]  Phone: [phone]  Fax:   Follow Up Time: 1 week

## 2021-03-19 NOTE — DISCHARGE NOTE PROVIDER - NSDCMRMEDTOKEN_GEN_ALL_CORE_FT
furosemide 20 mg oral tablet: 1 tab(s) orally once a day  losartan-hydrochlorothiazide 100mg-12.5mg oral tablet: 1 tab(s) orally once a day  omeprazole 40 mg oral delayed release capsule: 1 cap(s) orally once a day  rosuvastatin 5 mg oral tablet: 1 tab(s) orally once a day (at bedtime)  Vitamin D3 2000 intl units oral capsule: 1 cap(s) orally once a day   omeprazole 40 mg oral delayed release capsule: 1 cap(s) orally once a day  rosuvastatin 5 mg oral tablet: 1 tab(s) orally once a day (at bedtime)  Vitamin D3 2000 intl units oral capsule: 1 cap(s) orally once a day

## 2021-03-19 NOTE — PROGRESS NOTE ADULT - PROBLEM SELECTOR PLAN 1
Lightheadedness and fall to ground without trauma. Denies actual LOC. In the setting of urination and also COVID positive. Per patient hydrating well. Hypotensive upon arrival and improved after IVF. Then orthostatics appeared to be done after IVF? and neg. Likely vasovagal episode vs COVID vs anti-HTN medications  -monitor on tele  Delio EP renato appreciated.   Will get ID patient not a candidate for Remdesivir /Decadron.
Lightheadedness and fall to ground without trauma. Denies actual LOC. In the setting of urination and also COVID positive. Per patient hydrating well. Hypotensive upon arrival and improved after IVF. Then orthostatics appeared to be done after IVF? and neg. Likely vasovagal episode vs COVID vs anti-HTN medications  -monitor on tele  Delio EP renato appreciated.   Will get ID patient not a candidate for Remdesivir /Decadron.

## 2021-03-19 NOTE — PROGRESS NOTE ADULT - PROBLEM SELECTOR PLAN 3
BP 89/58 initially which could be from dehydration? vs vasovagal (though wouldn't expect to last as long vs anti-HTN medications  -orthostatics neg though appeared to be done after IVF  -hold losartan/HCTZ 100-12.5mg qd and lasix 20mg qd for now
BP 89/58 initially which could be from dehydration? vs vasovagal (though wouldn't expect to last as long vs anti-HTN medications  -orthostatics neg though appeared to be done after IVF  -hold losartan/HCTZ 100-12.5mg qd and lasix 20mg qd for now
Principal Discharge DX:	Abdominal pain

## 2021-03-19 NOTE — PROGRESS NOTE ADULT - PROBLEM SELECTOR PLAN 2
No O2 requirement, only sx is cough  -airborne/contact isolation  -lovenox 40mg qd  -Trop 39--39, probnp 216  -check D-dimer, CRP, procalcitonin, LDH, ferritin in AM  -monitor O2 status  -no remdesivir or decadron at this time  -CXR clear  -tylenol, cough suppressants
No O2 requirement, only sx is cough  -airborne/contact isolation  -lovenox 40mg qd  -Trop 39--39, probnp 216  -check D-dimer, CRP, procalcitonin, LDH, ferritin in AM  -monitor O2 status  -no remdesivir or decadron at this time  -CXR clear  -tylenol, cough suppressants

## 2021-03-19 NOTE — DISCHARGE NOTE NURSING/CASE MANAGEMENT/SOCIAL WORK - NSDCFUADDAPPT_GEN_ALL_CORE_FT
If you are in need of a general medicine physician and post-discharge medical follow-up for further care/recommendations you may contact the [facility] for an appointment [phone]

## 2021-03-19 NOTE — DISCHARGE NOTE PROVIDER - NSDCCPCAREPLAN_GEN_ALL_CORE_FT
PRINCIPAL DISCHARGE DIAGNOSIS  Diagnosis: 2019 novel coronavirus disease (COVID-19)  Assessment and Plan of Treatment: You have been diagnosed with the COVID-19 virus during your hospital stay. You must self quarantine to complete a 14 day time period.  Monitor for fevers, shortness of breath and cough primarily.  Monitor your temperature daily to not any changes and increases.    It has been determined that you no longer need hospitalization and can recover while remaining in self-quarantine at home. You should follow the prevention steps below until a healthcare provider or local or state health department says you can return to your normal activities.  1. You should restrict activities outside your home, except for getting medical care.  2. Do not go to work, school, or public areas.  3. Avoid using public transportation, ride-sharing, or taxis.  4. Separate yourself from other people and animals in your home.  People: As much as possible, you should stay in a specific room and away from other people in your home. Also, you should use a separate bathroom, if available.  Animals: You should restrict contact with pets and other animals while you are sick with COVID-19, just like you would around other people. Although there have not been reports of pets or other animals becoming sick with COVID-19, it is still recommended that people sick with COVID-19 limit contact with animals until more information is known about the virus.  When possible, have another member of your household care for your animals while you are sick. If you must care for your pet or be around animals while you are sick, wash your hands before and after you interact with pets and wear a facemask.  5. Call ahead before visiting your doctor.  If you have a medical appointment, call the healthcare provider and tell them that you have or may have COVID-19. This will help the healthcare provider’s office take steps to keep other people from getting infected or exposed.  6. Wear a facemask.  You should wear a facemask when you are around other people (e.g., sharing a room or vehicle) or pets and before you enter a healthcare pro      SECONDARY DISCHARGE DIAGNOSES  Diagnosis: Pre-syncope  Assessment and Plan of Treatment: You were admitted following a near syncopal event. Please follow up with your cardiologist after your hopsital discharge.    Diagnosis: Hypercholesterolemia  Assessment and Plan of Treatment: Continue cholesterol control medications. Continue DASH diet. Follow up with your PCP within 1 week of discharge for further management and monitoring of lipid and cholesterol panels.    Diagnosis: Hypertension, unspecified type  Assessment and Plan of Treatment: Continue blood pressure medication regimen as directed. Monitor for any visual changes, headaches or dizziness.  Monitor blood pressure regularly.  Follow up with your PCP for further management for high blood pressure.

## 2021-03-19 NOTE — PROGRESS NOTE ADULT - SUBJECTIVE AND OBJECTIVE BOX
EP Attending    HISTORY OF PRESENT ILLNESS: HPI:  81M with PMHx HTN, GERD, HLD presenting with presyncopal episode this AM. Pt had COVID vaccine 2 weeks ago and yesterday began having coughing fit episodes. This AM while urinating standing up he began having lightheadedness as if everything was darkening around him. He slowly brought himself to the ground and lied staring up the ceiling for a few minutes. He was then able to ambulate and decided to come to the ED to get evaluated. He also had 15-20minutes of left arm pain without trauma which then resolved and hasn't occurred since. Denies LOC, head trauma, chest pain, fevers, chills. Endorsed an episode of heavy breathing after episode but has resolved. Currently he is without any complaints. Noted prior syncopal episode with sudden fall a couple years ago which he followed Dr. Florin Rosario (cardiologist) for and neg w/u. Takes losartan/HCTZ 100-12.5mg, lasix 20mg and BP is usually normal. Denies prior MI, CHF, arrhythmia. (17 Mar 2021 20:59)    3/18- Mr Cr describes some mild LUTS symptoms (delayed emptying of bladder, dribbling/dripping at the end of urination) at baseline.  He had a near-fainting episode with loss of postural tone while standing to urinate.  No prior similar episodes. Has one prior fainting episode over a year ago while cooking over a hot grill.  Fortunately, no injuries occurred now or then.  There was no prodrome of nausea, hot/cold sweating or racing heartbeats or chest pain. He has no palpitations /lightheadedness / angina or fainting during exertion, when experiencing loud noises, and no shaking/seizure episodes while asleep.  A 10 pt ROS is otherwise negative.  3/19 -feeling well today, no angina orthopnea or PND, ambulating safely and able to use the bathroom w/o lightheadedness.  no complaints.  pending discharge home.    acetaminophen   Tablet .. 650 milliGRAM(s) Oral every 6 hours PRN  atorvastatin 20 milliGRAM(s) Oral at bedtime  benzonatate 100 milliGRAM(s) Oral three times a day  cholecalciferol 2000 Unit(s) Oral daily  enoxaparin Injectable 40 milliGRAM(s) SubCutaneous daily  pantoprazole    Tablet 40 milliGRAM(s) Oral before breakfast                          12.1   4.39  )-----------( 117      ( 19 Mar 2021 07:08 )             37.9       03-19    141  |  107  |  17  ----------------------------<  100<H>  4.2   |  26  |  1.18    Ca    8.6      19 Mar 2021 06:55    TPro  5.9<L>  /  Alb  3.6  /  TBili  0.4  /  DBili  x   /  AST  25  /  ALT  18  /  AlkPhos  71  03-18      T(C): 36.7 (03-19-21 @ 10:02), Max: 36.8 (03-19-21 @ 01:56)  HR: 78 (03-19-21 @ 10:02) (73 - 78)  BP: 130/67 (03-19-21 @ 10:02) (130/65 - 153/60)  RR: 18 (03-19-21 @ 10:02) (17 - 18)  SpO2: 95% (03-19-21 @ 10:02) (95% - 98%)  Wt(kg): --    I&O's Summary      Appearance: well appearing elderly male in no acute distress, nondiaphoretic.  HEENT:   Normal oral mucosa, PERRL, EOMI	  Lymphatic: No lymphadenopathy , no edema  Cardiovascular: Normal S1 S2, No JVD, No murmurs , Peripheral pulses palpable 2+ bilaterally  Respiratory: Lungs clear to auscultation, normal effort , occasional coughing.  Gastrointestinal:  Soft, Non-tender, + BS	  Skin: No rashes, No ecchymoses, No cyanosis, warm to touch  Musculoskeletal: Normal range of motion, normal strength  Psychiatry:  Mood & affect appropriate    TELEMETRY: NSR 	    ECG: NSR, NO ANTERIOR MI, small r wave in V2 and V3.	    ASSESSMENT/PLAN: 	81y Male with syncope during/following micturition.    Typical reflex syncope during micturition.  To modify his risk for fainting in a potentially dangerous location (the bathroom), he should see a Urologist for evaluation of lower urinary tract symptoms, so he does not have to bear down as hard when urinating.    No further workup from EP perspective, stable for discharge.    Juan Miguel Malave M.D.  Cardiac Electrophysiology    office [phone]  pager [phone]
Patient is a 81y old  Male who presents with a chief complaint of presyncope (18 Mar 2021 16:56)      SUBJECTIVE / OVERNIGHT EVENTS: no events over night.   Patient feels 0k.     T(C): 36.8 (03-18-21 @ 09:31), Max: 36.8 (03-18-21 @ 09:31)  HR: 78 (03-18-21 @ 09:31) (78 - 78)  BP: 111/83 (03-18-21 @ 09:31) (111/83 - 111/83)  RR: 18 (03-18-21 @ 09:31) (18 - 18)  SpO2: 98% (03-18-21 @ 09:31) (98% - 98%)      MEDICATIONS  (STANDING):  atorvastatin 20 milliGRAM(s) Oral at bedtime  benzonatate 100 milliGRAM(s) Oral three times a day  cholecalciferol 2000 Unit(s) Oral daily  enoxaparin Injectable 40 milliGRAM(s) SubCutaneous daily  pantoprazole    Tablet 40 milliGRAM(s) Oral before breakfast    MEDICATIONS  (PRN):  acetaminophen   Tablet .. 650 milliGRAM(s) Oral every 6 hours PRN Temp greater or equal to 38C (100.4F), Mild Pain (1 - 3)    PHYSICAL EXAM:  GENERAL: NAD, well-developed  HEAD:  Atraumatic, Normocephalic  EYES: EOMI, PERRLA, conjunctiva and sclera clear  NECK: Supple, No JVD  CHEST/LUNG: Clear to auscultation bilaterally; No wheeze  HEART: Regular rate and rhythm; No murmurs, rubs, or gallops  ABDOMEN: Soft, Nontender, Nondistended; Bowel sounds present  EXTREMITIES:  2+ Peripheral Pulses, No clubbing, cyanosis, or edema  PSYCH: AAOx3  NEUROLOGY: non-focal  SKIN: No rashes or lesions                           11.9   4.37  )-----------( 115      ( 18 Mar 2021 08:05 )             37.5           LIVER FUNCTIONS - ( 18 Mar 2021 08:05 )  Alb: 3.6 g/dL / Pro: 5.9 g/dL / ALK PHOS: 71 U/L / ALT: 18 U/L / AST: 25 U/L / GGT: x           PT/INR - ( 17 Mar 2021 14:39 )   PT: 13.5 sec;   INR: 1.19 ratio         PTT - ( 17 Mar 2021 14:39 )  PTT:28.8 sec  141|106|17<94  3.8|26|1.20  8.6,--,--  03-18 @ 08:05      CAPILLARY BLOOD GLUCOSE            RADIOLOGY & ADDITIONAL TESTS:    Imaging Personally Reviewed:    Consultant(s) Notes Reviewed:      Care Discussed with Consultants/Other Providers:  
Patient is a 81y old  Male who presents with a chief complaint of presyncope (18 Mar 2021 16:56)      SUBJECTIVE / OVERNIGHT EVENTS: no events over night.       d/c planning today     PHYSICAL EXAM:  GENERAL: NAD, well-developed  HEAD:  Atraumatic, Normocephalic  EYES: EOMI, PERRLA, conjunctiva and sclera clear  NECK: Supple, No JVD  CHEST/LUNG: Clear to auscultation bilaterally; No wheeze  HEART: Regular rate and rhythm; No murmurs, rubs, or gallops  ABDOMEN: Soft, Nontender, Nondistended; Bowel sounds present  EXTREMITIES:  2+ Peripheral Pulses, No clubbing, cyanosis, or edema  PSYCH: AAOx3  NEUROLOGY: non-focal  SKIN: No rashes or lesions                           11.9   4.37  )-----------( 115      ( 18 Mar 2021 08:05 )             37.5           LIVER FUNCTIONS - ( 18 Mar 2021 08:05 )  Alb: 3.6 g/dL / Pro: 5.9 g/dL / ALK PHOS: 71 U/L / ALT: 18 U/L / AST: 25 U/L / GGT: x           PT/INR - ( 17 Mar 2021 14:39 )   PT: 13.5 sec;   INR: 1.19 ratio         PTT - ( 17 Mar 2021 14:39 )  PTT:28.8 sec  141|106|17<94  3.8|26|1.20  8.6,--,--  03-18 @ 08:05      CAPILLARY BLOOD GLUCOSE            RADIOLOGY & ADDITIONAL TESTS:    Imaging Personally Reviewed:    Consultant(s) Notes Reviewed:      Care Discussed with Consultants/Other Providers:

## 2021-03-19 NOTE — DISCHARGE NOTE PROVIDER - PROVIDER TOKENS
FREE:[LAST:[PCP],PHONE:[(   )    -],FAX:[(   )    -],FOLLOWUP:[1 week]] FREE:[LAST:[PCP],PHONE:[(   )    -],FAX:[(   )    -],FOLLOWUP:[1 week]],FREE:[LAST:[Cardiology],PHONE:[(   )    -],FAX:[(   )    -],FOLLOWUP:[1 week]]

## 2021-03-19 NOTE — PROGRESS NOTE ADULT - ASSESSMENT
81M with PMHx HTN, GERD, HLD presenting with presyncopal episode this AM. Hypotensive in ED with improvement after IVF, orthostatics neg. COVID positive, no O2 requirement
81M with PMHx HTN, GERD, HLD presenting with presyncopal episode this AM. Hypotensive in ED with improvement after IVF, orthostatics neg. COVID positive, no O2 requirement

## 2021-03-19 NOTE — DISCHARGE NOTE NURSING/CASE MANAGEMENT/SOCIAL WORK - PATIENT PORTAL LINK FT
You can access the FollowMyHealth Patient Portal offered by [facility] by registering at the following website: [email or web address]. By joining [facility]’s FollowMyHealth portal, you will also be able to view your health information using other applications (apps) compatible with our system.

## 2021-03-19 NOTE — DISCHARGE NOTE PROVIDER - HOSPITAL COURSE
81M with PMHx HTN, GERD, HLD presenting with presyncopal episode. Hypotensive in ED with improvement after IVF, orthostatics neg. COVID positive, no O2 requirement    Pre-syncope.    Plan: Lightheadedness and fall to ground without trauma. Denies actual LOC. In the setting of urination and also COVID positive. Per patient hydrating well. Hypotensive upon arrival and improved after IVF. Then orthostatics appeared to be done after IVF? and neg. Likely vasovagal episode vs COVID vs anti-HTN medications  -monitor on tele  Cardiology, EP eval appreciated.     2019 novel coronavirus disease (COVID-19).    Plan: No O2 requirement, only sx is cough  -airborne/contact isolation  -lovenox 40mg qd  -monitor O2 status  -no remdesivir or decadron at this time  -CXR clear  -tylenol, cough suppressants.     Hypertension, unspecified type.    Plan: BP 89/58 initially which could be from dehydration? vs vasovagal (though wouldn't expect to last as long vs anti-HTN medications  -orthostatics neg though appeared to be done after IVF  -hold losartan/HCTZ 100-12.5mg qd and lasix 20mg qd for now.     Gastroesophageal reflux disease, unspecified whether esophagitis present.    Plan: PPI.     Hypercholesterolemia.    Plan: Rosuvastatin.     Need for prophylactic measure.   Plan: Lovenox 40mg qd  DASH/TLC diet  PT eval.   81M with PMHx HTN, GERD, HLD presenting with presyncopal episode. Hypotensive in ED with improvement after IVF, orthostatics neg. COVID positive, no O2 requirement    Pre-syncope.    Plan: Lightheadedness and fall to ground without trauma. Denies actual LOC. In the setting of urination and also COVID positive. Per patient hydrating well. Hypotensive upon arrival and improved after IVF. Then orthostatics appeared to be done after IVF? and neg. Likely vasovagal episode vs COVID vs anti-HTN medications  -monitor on tele  Cardiology, EP eval appreciated.     2019 novel coronavirus disease (COVID-19).    Plan: No O2 requirement, only sx is cough  -airborne/contact isolation  -lovenox 40mg qd  -monitor O2 status  -no remdesivir or decadron at this time  -CXR clear  -tylenol, cough suppressants.     Hypertension, unspecified type.    Plan: BP 89/58 initially which could be from dehydration? vs vasovagal (though wouldn't expect to last as long vs anti-HTN medications  -orthostatics neg though appeared to be done after IVF  -hold losartan/HCTZ 100-12.5mg qd and lasix 20mg qd for now.     Gastroesophageal reflux disease, unspecified whether esophagitis present.    Plan: PPI.     Hypercholesterolemia.    Plan: Rosuvastatin.     Need for prophylactic measure.   Plan: Lovenox 40mg qd  DASH/TLC diet  PT eval.    On 3/19/2021 this case was reviewed with Dr. Boudreaux, who agrees that the patient is medically stable for discharge. Prescriptions were sent to a mutually agreed upon pharmacy.

## 2021-03-19 NOTE — DISCHARGE NOTE PROVIDER - CARE PROVIDER_API CALL
PCP,   Phone: (   )    -  Fax: (   )    -  Follow Up Time: 1 week   PCP,   Phone: (   )    -  Fax: (   )    -  Follow Up Time: 1 week    Cardiology,   Phone: (   )    -  Fax: (   )    -  Follow Up Time: 1 week

## 2021-03-19 NOTE — PROGRESS NOTE ADULT - PROBLEM SELECTOR PROBLEM 4
Gastroesophageal reflux disease, unspecified whether esophagitis present
Gastroesophageal reflux disease, unspecified whether esophagitis present

## 2021-03-19 NOTE — DISCHARGE NOTE PROVIDER - NSDCFUADDAPPT_GEN_ALL_CORE_FT
If you are in need of a general medicine physician and post-discharge medical follow-up for further care/recommendations you may contact the [facility] for an appointment [phone]

## 2021-03-26 ENCOUNTER — TRANSCRIPTION ENCOUNTER (OUTPATIENT)
Age: 81
End: 2021-03-26

## 2021-03-31 ENCOUNTER — NON-APPOINTMENT (OUTPATIENT)
Age: 81
End: 2021-03-31

## 2021-03-31 ENCOUNTER — APPOINTMENT (OUTPATIENT)
Dept: CARDIOLOGY | Facility: CLINIC | Age: 81
End: 2021-03-31
Payer: MEDICARE

## 2021-03-31 VITALS
HEART RATE: 76 BPM | WEIGHT: 243 LBS | BODY MASS INDEX: 34.79 KG/M2 | SYSTOLIC BLOOD PRESSURE: 199 MMHG | OXYGEN SATURATION: 94 % | HEIGHT: 70 IN | DIASTOLIC BLOOD PRESSURE: 90 MMHG

## 2021-03-31 DIAGNOSIS — R55 SYNCOPE AND COLLAPSE: ICD-10-CM

## 2021-03-31 PROCEDURE — 99214 OFFICE O/P EST MOD 30 MIN: CPT

## 2021-03-31 PROCEDURE — 93000 ELECTROCARDIOGRAM COMPLETE: CPT

## 2021-03-31 NOTE — PHYSICAL EXAM
[General Appearance - Well Developed] : well developed [Normal Appearance] : normal appearance [Well Groomed] : well groomed [General Appearance - Well Nourished] : well nourished [No Deformities] : no deformities [General Appearance - In No Acute Distress] : no acute distress [Normal Conjunctiva] : the conjunctiva exhibited no abnormalities [Normal Oral Mucosa] : normal oral mucosa [Normal Jugular Venous A Waves Present] : normal jugular venous A waves present [Normal Jugular Venous V Waves Present] : normal jugular venous V waves present [No Jugular Venous Wilkes A Waves] : no jugular venous wilkes A waves [Respiration, Rhythm And Depth] : normal respiratory rhythm and effort [Exaggerated Use Of Accessory Muscles For Inspiration] : no accessory muscle use [Auscultation Breath Sounds / Voice Sounds] : lungs were clear to auscultation bilaterally [Bowel Sounds] : normal bowel sounds [Abdomen Soft] : soft [Abdomen Tenderness] : non-tender [Gait - Sufficient For Exercise Testing] : the gait was sufficient for exercise testing [Abnormal Walk] : normal gait [Nail Clubbing] : no clubbing of the fingernails [Cyanosis, Localized] : no localized cyanosis [Skin Color & Pigmentation] : normal skin color and pigmentation [] : no rash [Skin Turgor] : normal skin turgor [Oriented To Time, Place, And Person] : oriented to person, place, and time [Impaired Insight] : insight and judgment were intact [No Anxiety] : not feeling anxious [Normal Rate] : normal [Normal S2] : normal S2 [Normal S1] : normal S1 [No Murmur] : no murmurs heard [2+] : left 2+ [1+] : left 1+ [No Abnormalities] : the abdominal aorta was not enlarged and no bruit was heard [No Pitting Edema] : no pitting edema present [S3] : no S3 [S4] : no S4 [Right Carotid Bruit] : no bruit heard over the right carotid [Left Carotid Bruit] : no bruit heard over the left carotid [Right Femoral Bruit] : no bruit heard over the right femoral artery [Left Femoral Bruit] : no bruit heard over the left femoral artery

## 2021-03-31 NOTE — HISTORY OF PRESENT ILLNESS
[FreeTextEntry1] : I saw Eddie Cr in the office today for cardiac follow up. He is an 81-year-old white male who I initially saw in  prior to back surgery. He had a chemical nuclear stress test  that was normal with an EF of 61%.The patient had back surgery  for spinal stenosis and what well. He is feeling better but still walking with a cane. Is having no chest pain or shortness of breath\par \par His past medical history is significant for hypertension and hyperlipidemia. He stopped smoking in . His father  of myocardial infarction at age 65. He has no diabetes.\par \par He had an echocardiogram  that showed an ejection fraction of 60-65% was stage I diastolic dysfunction. Carotid Doppler performed  showed fatty streaks. \par \par Blood work  demonstrated a cholesterol 157, triglycerides 124, HDL 36, LDL 96( direct ).. Glucose 109 and A1C 5.6..\par \par The patient walks with a cane because of balance issues. He is watching his carbohydrates and trying to lose weight. By my scale the patient has lost 1 pound.\par \par The patient had been feeling well. Approximately 2 weeks ago while he was standing and barbecuing, or event. Apparently there was no awareness of getting dizzy or lightheaded. His girlfriend was with him but did not witness the event. He was found to be on the floor and a pallet and fallen backwards and hit his head. He was walked inside and next remembers waking up in a chair. He did not seek medical attention and since that time has felt well. That day he had nothing to eat between breakfast and dinner and did not drink any water or beverage the entire day. He was very dehydrated. The monitor performed  demonstrated heart rates between 54 and 113. Average 70. 1057 VPCs, 187 APCs, one couplet, one 4 beat SVT.\par \par Patient was recently admitted to [facility] 3/17/2020.  He had received a covered vaccine 2 weeks prior to the admission.  The day prior to the admission he had a coughing fit.  The day of admission while urinating he had a presyncopal event and went to the emergency room.  There was no loss of consciousness or head trauma.  Work-up was negative.  SANDRA showed normal LV and RV function.  CT scan of the head was negative.  He was seen by EP who felt this was post micturition syncope.  On 3/17 he did test positive for Covid.  He tested negative by [facility] this past Monday and was cleared.  Upon discharge from the hospital he stopped his blood pressure medication.  At home he has been cutting his medication in half.\par \par ECG demonstrates non sinus atrial rhythm without acute change.\par

## 2021-03-31 NOTE — DISCUSSION/SUMMARY
[FreeTextEntry1] : Patient is feeling well.  This was probably another vagal event.  Blood pressure is high despite taking half dose of blood pressure medication.  He has done well on this medicine in the past and will restart taking the full dose.  He has an appointment to see you in 2 weeks at which time you will measure his blood pressure.  If you do any blood work I would appreciate a copy.\par \par Louie his other medications as prescribed.  If he has any further problems or symptoms he would call me.  I did advise him to keep himself well-hydrated but to watch out for salt.  If he does get any symptoms of lightheadedness he should get his head down to avoid passing out.\par \par This was discussed with the patient including his hospital stay, and I answered all of his questions.  I will see him in 2 months.\par \par

## 2021-06-01 ENCOUNTER — APPOINTMENT (OUTPATIENT)
Dept: CARDIOLOGY | Facility: CLINIC | Age: 81
End: 2021-06-01

## 2021-09-02 ENCOUNTER — APPOINTMENT (OUTPATIENT)
Dept: CARDIOLOGY | Facility: CLINIC | Age: 81
End: 2021-09-02
Payer: MEDICARE

## 2021-09-02 VITALS
BODY MASS INDEX: 35.65 KG/M2 | OXYGEN SATURATION: 95 % | HEART RATE: 71 BPM | WEIGHT: 249 LBS | DIASTOLIC BLOOD PRESSURE: 80 MMHG | SYSTOLIC BLOOD PRESSURE: 164 MMHG | HEIGHT: 70 IN

## 2021-09-02 PROCEDURE — 99214 OFFICE O/P EST MOD 30 MIN: CPT

## 2021-09-02 NOTE — HISTORY OF PRESENT ILLNESS
[FreeTextEntry1] : I saw Eddie Cr in the office today for cardiac follow up. He is an 81-year-old white male who I initially saw in  prior to back surgery. He had a chemical nuclear stress test  that was normal with an EF of 61%.The patient had back surgery  for spinal stenosis and what well. He is feeling better but still walking with a cane. Is having no chest pain or shortness of breath\par \par His past medical history is significant for hypertension and hyperlipidemia. He stopped smoking in . His father  of myocardial infarction at age 65. He has no diabetes.\par \par He had an echocardiogram  that showed an ejection fraction of 60-65% was stage I diastolic dysfunction. Carotid Doppler performed  showed fatty streaks. \par \par Blood work  demonstrated a cholesterol 157, triglycerides 124, HDL 36, LDL 96( direct ).. Glucose 109 and A1C 5.6..\par \par The patient walks with a cane because of balance issues. He is watching his carbohydrates and trying to lose weight. By my scale the patient has lost 1 pound.\par \par The patient had been feeling well. Approximately 2 weeks ago while he was standing and barbecuing, or event. Apparently there was no awareness of getting dizzy or lightheaded. His girlfriend was with him but did not witness the event. He was found to be on the floor and a pallet and fallen backwards and hit his head. He was walked inside and next remembers waking up in a chair. He did not seek medical attention and since that time has felt well. That day he had nothing to eat between breakfast and dinner and did not drink any water or beverage the entire day. He was very dehydrated. The monitor performed  demonstrated heart rates between 54 and 113. Average 70. 1057 VPCs, 187 APCs, one couplet, one 4 beat SVT.\par \par Patient was  admitted to [facility] 3/17/2021.  He had received a covid vaccine 2 weeks prior to the admission.  The day prior to the admission he had a coughing fit.  The day of admission while urinating he had a presyncopal event and went to the emergency room.  There was no loss of consciousness or head trauma.  Work-up was negative.  SANDRA showed normal LV and RV function.  CT scan of the head was negative.  He was seen by EP who felt this was post micturition syncope.  On 3/17 he did test positive for Covid.  He tested negative by [facility] this past Monday and was cleared.  Upon discharge from the hospital he stopped his blood pressure medication.  \par \par \par

## 2021-09-02 NOTE — PHYSICAL EXAM
[Normal Appearance] : normal appearance [General Appearance - Well Developed] : well developed [Well Groomed] : well groomed [General Appearance - Well Nourished] : well nourished [No Deformities] : no deformities [General Appearance - In No Acute Distress] : no acute distress [Normal Conjunctiva] : the conjunctiva exhibited no abnormalities [Normal Oral Mucosa] : normal oral mucosa [Normal Jugular Venous A Waves Present] : normal jugular venous A waves present [Normal Jugular Venous V Waves Present] : normal jugular venous V waves present [No Jugular Venous Wilkes A Waves] : no jugular venous wilkes A waves [Respiration, Rhythm And Depth] : normal respiratory rhythm and effort [Exaggerated Use Of Accessory Muscles For Inspiration] : no accessory muscle use [Auscultation Breath Sounds / Voice Sounds] : lungs were clear to auscultation bilaterally [Bowel Sounds] : normal bowel sounds [Abdomen Soft] : soft [Abdomen Tenderness] : non-tender [Abnormal Walk] : normal gait [Gait - Sufficient For Exercise Testing] : the gait was sufficient for exercise testing [Nail Clubbing] : no clubbing of the fingernails [Cyanosis, Localized] : no localized cyanosis [Skin Turgor] : normal skin turgor [Skin Color & Pigmentation] : normal skin color and pigmentation [] : no rash [Oriented To Time, Place, And Person] : oriented to person, place, and time [Impaired Insight] : insight and judgment were intact [No Anxiety] : not feeling anxious [Normal Rate] : normal [Normal S1] : normal S1 [Normal S2] : normal S2 [No Murmur] : no murmurs heard [2+] : left 2+ [1+] : left 1+ [No Abnormalities] : the abdominal aorta was not enlarged and no bruit was heard [No Pitting Edema] : no pitting edema present [S3] : no S3 [S4] : no S4 [Right Carotid Bruit] : no bruit heard over the right carotid [Left Carotid Bruit] : no bruit heard over the left carotid [Right Femoral Bruit] : no bruit heard over the right femoral artery [Left Femoral Bruit] : no bruit heard over the left femoral artery

## 2021-09-02 NOTE — REVIEW OF SYSTEMS
[Weight Gain (___ Lbs)] : [unfilled] ~Ulb weight gain [Joint Pain] : joint pain [Knee Pain] : knee pain [Lower Back Pain] : lower back pain [Rash] : no rash [Memory Lapses Or Loss] : memory lapses or loss [Depression] : no depression [Anxiety] : no anxiety [Easy Bleeding] : no tendency for easy bleeding [Easy Bruising] : no tendency for easy bruising [Negative] : Genitourinary

## 2022-03-03 ENCOUNTER — APPOINTMENT (OUTPATIENT)
Dept: CARDIOLOGY | Facility: CLINIC | Age: 82
End: 2022-03-03
Payer: MEDICARE

## 2022-03-03 ENCOUNTER — NON-APPOINTMENT (OUTPATIENT)
Age: 82
End: 2022-03-03

## 2022-03-03 VITALS
OXYGEN SATURATION: 96 % | HEIGHT: 70 IN | BODY MASS INDEX: 35.79 KG/M2 | DIASTOLIC BLOOD PRESSURE: 94 MMHG | HEART RATE: 71 BPM | SYSTOLIC BLOOD PRESSURE: 183 MMHG | WEIGHT: 250 LBS

## 2022-03-03 DIAGNOSIS — R09.89 OTHER SPECIFIED SYMPTOMS AND SIGNS INVOLVING THE CIRCULATORY AND RESPIRATORY SYSTEMS: ICD-10-CM

## 2022-03-03 PROCEDURE — 99214 OFFICE O/P EST MOD 30 MIN: CPT

## 2022-03-03 PROCEDURE — 93000 ELECTROCARDIOGRAM COMPLETE: CPT

## 2022-03-03 NOTE — DISCUSSION/SUMMARY
[FreeTextEntry1] : Clinically the patient is doing well.  He is wearing support stockings for his leg edema and has not needed any Lasix.  Blood pressure is controlled.  He has been unable to lose weight.  We discussed the importance of regular exercise and he will start trying to do some walking every day.  If he does have exertional symptoms he will let me know.\par \par He will stay on his present medication.  Will come back for a carotid Doppler and echocardiogram.  I would appreciate a copy of any recent blood work.  Otherwise the patient will come here to get his blood work taken.\par \par Once I get these test back I will discuss them with the patient.  If all is well we would see him in 6 months.  We did go over all of his medications, his lifestyle, and I answered all of his questions.

## 2022-03-03 NOTE — HISTORY OF PRESENT ILLNESS
[FreeTextEntry1] : I saw Eddie Cr in the office today for cardiac follow up. He is an 82-year-old white male who I initially saw in  prior to back surgery. He had a chemical nuclear stress test  that was normal with an EF of 61%.The patient had back surgery  for spinal stenosis and what well. He is feeling better but still walking with a cane. Is having no chest pain or shortness of breath\par \par His past medical history is significant for hypertension and hyperlipidemia. He stopped smoking in . His father  of myocardial infarction at age 65. He has no diabetes.\par \par He had an echocardiogram  that showed an ejection fraction of 60-65% was stage I diastolic dysfunction. Carotid Doppler performed  showed fatty streaks. \par \par Blood work  demonstrated a cholesterol 157, triglycerides 124, HDL 36, LDL 96( direct ).. Glucose 109 and A1C 5.6..\par \par The patient walks with a cane because of balance issues. He is watching his carbohydrates and trying to lose weight. By my scale the patient has lost 1 pound.\par \par The patient had been feeling well. While he was standing and barbecuing had a syncopal event. Apparently there was no awareness of getting dizzy or lightheaded. His girlfriend was with him but did not witness the event. He was found to be on the floor.  He had fallen backwards and hit his head. He was walked inside and next remembers waking up in a chair. He did not seek medical attention and since that time he felt well. That day he had nothing to eat between breakfast and dinner and did not drink any water or beverage the entire day. He was very dehydrated. The monitor performed  demonstrated heart rates between 54 and 113. Average 70. 1057 VPCs, 187 APCs, one couplet, one 4 beat SVT.\par \par Patient was  admitted to [facility] 3/17/2021.  He had received a covid vaccine 2 weeks prior to the admission.  The day prior to the admission he had a coughing fit.  The day of admission while urinating he had a presyncopal event and went to the emergency room.  There was no loss of consciousness or head trauma.  Work-up was negative.  SANDRA showed normal LV and RV function.  CT scan of the head was negative.  He was seen by EP who felt this was post micturition syncope.  On 3/17 he did test positive for Covid.  He tested negative by [facility]  and was cleared.  Upon discharge from the hospital he stopped his blood pressure medication. \par \par Patient is still walking with a cane.  He has had no more syncopal events.  He is now in physical therapy.  He has no chest pain or shortness of breath.  He has been unable to lose weight.\par \par ECG demonstrates sinus rhythm with poor R wave in leads V1 through V3 which probably represents lead placement.  Otherwise no change.\par \par \par

## 2022-03-03 NOTE — REVIEW OF SYSTEMS
[Weight Gain (___ Lbs)] : [unfilled] ~Ulb weight gain [Joint Pain] : joint pain [Lower Back Pain] : lower back pain [Knee Pain] : knee pain [Memory Lapses Or Loss] : memory lapses or loss [Negative] : Genitourinary [Rash] : no rash [Depression] : no depression [Anxiety] : no anxiety [Easy Bleeding] : no tendency for easy bleeding [Easy Bruising] : no tendency for easy bruising

## 2022-03-14 ENCOUNTER — MED ADMIN CHARGE (OUTPATIENT)
Age: 82
End: 2022-03-14

## 2022-03-14 ENCOUNTER — APPOINTMENT (OUTPATIENT)
Dept: CARDIOLOGY | Facility: CLINIC | Age: 82
End: 2022-03-14
Payer: MEDICARE

## 2022-03-14 PROCEDURE — 93306 TTE W/DOPPLER COMPLETE: CPT

## 2022-03-14 PROCEDURE — 93880 EXTRACRANIAL BILAT STUDY: CPT

## 2022-03-14 RX ORDER — PERFLUTREN 6.52 MG/ML
6.52 INJECTION, SUSPENSION INTRAVENOUS
Qty: 2 | Refills: 0 | Status: COMPLETED | OUTPATIENT
Start: 2022-03-14

## 2022-03-14 RX ADMIN — PERFLUTREN MG/ML: 6.52 INJECTION, SUSPENSION INTRAVENOUS at 00:00

## 2022-04-08 NOTE — ED PROVIDER NOTE - CROS ED GI ALL NEG
Spoke with patient regarding not getting a result of the A1C run in the office. Offered to have patient come back to the office to have it re-run or to send an order to the lab. Patient stated that she will come back not today but next week to have it re-done. Patient will call when she is on her way to the clinic.    negative...

## 2022-05-24 ENCOUNTER — RESULT REVIEW (OUTPATIENT)
Age: 82
End: 2022-05-24

## 2022-05-24 ENCOUNTER — APPOINTMENT (OUTPATIENT)
Dept: ORTHOPEDIC SURGERY | Facility: CLINIC | Age: 82
End: 2022-05-24
Payer: MEDICARE

## 2022-05-24 VITALS — WEIGHT: 250 LBS | HEIGHT: 70 IN | BODY MASS INDEX: 35.79 KG/M2

## 2022-05-24 DIAGNOSIS — S70.11XA CONTUSION OF RIGHT HIP, INITIAL ENCOUNTER: ICD-10-CM

## 2022-05-24 DIAGNOSIS — S70.01XA CONTUSION OF RIGHT HIP, INITIAL ENCOUNTER: ICD-10-CM

## 2022-05-24 PROCEDURE — 73562 X-RAY EXAM OF KNEE 3: CPT | Mod: 50

## 2022-05-24 PROCEDURE — 73552 X-RAY EXAM OF FEMUR 2/>: CPT | Mod: RT

## 2022-05-24 PROCEDURE — 99214 OFFICE O/P EST MOD 30 MIN: CPT

## 2022-05-24 PROCEDURE — 73502 X-RAY EXAM HIP UNI 2-3 VIEWS: CPT

## 2022-05-24 NOTE — HISTORY OF PRESENT ILLNESS
[Gradual] : gradual [4] : 4 [Dull/Aching] : dull/aching [Intermittent] : intermittent [Household chores] : household chores [Rest] : rest [Injection therapy] : injection therapy [Retired] : Work status: retired [de-identified] : The patient has continued pain in both knees.  He has mild to moderate pain standing and walking.  Pain after sitting and getting up.  Continued balance issues.  He fell 2 weeks ago and bruised his right thigh.  He says he does not have any significant pain and has right hip or thigh when standing or walking.  He does have ecchymosis lateral thigh and swelling right lower leg.  He did have good improvement after previous GenVisc injections in both knees. [] : Post Surgical Visit: no [de-identified] : 12/21/2021 [de-identified] : Dr. Brooks

## 2022-05-24 NOTE — DATA REVIEWED
[Venous Doppler] : A Venous Doppler test was completed of the [Right] : right [Negative] : negative [FreeTextEntry1] : Venous Doppler done at Dr. Talavera 5/24 2022 reported as negative for DVT.  No popliteal cyst.  Subcutaneous edema is noted

## 2022-05-24 NOTE — PHYSICAL EXAM
[Normal Mood and Affect] : normal mood and affect [Able to Communicate] : able to communicate [Well Developed] : well developed [Well Nourished] : well nourished [Right] : right hip [AP] : anteroposterior [Lateral] : lateral [4___] : quadriceps 4[unfilled]/5 [Bilateral] : foot and ankle bilaterally [absent] : posterior tibialis pulse: absent [FreeTextEntry8] : Mild diffuse tenderness lateral thigh.  No palpable fluctuance [de-identified] : Mild dystrophic gait [FreeTextEntry9] : Reviewed and interpreted.  Right knee AP standing, lateral, sunrise views-severe degenerative changes with narrowing the medial compartment on AP standing view, spurring patellofemoral joint\par \par Reviewed and interpreted.  Left knee AP standing, lateral, sunrise views-moderate degenerative changes of the patellofemoral joint.  Mild to moderate degenerative changes of the medial and lateral compartments [TWNoteComboBox7] : flexion 120 degrees [de-identified] : extension 0 degrees [] : no calf tenderness [FreeTextEntry3] : Mild diffuse swelling right leg compared to the left [de-identified] : The feet are warm with good capillary refill bilaterally

## 2022-05-24 NOTE — DISCUSSION/SUMMARY
[de-identified] : The patient will continue PT and [facility] in [place] with Meche\par Ice p.r.n.\par Tylenol p.r.n.\par Because of swelling right lower extremity, the patient will have venous Doppler today to rule out DVT.  \par Continue vascular followup for PVD with Dr. Jones\par \par Impression:\par Status post fall with contusion right thigh\par Severe osteoarthritis right knee\par Mild to moderate osteoarthritis left knee\par Peripheral vascular disease\par Balance disorder

## 2022-06-07 PROBLEM — I10 HYPERTENSION: Status: ACTIVE | Noted: 2018-08-07

## 2022-06-07 PROBLEM — R73.9 HYPERGLYCEMIA: Status: ACTIVE | Noted: 2019-12-19

## 2022-06-07 PROBLEM — E78.5 HYPERLIPIDEMIA: Status: ACTIVE | Noted: 2018-08-07

## 2022-06-08 ENCOUNTER — APPOINTMENT (OUTPATIENT)
Dept: CARDIOLOGY | Facility: CLINIC | Age: 82
End: 2022-06-08
Payer: MEDICARE

## 2022-06-08 VITALS
WEIGHT: 252 LBS | DIASTOLIC BLOOD PRESSURE: 80 MMHG | BODY MASS INDEX: 36.08 KG/M2 | OXYGEN SATURATION: 94 % | SYSTOLIC BLOOD PRESSURE: 175 MMHG | HEIGHT: 70 IN | HEART RATE: 73 BPM

## 2022-06-08 DIAGNOSIS — I10 ESSENTIAL (PRIMARY) HYPERTENSION: ICD-10-CM

## 2022-06-08 DIAGNOSIS — R73.9 HYPERGLYCEMIA, UNSPECIFIED: ICD-10-CM

## 2022-06-08 DIAGNOSIS — E78.5 HYPERLIPIDEMIA, UNSPECIFIED: ICD-10-CM

## 2022-06-08 PROCEDURE — 99214 OFFICE O/P EST MOD 30 MIN: CPT

## 2022-06-08 NOTE — DISCUSSION/SUMMARY
[FreeTextEntry1] : The patient is doing well.  Blood pressure is well controlled.  Unfortunately he is still not able to lose weight.  His echo and carotid Doppler was stable.  He is having no chest pain or shortness of breath.\par \par Went over his medication, and his cardiac testing, and I answered all of his questions.  I gave him a prescription for torsemide to use as needed for his leg edema.  He does use support stockings.\par \par I would appreciate a copy of his most recent blood work for my review.  He will stay on his present medication.  If all is well we will see him in 4 months

## 2022-06-08 NOTE — PHYSICAL EXAM
[General Appearance - Well Developed] : well developed [Well Groomed] : well groomed [Normal Appearance] : normal appearance [General Appearance - Well Nourished] : well nourished [No Deformities] : no deformities [General Appearance - In No Acute Distress] : no acute distress [Normal Conjunctiva] : the conjunctiva exhibited no abnormalities [Normal Oral Mucosa] : normal oral mucosa [Normal Jugular Venous A Waves Present] : normal jugular venous A waves present [Normal Jugular Venous V Waves Present] : normal jugular venous V waves present [No Jugular Venous Wilkes A Waves] : no jugular venous wilkes A waves [Respiration, Rhythm And Depth] : normal respiratory rhythm and effort [Exaggerated Use Of Accessory Muscles For Inspiration] : no accessory muscle use [Auscultation Breath Sounds / Voice Sounds] : lungs were clear to auscultation bilaterally [Bowel Sounds] : normal bowel sounds [Abdomen Soft] : soft [Abdomen Tenderness] : non-tender [Abnormal Walk] : normal gait [Gait - Sufficient For Exercise Testing] : the gait was sufficient for exercise testing [Nail Clubbing] : no clubbing of the fingernails [Cyanosis, Localized] : no localized cyanosis [Skin Color & Pigmentation] : normal skin color and pigmentation [Skin Turgor] : normal skin turgor [] : no rash [Oriented To Time, Place, And Person] : oriented to person, place, and time [Impaired Insight] : insight and judgment were intact [No Anxiety] : not feeling anxious [Normal Rate] : normal [Normal S1] : normal S1 [Normal S2] : normal S2 [No Murmur] : no murmurs heard [2+] : left 2+ [1+] : left 1+ [No Abnormalities] : the abdominal aorta was not enlarged and no bruit was heard [No Pitting Edema] : no pitting edema present [S3] : no S3 [S4] : no S4 [Right Carotid Bruit] : no bruit heard over the right carotid [Left Carotid Bruit] : no bruit heard over the left carotid [Right Femoral Bruit] : no bruit heard over the right femoral artery [Left Femoral Bruit] : no bruit heard over the left femoral artery

## 2022-06-08 NOTE — HISTORY OF PRESENT ILLNESS
[FreeTextEntry1] : I saw Eddie Cr in the office today for cardiac follow up. He is an 82-year-old white male who I initially saw in  prior to back surgery. He had a chemical nuclear stress test  that was normal with an EF of 61%.The patient had back surgery  for spinal stenosis and what well. He is feeling better but still walking with a cane. Is having no chest pain or shortness of breath\par \par His past medical history is significant for hypertension and hyperlipidemia. He stopped smoking in . His father  of myocardial infarction at age 65. He has no diabetes.\par \par He had an echocardiogram 3/22 that showed an ejection fraction of 55%. Basal septum 1.6 cm. Carotid Doppler performed 3/22 showed mild plaque. \par \par Blood work  demonstrated a cholesterol 157, triglycerides 124, HDL 36, LDL 96( direct  ),  glucose 109 and A1C 5.6..\par \par The patient walks with a cane because of balance issues. He is watching his carbohydrates and trying to lose weight. By my scale the patient has lost 1 pound.\par \par The patient had been feeling well. While he was standing and barbecuing had a syncopal event. Apparently there was no awareness of getting dizzy or lightheaded. His girlfriend was with him but did not witness the event. He was found to be on the floor.  He had fallen backwards and hit his head. He was walked inside and next remembers waking up in a chair. He did not seek medical attention and since that time he felt well. That day he had nothing to eat between breakfast and dinner and did not drink any water or beverage the entire day. He was very dehydrated. The monitor performed  demonstrated heart rates between 54 and 113. Average 70. 1057 VPCs, 187 APCs, one couplet, one 4 beat SVT.\par \par Patient was  admitted to [facility] 3/17/2021.  He had received a covid vaccine 2 weeks prior to the admission.  The day prior to the admission he had a coughing fit.  The day of admission while urinating he had a presyncopal event and went to the emergency room.  There was no loss of consciousness or head trauma.  Work-up was negative.  SANDRA showed normal LV and RV function.  CT scan of the head was negative.  He was seen by EP who felt this was post micturition syncope.  On 3/17 he did test positive for Covid.  He tested negative by [facility]  and was cleared.  Upon discharge from the hospital he stopped his blood pressure medication. \par \par Patient is still walking with a cane.  He has had no more syncopal events.  He is now in physical therapy.  He has no chest pain or shortness of breath.  He has been unable to lose weight.\par \par About a month ago the patient did have an accidental fall and hurt his legs but did not break anything.  He has a slight increase in his leg edema.  He had an ultrasound there was no DVT.  He had run out of the furosemide that was using on an as-needed basis.\par \par \par

## 2022-06-08 NOTE — REVIEW OF SYSTEMS
[Weight Gain (___ Lbs)] : [unfilled] ~Ulb weight gain [Joint Pain] : joint pain [Knee Pain] : knee pain [Lower Back Pain] : lower back pain [Memory Lapses Or Loss] : memory lapses or loss [Negative] : Genitourinary [Rash] : no rash [Depression] : no depression [Anxiety] : no anxiety [Easy Bleeding] : no tendency for easy bleeding [Easy Bruising] : no tendency for easy bruising

## 2022-08-08 ENCOUNTER — NON-APPOINTMENT (OUTPATIENT)
Age: 82
End: 2022-08-08

## 2022-08-11 NOTE — PHYSICAL THERAPY INITIAL EVALUATION ADULT - FUNCTIONAL LIMITATIONS, PT EVAL
Do not drive or operate machinery/Do not make important decisions/No heavy lifting/straining self-care

## 2022-08-23 ENCOUNTER — APPOINTMENT (OUTPATIENT)
Dept: ORTHOPEDIC SURGERY | Facility: CLINIC | Age: 82
End: 2022-08-23

## 2022-08-23 VITALS — WEIGHT: 252 LBS | HEIGHT: 70 IN | BODY MASS INDEX: 36.08 KG/M2

## 2022-08-23 PROCEDURE — 20611 DRAIN/INJ JOINT/BURSA W/US: CPT | Mod: 50

## 2022-08-23 RX ORDER — HYALURONATE SODIUM 10 MG/ML
25 SYRINGE (ML) INTRAARTICULAR
Refills: 0 | Status: COMPLETED | OUTPATIENT
Start: 2022-08-23

## 2022-08-23 RX ADMIN — Medication MG/2.5ML: at 00:00

## 2022-08-23 NOTE — PROCEDURE
[Large Joint Injection] : Large joint injection [Bilateral] : bilaterally of the [Knee] : knee [Pain] : pain [Alcohol] : alcohol [Betadine] : betadine [Ethyl Chloride sprayed topically] : ethyl chloride sprayed topically [Sterile technique used] : sterile technique used [Other: ____] : [unfilled] [#1] : series #1 [] : Patient tolerated procedure well [Patient was advised to rest the joint(s) for ____ days] : patient was advised to rest the joint(s) for [unfilled] days [Risks, benefits, alternatives discussed / Verbal consent obtained] : the risks benefits, and alternatives have been discussed, and verbal consent was obtained [Altered anatomic landmarks d/t erosive arthritis] : altered anatomic landmarks d/t erosive arthritis [All ultrasound images have been permanently captured and stored accordingly in our picture archiving and communication system] : All ultrasound images have been permanently captured and stored accordingly in our picture archiving and communication system [FreeTextEntry3] : Do not submerge underwater for 24 hours

## 2022-08-23 NOTE — DISCUSSION/SUMMARY
[de-identified] : The patient will continue PT and [facility] in [place] with Meche\par Ice p.r.n.\par Tylenol p.r.n.\par Continue vascular followup for PVD with Dr. Jones\par I discussed possible total knee replacements.  He will think about this\par \par Impression:\par Severe osteoarthritis right knee\par Mild to moderate osteoarthritis left knee\par Peripheral vascular disease\par Balance disorder

## 2022-08-23 NOTE — IMAGING
[de-identified] : The patient ambulates some mild unsteady gait using a cane\par \par Right knee\par No swelling\par Mild medial facet and joint line tenderness\par Passive range of motion 0° to 120°\par \par Left knee\par No swelling\par Mild medial facet and joint line tenderness\par Passive range of motion 0° to 120°\par \par Both legs\par No swelling\par Calves are soft and nontender

## 2022-08-23 NOTE — HISTORY OF PRESENT ILLNESS
[Rest] : rest [Injection therapy] : injection therapy [Stairs] : stairs [Retired] : Work status: retired [1] : 1 [Other:____] : [unfilled] [de-identified] : The patient has continued mild to moderate pain in both knees standing and walking.  Pain after sitting and getting up.  Continued balance issues secondary to peripheral neuropathy [] : Post Surgical Visit: no

## 2022-08-31 ENCOUNTER — APPOINTMENT (OUTPATIENT)
Dept: ORTHOPEDIC SURGERY | Facility: CLINIC | Age: 82
End: 2022-08-31

## 2022-08-31 VITALS — BODY MASS INDEX: 36.08 KG/M2 | WEIGHT: 252 LBS | HEIGHT: 70 IN

## 2022-08-31 PROCEDURE — 20611 DRAIN/INJ JOINT/BURSA W/US: CPT | Mod: 50

## 2022-08-31 RX ORDER — HYALURONATE SODIUM 10 MG/ML
25 SYRINGE (ML) INTRAARTICULAR
Refills: 0 | Status: COMPLETED | OUTPATIENT
Start: 2022-08-31

## 2022-08-31 RX ADMIN — Medication MG/2.5ML: at 00:00

## 2022-08-31 NOTE — PROCEDURE
[Large Joint Injection] : Large joint injection [Bilateral] : bilaterally of the [Knee] : knee [Pain] : pain [Alcohol] : alcohol [Betadine] : betadine [Ethyl Chloride sprayed topically] : ethyl chloride sprayed topically [Sterile technique used] : sterile technique used [Other: ____] : [unfilled] [#2] : series #2 [] : Patient tolerated procedure well [Patient was advised to rest the joint(s) for ____ days] : patient was advised to rest the joint(s) for [unfilled] days [Risks, benefits, alternatives discussed / Verbal consent obtained] : the risks benefits, and alternatives have been discussed, and verbal consent was obtained [Altered anatomic landmarks d/t erosive arthritis] : altered anatomic landmarks d/t erosive arthritis [All ultrasound images have been permanently captured and stored accordingly in our picture archiving and communication system] : All ultrasound images have been permanently captured and stored accordingly in our picture archiving and communication system [FreeTextEntry3] : Do not submerge underwater for 24 hours

## 2022-08-31 NOTE — IMAGING
[de-identified] : The patient ambulates some mild unsteady gait using a cane\par \par Right knee\par No swelling\par Mild medial facet and joint line tenderness\par Passive range of motion 0° to 120°\par \par Left knee\par No swelling\par Mild medial facet and joint line tenderness\par Passive range of motion 0° to 120°\par \par Both legs\par No swelling\par Calves are soft and nontender

## 2022-08-31 NOTE — DISCUSSION/SUMMARY
[de-identified] : The patient will continue PT and [facility] in [place] with Meche\par Ice p.r.n.\par Tylenol p.r.n.\par Continue vascular followup for PVD with Dr. Jones\par I discussed possible total knee replacements.  He will think about this\par \par Impression:\par Severe osteoarthritis right knee\par Mild to moderate osteoarthritis left knee\par Peripheral vascular disease\par Balance disorder

## 2022-08-31 NOTE — HISTORY OF PRESENT ILLNESS
[Gradual] : gradual [4] : 4 [Dull/Aching] : dull/aching [Intermittent] : intermittent [Leisure] : leisure [Stairs] : stairs [Retired] : Work status: retired [2] : 2 [Other:____] : [unfilled] [de-identified] : The patient feels a little better after the first repeat GenVisc injections in both knees.  He has mild to moderate pain standing and walking [] : Post Surgical Visit: no [de-identified] : 8/24/2022

## 2022-09-07 ENCOUNTER — APPOINTMENT (OUTPATIENT)
Dept: ORTHOPEDIC SURGERY | Facility: CLINIC | Age: 82
End: 2022-09-07

## 2022-09-07 VITALS — WEIGHT: 252 LBS | HEIGHT: 70 IN | BODY MASS INDEX: 36.08 KG/M2

## 2022-09-07 PROCEDURE — 20611 DRAIN/INJ JOINT/BURSA W/US: CPT | Mod: 50

## 2022-09-07 RX ORDER — HYALURONATE SODIUM 10 MG/ML
25 SYRINGE (ML) INTRAARTICULAR
Refills: 0 | Status: COMPLETED | OUTPATIENT
Start: 2022-09-07

## 2022-09-07 RX ADMIN — Medication MG/2.5ML: at 00:00

## 2022-09-07 NOTE — IMAGING
[de-identified] : The patient ambulates some mild unsteady gait using a cane\par \par Right knee\par No swelling\par Mild medial facet and joint line tenderness\par Passive range of motion 0° to 120°\par \par Left knee\par No swelling\par Mild medial facet and joint line tenderness\par Passive range of motion 0° to 120°\par \par Both legs\par No swelling\par Calves are soft and nontender

## 2022-09-07 NOTE — DISCUSSION/SUMMARY
[de-identified] : The patient will continue PT and [facility] in [place] with Meche\par Ice p.r.n.\par Tylenol p.r.n.\par Continue vascular followup for PVD with Dr. Jones\par \par Impression:\par Severe osteoarthritis right knee\par Mild to moderate osteoarthritis left knee\par Peripheral vascular disease\par Balance disorder

## 2022-09-07 NOTE — PROCEDURE
[Large Joint Injection] : Large joint injection [Bilateral] : bilaterally of the [Knee] : knee [Pain] : pain [Alcohol] : alcohol [Betadine] : betadine [Ethyl Chloride sprayed topically] : ethyl chloride sprayed topically [Sterile technique used] : sterile technique used [Other: ____] : [unfilled] [#3] : series #3 [] : Patient tolerated procedure well [Patient was advised to rest the joint(s) for ____ days] : patient was advised to rest the joint(s) for [unfilled] days [Risks, benefits, alternatives discussed / Verbal consent obtained] : the risks benefits, and alternatives have been discussed, and verbal consent was obtained [Altered anatomic landmarks d/t erosive arthritis] : altered anatomic landmarks d/t erosive arthritis [All ultrasound images have been permanently captured and stored accordingly in our picture archiving and communication system] : All ultrasound images have been permanently captured and stored accordingly in our picture archiving and communication system [FreeTextEntry3] : Do not submerge underwater for 24 hours

## 2022-09-14 ENCOUNTER — APPOINTMENT (OUTPATIENT)
Dept: ORTHOPEDIC SURGERY | Facility: CLINIC | Age: 82
End: 2022-09-14

## 2022-09-14 VITALS — HEIGHT: 70 IN | BODY MASS INDEX: 36.08 KG/M2 | WEIGHT: 252 LBS

## 2022-09-14 PROCEDURE — 20611 DRAIN/INJ JOINT/BURSA W/US: CPT | Mod: 50

## 2022-09-14 RX ORDER — HYALURONATE SODIUM 10 MG/ML
25 SYRINGE (ML) INTRAARTICULAR
Refills: 0 | Status: COMPLETED | OUTPATIENT
Start: 2022-09-14

## 2022-09-14 RX ADMIN — Medication MG/2.5ML: at 00:00

## 2022-09-14 NOTE — PROCEDURE
[Large Joint Injection] : Large joint injection [Bilateral] : bilaterally of the [Knee] : knee [Pain] : pain [Alcohol] : alcohol [Betadine] : betadine [Ethyl Chloride sprayed topically] : ethyl chloride sprayed topically [Sterile technique used] : sterile technique used [Other: ____] : [unfilled] [#4] : series #4 [] : Patient tolerated procedure well [Patient was advised to rest the joint(s) for ____ days] : patient was advised to rest the joint(s) for [unfilled] days [Risks, benefits, alternatives discussed / Verbal consent obtained] : the risks benefits, and alternatives have been discussed, and verbal consent was obtained [Altered anatomic landmarks d/t erosive arthritis] : altered anatomic landmarks d/t erosive arthritis [All ultrasound images have been permanently captured and stored accordingly in our picture archiving and communication system] : All ultrasound images have been permanently captured and stored accordingly in our picture archiving and communication system [FreeTextEntry3] : Do not submerge underwater for 24 hours

## 2022-09-14 NOTE — DISCUSSION/SUMMARY
[de-identified] : The patient will continue PT and [facility] in [place] with Meche\par Ice p.r.n.\par Tylenol p.r.n.\par Continue vascular followup for PVD with Dr. Jones\par \par Impression:\par Severe osteoarthritis right knee\par Mild to moderate osteoarthritis left knee\par Peripheral vascular disease\par Balance disorder

## 2022-09-14 NOTE — IMAGING
[de-identified] : The patient ambulates some mild unsteady gait using a cane\par \par Right knee\par No swelling\par Mild medial facet and joint line tenderness\par Passive range of motion 0° to 120°\par \par Left knee\par No swelling\par Mild medial facet and joint line tenderness\par Passive range of motion 0° to 120°\par \par Both legs\par No swelling\par Calves are soft and nontender

## 2022-09-14 NOTE — HISTORY OF PRESENT ILLNESS
[Gradual] : gradual [3] : 3 [Dull/Aching] : dull/aching [Intermittent] : intermittent [Leisure] : leisure [Rest] : rest [Injection therapy] : injection therapy [Stairs] : stairs [Retired] : Work status: retired [4] : 4 [Other:____] : [unfilled] [de-identified] : The patient feels better after the third repeat GenVisc injections in both knees.  He has mild pain standing and walking. [] : Post Surgical Visit: no [de-identified] : 09/07/22

## 2022-09-21 ENCOUNTER — APPOINTMENT (OUTPATIENT)
Dept: ORTHOPEDIC SURGERY | Facility: CLINIC | Age: 82
End: 2022-09-21

## 2022-09-21 VITALS — HEIGHT: 70 IN | BODY MASS INDEX: 36.08 KG/M2 | WEIGHT: 252 LBS

## 2022-09-21 PROCEDURE — 20611 DRAIN/INJ JOINT/BURSA W/US: CPT | Mod: 50

## 2022-09-21 RX ORDER — HYALURONATE SODIUM 10 MG/ML
25 SYRINGE (ML) INTRAARTICULAR
Refills: 0 | Status: COMPLETED | OUTPATIENT
Start: 2022-09-21

## 2022-09-21 RX ADMIN — Medication MG/2.5ML: at 00:00

## 2022-09-21 NOTE — IMAGING
[de-identified] : The patient ambulates some mild unsteady gait using a cane\par \par Right knee\par No swelling\par Mild medial facet and joint line tenderness\par Passive range of motion 0° to 120°\par \par Left knee\par No swelling\par Mild medial facet and joint line tenderness\par Passive range of motion 0° to 120°\par \par Both legs\par No swelling\par Calves are soft and nontender

## 2022-09-21 NOTE — DISCUSSION/SUMMARY
[de-identified] : The patient will continue PT and [facility] in [place] with Meche\par Ice p.r.n.\par Tylenol p.r.n.\par Continue vascular followup for PVD with Dr. Jones\par I discussed possible Zilretta injections and gave him a pamphlet\par \par Impression:\par Severe osteoarthritis right knee\par Mild to moderate osteoarthritis left knee\par Peripheral vascular disease\par Balance disorder

## 2022-09-21 NOTE — PROCEDURE
[Large Joint Injection] : Large joint injection [Bilateral] : bilaterally of the [Knee] : knee [Pain] : pain [Alcohol] : alcohol [Betadine] : betadine [Ethyl Chloride sprayed topically] : ethyl chloride sprayed topically [Sterile technique used] : sterile technique used [Other: ____] : [unfilled] [#5] : series #5 [] : Patient tolerated procedure well [Patient was advised to rest the joint(s) for ____ days] : patient was advised to rest the joint(s) for [unfilled] days [Risks, benefits, alternatives discussed / Verbal consent obtained] : the risks benefits, and alternatives have been discussed, and verbal consent was obtained [Altered anatomic landmarks d/t erosive arthritis] : altered anatomic landmarks d/t erosive arthritis [All ultrasound images have been permanently captured and stored accordingly in our picture archiving and communication system] : All ultrasound images have been permanently captured and stored accordingly in our picture archiving and communication system [FreeTextEntry3] : Do not submerge underwater for 24 hours

## 2022-09-21 NOTE — HISTORY OF PRESENT ILLNESS
[4] : 4 [Dull/Aching] : dull/aching [Intermittent] : intermittent [Household chores] : household chores [Leisure] : leisure [Rest] : rest [Injection therapy] : injection therapy [Walking] : walking [Retired] : Work status: retired [Other:____] : [unfilled] [de-identified] : The patient feels little better after the fourth GenVisc injections in both knees.  He has mild pain standing and walking.  Continued balance problems [] : Post Surgical Visit: no [de-identified] : 9/14/2022

## 2022-10-12 ENCOUNTER — APPOINTMENT (OUTPATIENT)
Dept: CARDIOLOGY | Facility: CLINIC | Age: 82
End: 2022-10-12

## 2022-10-12 ENCOUNTER — NON-APPOINTMENT (OUTPATIENT)
Age: 82
End: 2022-10-12

## 2022-10-12 VITALS
DIASTOLIC BLOOD PRESSURE: 58 MMHG | WEIGHT: 240 LBS | HEIGHT: 70 IN | BODY MASS INDEX: 34.36 KG/M2 | HEART RATE: 67 BPM | SYSTOLIC BLOOD PRESSURE: 121 MMHG | OXYGEN SATURATION: 97 %

## 2022-10-12 PROCEDURE — 93000 ELECTROCARDIOGRAM COMPLETE: CPT

## 2022-10-12 PROCEDURE — 99214 OFFICE O/P EST MOD 30 MIN: CPT

## 2022-10-12 RX ORDER — ASPIRIN ENTERIC COATED TABLETS 81 MG 81 MG/1
81 TABLET, DELAYED RELEASE ORAL
Refills: 0 | Status: DISCONTINUED | COMMUNITY
End: 2022-06-12

## 2022-10-12 NOTE — PHYSICAL EXAM
[General Appearance - Well Developed] : well developed [Normal Appearance] : normal appearance [Well Groomed] : well groomed [General Appearance - Well Nourished] : well nourished [No Deformities] : no deformities [General Appearance - In No Acute Distress] : no acute distress [Normal Conjunctiva] : the conjunctiva exhibited no abnormalities [Normal Oral Mucosa] : normal oral mucosa [Normal Jugular Venous A Waves Present] : normal jugular venous A waves present [Normal Jugular Venous V Waves Present] : normal jugular venous V waves present [No Jugular Venous Wilkes A Waves] : no jugular venous wilkes A waves [Respiration, Rhythm And Depth] : normal respiratory rhythm and effort [Exaggerated Use Of Accessory Muscles For Inspiration] : no accessory muscle use [Auscultation Breath Sounds / Voice Sounds] : lungs were clear to auscultation bilaterally [Bowel Sounds] : normal bowel sounds [Abdomen Soft] : soft [Abdomen Tenderness] : non-tender [Abnormal Walk] : normal gait [Gait - Sufficient For Exercise Testing] : the gait was sufficient for exercise testing [Nail Clubbing] : no clubbing of the fingernails [Cyanosis, Localized] : no localized cyanosis [Skin Color & Pigmentation] : normal skin color and pigmentation [Skin Turgor] : normal skin turgor [] : no rash [Oriented To Time, Place, And Person] : oriented to person, place, and time [Impaired Insight] : insight and judgment were intact [No Anxiety] : not feeling anxious [Normal Rate] : normal [Normal S1] : normal S1 [Normal S2] : normal S2 [No Murmur] : no murmurs heard [2+] : left 2+ [1+] : left 1+ [No Abnormalities] : the abdominal aorta was not enlarged and no bruit was heard [___+] : [unfilled]U+ pretibial pitting edema on the right [S3] : no S3 [S4] : no S4 [Right Carotid Bruit] : no bruit heard over the right carotid [Left Carotid Bruit] : no bruit heard over the left carotid [Right Femoral Bruit] : no bruit heard over the right femoral artery [Left Femoral Bruit] : no bruit heard over the left femoral artery

## 2022-10-12 NOTE — HISTORY OF PRESENT ILLNESS
[FreeTextEntry1] : I saw Eddie Cr in the office today for cardiac follow up.  He was last seen in the office in June, 2022.\par \par He is now 82 years old, with a history of hypertension and hyperlipidemia.  He has a history of smoking in the past.  He has been shown to have disproportionate septal hypertrophy on echocardiography.  He has had syncope in the past, felt to be vagally mediated.  He has had a tendency toward lower extremity edema, right >>left, and he typically wears a compression sock.\par \par In August he phoned the office with weeping edema.  He had forgotten to take his torsemide on vacation.  His edema improved once he resumed it.\par \par He presents to the office today having been feeling well from a cardiovascular perspective.  He reports no chest discomfort or shortness of breath with activity.  He denies orthopnea, PND.  His tendency toward lower extremity edema is unchanged.  He denies palpitations, dizziness and syncope.  He has tried to remain physically active.  He reports that his blood pressure and cholesterol have been well controlled.  \par \par

## 2022-10-12 NOTE — DISCUSSION/SUMMARY
[FreeTextEntry1] : Eddie  is doing well.  His blood pressure is well controlled.  Unfortunately he is still not able to lose weight.  He has no symptoms of concern at this time..\par \par I reviewed his echocardiogram from March 14, 2022.  This revealed an ejection fraction of 55 to 60%.  Aortic valve sclerosis was noted.  There was minimal mitral regurgitation.  Reviewed his carotid ultrasound from March 14, 2022.  This revealed mild bilateral plaque.\par \par I have emphasized the importance of compliance with his diuretic regimen and compression stockings.  Hopefully, he will not have any further major issues associated with his tendency toward lower extremity edema.  He has a follow-up planned with vascular surgery, but apparently no specific recommendations were made the last time.\par \par No additional testing is needed at this time.  I have suggested a follow-up in about 6 months.

## 2022-12-13 ENCOUNTER — APPOINTMENT (OUTPATIENT)
Dept: ORTHOPEDIC SURGERY | Facility: CLINIC | Age: 82
End: 2022-12-13

## 2022-12-13 VITALS — WEIGHT: 240 LBS | BODY MASS INDEX: 34.36 KG/M2 | HEIGHT: 70 IN

## 2022-12-13 PROCEDURE — 99213 OFFICE O/P EST LOW 20 MIN: CPT

## 2022-12-13 NOTE — IMAGING
[de-identified] : The patient ambulates some mild unsteady gait using a cane\par \par Right knee\par No swelling\par Mild medial facet and joint line tenderness\par Passive range of motion 0° to 120°\par Ligaments are stable\par Quad strength 5 minus/5\par \par Left knee\par No swelling\par Mild medial facet and joint line tenderness\par Passive range of motion 0° to 120°\par Ligaments are stable\par Quad strength 5 minus/5\par \par Both legs\par No swelling\par Calves are soft and nontender

## 2022-12-13 NOTE — HISTORY OF PRESENT ILLNESS
[Gradual] : gradual [4] : 4 [Dull/Aching] : dull/aching [Intermittent] : intermittent [Household chores] : household chores [Leisure] : leisure [Rest] : rest [Walking] : walking [Retired] : Work status: retired [de-identified] : The patient feels better after completing GenVisc injections in both knees.  The right knee bothers him more than the left.  He has mild pain standing and walking.  Mild pain with stairs and movie sign.  Going to occupational therapy/physical therapy twice a week.  Doing home exercises [] : Post Surgical Visit: no [de-identified] : 9/21/2022 [de-identified] : Dr. Brooks

## 2022-12-13 NOTE — DISCUSSION/SUMMARY
[de-identified] : The patient will continue PT/OT at [facility] in [place] \par Ice p.r.n.\par Tylenol p.r.n.\par Continue vascular followup for PVD with Dr. Jones\par Again, I discussed Zilretta injections for both knees.  Risks benefits and the alternatives were discussed.  He says he will consider this if his pain worsens\par I discussed possible knee replacements.  If he is interested in this, he can schedule consultation with Dr. Jonathan Cruz\par \par Impression:\par Severe osteoarthritis right knee\par Mild to moderate osteoarthritis left knee\par Peripheral vascular disease\par Balance disorder

## 2023-01-26 NOTE — ASU PATIENT PROFILE, ADULT - HEALTHCARE QUESTIONS, PROFILE
CC: This 52 y.o. male presents for management of diabetes  and chronic conditions pending review including HTN, HLP, fatty liver, hypothyroidism, morbid obesity.    HPI: He was diagnosed with T2DM in March 2018. Has never been hospitalized r/t DM.  Family hx of DM: brother, mother, MGF    hypoglycemia at home-none    Last visit Ozempic increased to 2 mg but the pharmacy never had this dose.    monitoring BG at home:   He lost meter.   Glucoses were in the 100s.    Diet: Eats 2 -3    BF-english muffin  LH-shilpa, pork   DN-lunch  Snacks on cuties, pickles, cheese, nuts. Drinks water, sweet tea. occ coke.  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 Wilks    , retired    Hypothyroidism  Taking 150 mcg daily.  +occ palpitations, hair loss, heat intolerance, occ constipation.   No tremors, diarrhea, changes in hair or nails.    Hypogonadism  Taking Testim 100 mg daily.  Energy and libido are low. Occ morning erections.     Wt Readings from Last 15 Encounters:   01/26/23 (!) 208.8 kg (460 lb 6.9 oz)   11/25/22 (!) 208.2 kg (459 lb)   11/18/22 (!) 208.4 kg (459 lb 7 oz)   11/04/22 (!) 207.7 kg (458 lb)   11/04/22 (!) 207.7 kg (458 lb)   10/25/22 (!) 208 kg (458 lb 8.9 oz)   09/14/22 (!) 202.8 kg (447 lb)   09/06/22 (!) 202.8 kg (447 lb 1.5 oz)   07/29/22 (!) 200.6 kg (442 lb 3.9 oz)   07/13/22 (!) 200 kg (440 lb 14.7 oz)   03/29/22 (!) 201.9 kg (445 lb)   02/19/22 (!) 204.2 kg (450 lb 3.2 oz)   02/18/22 (!) 207.3 kg (457 lb)   01/31/22 (!) 207.6 kg (457 lb 9 oz)   01/19/22 (!) 207.7 kg (457 lb 14.3 oz)      ROS:   Gen: Appetite good, + weight gain (13 lbs since 9/22).  Skin: Skin is intact and heals well, no rashes, no hair changes  Eyes: Denies visual  disturbances  Resp: no SOB or VIZCAINO, no cough  Cardiac: + palpitations h/o MVP, chest pain, trace BLE edema   GI: no nausea or no vomiting, diarrhea, constipation, or abdominal pain.  /GYN: 2-3+ nocturia, burning or pain.   MS/Neuro:  +numbness/ tingling in BLE;  speech clear  Psych: Denies drug/ETOH abuse,+ hx of depression.  Other systems: negative.    PE:  GENERAL: middle aged male, well developed, well nourished.  PSYCH: AAOx3, appropriate mood and affect, pleasant expression, conversant, appears relaxed, well groomed.   CARD: RRR, no gallops or murmurs.  EYES: Conjunctiva, corneas clear  NECK: Supple, trachea midline  NEURO: walks w cane  SKIN: Skin warm and dry no acanthosis nigracans.     Personally reviewed labs below:    Lab Results   Component Value Date    MICALBCREAT 6.5 07/22/2022     Hemoglobin A1C   Date Value Ref Range Status   01/24/2023 6.1 (H) 4.0 - 5.6 % Final     Comment:     ADA Screening Guidelines:  5.7-6.4%  Consistent with prediabetes  >or=6.5%  Consistent with diabetes    High levels of fetal hemoglobin interfere with the HbA1C  assay. Heterozygous hemoglobin variants (HbS, HgC, etc)do  not significantly interfere with this assay.   However, presence of multiple variants may affect accuracy.     07/22/2022 5.6 4.0 - 5.6 % Final     Comment:     ADA Screening Guidelines:  5.7-6.4%  Consistent with prediabetes  >or=6.5%  Consistent with diabetes    High levels of fetal hemoglobin interfere with the HbA1C  assay. Heterozygous hemoglobin variants (HbS, HgC, etc)do  not significantly interfere with this assay.   However, presence of multiple variants may affect accuracy.     01/24/2022 6.0 (H) 4.0 - 5.6 % Final     Comment:     ADA Screening Guidelines:  5.7-6.4%  Consistent with prediabetes  >or=6.5%  Consistent with diabetes    High levels of fetal hemoglobin interfere with the HbA1C  assay. Heterozygous hemoglobin variants (HbS, HgC, etc)do  not significantly interfere with this assay.    However, presence of multiple variants may affect accuracy.          ASSESSMENT and PLAN:    1. T2DM controlled- A1c is below goal. Start Mounjaro 5 mg weekly. Increase the dose to 7.5 mg after one month. Continue Metformin. Stop Ozempic. Continue Metformin. This will help with wt modulation.   Check bg twice daily.    Discussed A1c and BG goals.   - takes ASA, ACEi, statin    2. HTN - controlled, continue meds as previously prescribed and monitor.     3. HLP -stable-conitnue statin LFTs WNL. Notify me for any myalgias    4. Fatty Liver- continue weight loss and cholesterol control    5. Hypothyroid- Continue LT4 150 mcg qday, TFTs are wnl.     6. Super Morbid Obesity-  Body mass index is 62.43 kg/m².   Encourgaed exercise 30 min daily.    7. NAVIN- wears CPAP nightly.    8. Hypogonadism-low-check estrogen if high will prescribe letrozole. Continue testim. May need higher dose due to larger surface area. Body surface area is 3.26 meters squared.   10. Folate deficiency-check folate  11. Vitamin B12 deficiency-check vb12    PSA, ferritin, folate, b12  Follow-up: in 6 months with lab prior                 none

## 2023-04-19 ENCOUNTER — APPOINTMENT (OUTPATIENT)
Dept: CARDIOLOGY | Facility: CLINIC | Age: 83
End: 2023-04-19
Payer: MEDICARE

## 2023-04-19 ENCOUNTER — NON-APPOINTMENT (OUTPATIENT)
Age: 83
End: 2023-04-19

## 2023-04-19 VITALS
BODY MASS INDEX: 34.36 KG/M2 | SYSTOLIC BLOOD PRESSURE: 167 MMHG | HEART RATE: 66 BPM | HEIGHT: 70 IN | WEIGHT: 240 LBS | DIASTOLIC BLOOD PRESSURE: 84 MMHG | OXYGEN SATURATION: 97 %

## 2023-04-19 VITALS — DIASTOLIC BLOOD PRESSURE: 60 MMHG | SYSTOLIC BLOOD PRESSURE: 140 MMHG

## 2023-04-19 PROCEDURE — 93000 ELECTROCARDIOGRAM COMPLETE: CPT

## 2023-04-19 PROCEDURE — 99214 OFFICE O/P EST MOD 30 MIN: CPT

## 2023-04-19 NOTE — PHYSICAL EXAM
[General Appearance - Well Developed] : well developed [Normal Appearance] : normal appearance [Well Groomed] : well groomed [General Appearance - Well Nourished] : well nourished [No Deformities] : no deformities [General Appearance - In No Acute Distress] : no acute distress [Normal Conjunctiva] : the conjunctiva exhibited no abnormalities [Normal Oral Mucosa] : normal oral mucosa [Normal Jugular Venous A Waves Present] : normal jugular venous A waves present [Normal Jugular Venous V Waves Present] : normal jugular venous V waves present [No Jugular Venous Wilkes A Waves] : no jugular venous wilkes A waves [Respiration, Rhythm And Depth] : normal respiratory rhythm and effort [Exaggerated Use Of Accessory Muscles For Inspiration] : no accessory muscle use [Auscultation Breath Sounds / Voice Sounds] : lungs were clear to auscultation bilaterally [Bowel Sounds] : normal bowel sounds [Abdomen Tenderness] : non-tender [Abdomen Soft] : soft [Abnormal Walk] : normal gait [Gait - Sufficient For Exercise Testing] : the gait was sufficient for exercise testing [Nail Clubbing] : no clubbing of the fingernails [Skin Color & Pigmentation] : normal skin color and pigmentation [Cyanosis, Localized] : no localized cyanosis [Skin Turgor] : normal skin turgor [] : no rash [Impaired Insight] : insight and judgment were intact [Oriented To Time, Place, And Person] : oriented to person, place, and time [No Anxiety] : not feeling anxious [Normal Rate] : normal [Normal S1] : normal S1 [Normal S2] : normal S2 [No Murmur] : no murmurs heard [2+] : left 2+ [1+] : right 1+ [No Abnormalities] : the abdominal aorta was not enlarged and no bruit was heard [___+] : [unfilled]U+ pretibial pitting edema on the right [S3] : no S3 [S4] : no S4 [Right Carotid Bruit] : no bruit heard over the right carotid [Left Carotid Bruit] : no bruit heard over the left carotid [Right Femoral Bruit] : no bruit heard over the right femoral artery [Left Femoral Bruit] : no bruit heard over the left femoral artery

## 2023-04-19 NOTE — HISTORY OF PRESENT ILLNESS
[FreeTextEntry1] : I saw Eddie Cr in the office today for cardiac follow up.  He was last seen in the office in October, 2022.\par \par He is now 83 years old, with a history of hypertension and hyperlipidemia.  He has a history of smoking in the past.  He has been shown to have disproportionate septal hypertrophy on echocardiography.  He has had syncope in the past, felt to be vagally mediated.  He has had a tendency toward lower extremity edema, right >>left, and he typically wears a compression sock.\par \par In August he phoned the office with weeping edema.  He had forgotten to take his torsemide on vacation.  His edema improved once he resumed it.\par \par He presents to the office today having been feeling well from a cardiovascular perspective.  He reports no chest discomfort or shortness of breath with activity.  He has started walking more, though he walks with difficulty with a cane, and finds that he needs to slow down a bit at times, and then continues on his way.  He thinks this might be related to his weight or his orthopedic issues. He denies orthopnea, PND.  His tendency toward lower extremity edema is unchanged.  He denies palpitations, dizziness and syncope.  He does not check his blood pressure at home.

## 2023-04-19 NOTE — DISCUSSION/SUMMARY
[FreeTextEntry1] : Eddie  is doing well.  His blood pressure is initially somewhat elevated, but by the conclusion of the examination his systolic becomes improved, and his diastolic falls to 60.  I will not make a change at this time.  Unfortunately he is still not able to lose weight.  He has no symptoms of concern at this time.\par \par I reviewed his echocardiogram from March 14, 2022.  This revealed an ejection fraction of 55 to 60%.  Aortic valve sclerosis was noted.  There was minimal mitral regurgitation.  I reviewed his carotid ultrasound from March 14, 2022.  This revealed mild bilateral plaque.\par \par I have emphasized the importance of compliance with his diuretic regimen and compression stockings.  Hopefully, he will not have any further major issues associated with his tendency toward lower extremity edema.  \par \par He will have blood work done this morning, but no additional testing is needed at this time.  I have suggested a follow-up in about 6 months.

## 2023-04-20 LAB
ALBUMIN SERPL ELPH-MCNC: 4.9 G/DL
ALP BLD-CCNC: 103 U/L
ALT SERPL-CCNC: 22 U/L
ANION GAP SERPL CALC-SCNC: 13 MMOL/L
AST SERPL-CCNC: 29 U/L
BASOPHILS # BLD AUTO: 0.07 K/UL
BASOPHILS NFR BLD AUTO: 1.1 %
BILIRUB SERPL-MCNC: 0.4 MG/DL
BUN SERPL-MCNC: 27 MG/DL
CALCIUM SERPL-MCNC: 10.2 MG/DL
CHLORIDE SERPL-SCNC: 103 MMOL/L
CHOLEST SERPL-MCNC: 165 MG/DL
CO2 SERPL-SCNC: 26 MMOL/L
CREAT SERPL-MCNC: 1.41 MG/DL
EGFR: 50 ML/MIN/1.73M2
EOSINOPHIL # BLD AUTO: 0.27 K/UL
EOSINOPHIL NFR BLD AUTO: 4.1 %
ESTIMATED AVERAGE GLUCOSE: 117 MG/DL
GLUCOSE SERPL-MCNC: 105 MG/DL
HBA1C MFR BLD HPLC: 5.7 %
HCT VFR BLD CALC: 41.9 %
HDLC SERPL-MCNC: 38 MG/DL
HGB BLD-MCNC: 13.4 G/DL
IMM GRANULOCYTES NFR BLD AUTO: 0.3 %
LDLC SERPL CALC-MCNC: 102 MG/DL
LYMPHOCYTES # BLD AUTO: 1.29 K/UL
LYMPHOCYTES NFR BLD AUTO: 19.7 %
MAN DIFF?: NORMAL
MCHC RBC-ENTMCNC: 31.2 PG
MCHC RBC-ENTMCNC: 32 GM/DL
MCV RBC AUTO: 97.4 FL
MONOCYTES # BLD AUTO: 0.73 K/UL
MONOCYTES NFR BLD AUTO: 11.1 %
NEUTROPHILS # BLD AUTO: 4.18 K/UL
NEUTROPHILS NFR BLD AUTO: 63.7 %
NONHDLC SERPL-MCNC: 126 MG/DL
PLATELET # BLD AUTO: 208 K/UL
POTASSIUM SERPL-SCNC: 5.2 MMOL/L
PROT SERPL-MCNC: 6.8 G/DL
RBC # BLD: 4.3 M/UL
RBC # FLD: 14.2 %
SODIUM SERPL-SCNC: 143 MMOL/L
TRIGL SERPL-MCNC: 120 MG/DL
TSH SERPL-ACNC: 2.96 UIU/ML
WBC # FLD AUTO: 6.56 K/UL

## 2023-04-25 ENCOUNTER — RX RENEWAL (OUTPATIENT)
Age: 83
End: 2023-04-25

## 2023-04-25 RX ORDER — TORSEMIDE 20 MG/1
20 TABLET ORAL
Qty: 180 | Refills: 3 | Status: ACTIVE | COMMUNITY
Start: 2023-04-25

## 2023-05-16 ENCOUNTER — APPOINTMENT (OUTPATIENT)
Dept: ORTHOPEDIC SURGERY | Facility: CLINIC | Age: 83
End: 2023-05-16
Payer: MEDICARE

## 2023-05-16 VITALS — WEIGHT: 240 LBS | BODY MASS INDEX: 34.36 KG/M2 | HEIGHT: 70 IN

## 2023-05-16 PROCEDURE — 73562 X-RAY EXAM OF KNEE 3: CPT | Mod: 50

## 2023-05-16 PROCEDURE — 99213 OFFICE O/P EST LOW 20 MIN: CPT | Mod: 25

## 2023-05-16 PROCEDURE — 20611 DRAIN/INJ JOINT/BURSA W/US: CPT | Mod: 50

## 2023-05-16 RX ORDER — HYALURONATE SODIUM 10 MG/ML
25 SYRINGE (ML) INTRAARTICULAR
Refills: 0 | Status: COMPLETED | OUTPATIENT
Start: 2023-05-16

## 2023-05-16 RX ADMIN — Medication MG/2.5ML: at 00:00

## 2023-05-16 NOTE — IMAGING
[Bilateral] : knee bilaterally [de-identified] : The patient ambulates some mild unsteady gait using a cane\par \par Right knee\par No swelling\par Mild medial facet and joint line tenderness\par Passive range of motion 0° to 120°\par Ligaments are stable\par Quad strength 5 minus/5\par \par Left knee\par No swelling\par Mild medial facet and joint line tenderness\par Passive range of motion 0° to 120°\par Ligaments are stable\par Quad strength 5 minus/5\par \par Both legs\par No swelling\par Calves are soft and nontender [FreeTextEntry9] : Reviewed and interpreted.  Right knee AP standing, lateral, sunrise views-severe degenerative changes with narrowing of the medial compartment on AP standing view, spurring patellofemoral joint\par \par Reviewed and interpreted.  Left knee AP standing, lateral, sunrise views-mild to moderate tricompartmental degenerative changes

## 2023-05-16 NOTE — DISCUSSION/SUMMARY
[de-identified] : The patient will continue PT/OT at [facility] in [place] \par Ice p.r.n.\par Tylenol p.r.n.\par Continue vascular followup for PVD with Dr. Jones\par He would like to repeat GenVisc injections in both knees.  Risk benefits and the alternatives were discussed\par \par Impression:\par Severe osteoarthritis right knee\par Mild to moderate osteoarthritis left knee\par Peripheral vascular disease\par Balance disorder

## 2023-05-16 NOTE — PROCEDURE
Patient Education       Well Child Exam 15 Months   About this topic   Your child's 15-month well child exam is a visit with the doctor to check your child's health. The doctor measures your child's weight, height, and head size. The doctor plots these numbers on a growth curve. The growth curve gives a picture of your child's growth at each visit. The doctor may listen to your child's heart, lungs, and belly. Your doctor will do a full exam of your child from the head to the toes.  Your child may also need shots or blood tests during this visit.  General   Growth and Development   Your doctor will ask you how your child is developing. The doctor will focus on the skills that most children your child's age are expected to do. During this time of your child's life, here are some things you can expect.  · Movement ? Your child may:  ? Walk well without help  ? Use a crayon to scribble or make marks  ? Able to stack three blocks  ? Explore places and things  ? Imitate your actions  · Hearing, seeing, and talking ? Your child will likely:  ? Have 3 or 5 other words  ? Be able to follow simple directions and point to a body part when asked  ? Begin to have a preference for certain activities, and strong dislikes for others  ? Want your love and praise. Hug your child and say I love you often. Say thank you when your child does something nice.  ? Begin to understand no. Try to distract or redirect to correct your child.  ? Begin to have temper tantrums. Ignore them if possible.  · Feeding ? Your child:  ? Should drink whole milk until 2 years old  ? Is ready to give up the bottle and drink from a cup or sippy cup  ? Will be eating 3 meals and 2 to 3 snacks a day. However, your child may eat less than before and this is normal.  ? Should be given a variety of healthy foods with different textures. Let your child decide how much to eat.  ? Should be able to eat without help. May be able to use a spoon or fork but  probably prefers finger foods.  ? Should avoid foods that might cause choking like grapes, popcorn, hot dogs, or hard candy.  ? Should have no fruit juice most days and no more than 4 ounces (120 mL) of fruit juice a day  ? Will need you to clean the teeth after a feeding with a wet washcloth or a wet child's toothbrush. You may use a smear of toothpaste with fluoride in it 2 times each day.  · Sleep ? Your child:  ? Should still sleep in a safe crib. Your child may be ready to sleep in a toddler bed if climbing out of the crib after naps or in the morning.  ? Is likely sleeping about 10 to 15 hours in a row at night  ? Needs 1 to 2 naps each day  ? Sleeps about a total of 14 hours each day  ? Should be able to fall asleep without help. If your child wakes up at night, check on your child. Do not pick your child up, offer a bottle, or play with your child. Doing these things will not help your child fall asleep without help.  ? Should not have a bottle in bed. This can cause tooth decay or ear infections.  · Vaccines ? It is important for your child to get shots on time. This protects from very serious illnesses like lung infections, meningitis, or infections that harm the nervous system. Your baby may also need a flu shot. Check with your doctor to make sure your baby's shots are up to date. Your child may need:  ? DTaP or diphtheria, tetanus, and pertussis vaccine  ? Hib or  Haemophilus influenzae type b vaccine  ? PCV or pneumococcal conjugate vaccine  ? MMR or measles, mumps, and rubella vaccine  ? Varicella or chickenpox vaccine  ? Hep A or hepatitis A vaccine  ? Flu or influenza vaccine  ? Your child may get some of these combined into one shot. This lowers the number of shots your child may get and yet keeps them protected.  Help for Parents   · Play with your child.  ? Go outside as often as you can.  ? Give your child soft balls, blocks, and containers to play with. Toys that can be stacked or nest inside  of one another are also good.  ? Cars, trains, and toys to push, pull, or walk behind are fun. So are puzzles and animal or people figures.  ? Help your child pretend. Use an empty cup to take a drink. Push a block and make sounds like it is a car or a boat.  ? Read to your child. Name the things in the pictures in the book. Talk and sing to your child. This helps your child learn language skills.  · Here are some things you can do to help keep your child safe and healthy.  ? Do not allow anyone to smoke in your home or around your child.  ? Have the right size car seat for your child and use it every time your child is in the car. Your child should be rear facing until 2 years of age.  ? Be sure furniture, shelves, and televisions are secure and cannot tip over onto your child.  ? Take extra care around water. Close bathroom doors. Never leave your child in the tub alone.  ? Never leave your child alone. Do not leave your child in the car, in the bath, or at home alone, even for a few minutes.  ? Avoid long exposure to direct sunlight by keeping your child in the shade. Use sunscreen if shade is not possible.  ? Protect your child from gun injuries. If you have a gun, use a trigger lock. Keep the gun locked up and the bullets kept in a separate place.  ? Avoid screen time for children under 2 years old. This means no TV, computers, or video games. They can cause problems with brain development.  · Parents need to think about:  ? Having emergency numbers, including poison control, in your phone or posted near the phone  ? How to distract your child when doing something you dont want your child to do  ? Using positive words to tell your child what you want, rather than saying no or what not to do  · Your next well child visit will most likely be when your child is 18 months old. At this visit your doctor may:  ? Do a full check up on your child  ? Talk about making sure your home is safe for your child, how well  your child is eating, and how to correct your child  ? Give your child the next set of shots  When do I need to call the doctor?   · Fever of 100.4°F (38°C) or higher  · Sleeps all the time or has trouble sleeping  · Won't stop crying  · You are worried about your child's development  Last Reviewed Date   2021-09-20  Consumer Information Use and Disclaimer   This information is not specific medical advice and does not replace information you receive from your health care provider. This is only a brief summary of general information. It does NOT include all information about conditions, illnesses, injuries, tests, procedures, treatments, therapies, discharge instructions or life-style choices that may apply to you. You must talk with your health care provider for complete information about your health and treatment options. This information should not be used to decide whether or not to accept your health care providers advice, instructions or recommendations. Only your health care provider has the knowledge and training to provide advice that is right for you.  Copyright   Copyright © 2021 [employer] and its affiliates and/or licensors. All rights reserved.    Children under the age of 2 years will be restrained in a rear facing child safety seat.   If you have an active [email or web address] account, please look for your well child questionnaire to come to your [facility] account before your next well child visit.   [Large Joint Injection] : Large joint injection [Bilateral] : bilaterally of the [Knee] : knee [Pain] : pain [Alcohol] : alcohol [Betadine] : betadine [Ethyl Chloride sprayed topically] : ethyl chloride sprayed topically [Sterile technique used] : sterile technique used [#1] : series #1 [] : Patient tolerated procedure well [Patient was advised to rest the joint(s) for ____ days] : patient was advised to rest the joint(s) for [unfilled] days [Risks, benefits, alternatives discussed / Verbal consent obtained] : the risks benefits, and alternatives have been discussed, and verbal consent was obtained [Altered anatomic landmarks d/t erosive arthritis] : altered anatomic landmarks d/t erosive arthritis [All ultrasound images have been permanently captured and stored accordingly in our picture archiving and communication system] : All ultrasound images have been permanently captured and stored accordingly in our picture archiving and communication system [GenVisc 850 (25mg)] : 25mg of GenVisc 850 [FreeTextEntry3] : Do not submerge underwater for 24 hours

## 2023-05-16 NOTE — HISTORY OF PRESENT ILLNESS
[Gradual] : gradual [4] : 4 [Dull/Aching] : dull/aching [Leisure] : leisure [Rest] : rest [Stairs] : stairs [Retired] : Work status: retired [1] : 1 [Other:____] : [unfilled] [de-identified] : The patient has had some increased pain in both knees over the past few weeks.  Mild pain standing and walking.  Mild to moderate pain after sitting and getting up.  He did have good improvement after previous GenVisc injections in both knees [] : Post Surgical Visit: no [de-identified] : 12/13/2022

## 2023-05-23 ENCOUNTER — APPOINTMENT (OUTPATIENT)
Dept: ORTHOPEDIC SURGERY | Facility: CLINIC | Age: 83
End: 2023-05-23
Payer: MEDICARE

## 2023-05-23 VITALS — WEIGHT: 240 LBS | HEIGHT: 70 IN | BODY MASS INDEX: 34.36 KG/M2

## 2023-05-23 PROCEDURE — 20611 DRAIN/INJ JOINT/BURSA W/US: CPT | Mod: 50

## 2023-05-23 RX ORDER — HYALURONATE SODIUM 10 MG/ML
25 SYRINGE (ML) INTRAARTICULAR
Refills: 0 | Status: COMPLETED | OUTPATIENT
Start: 2023-05-23

## 2023-05-23 RX ADMIN — Medication MG/2.5ML: at 00:00

## 2023-05-23 NOTE — PROCEDURE
[Large Joint Injection] : Large joint injection [Bilateral] : bilaterally of the [Knee] : knee [Pain] : pain [Alcohol] : alcohol [Betadine] : betadine [Ethyl Chloride sprayed topically] : ethyl chloride sprayed topically [Sterile technique used] : sterile technique used [GenVisc 850 (25mg)] : 25mg of GenVisc 850 [#2] : series #2 [] : Patient tolerated procedure well [Patient was advised to rest the joint(s) for ____ days] : patient was advised to rest the joint(s) for [unfilled] days [Risks, benefits, alternatives discussed / Verbal consent obtained] : the risks benefits, and alternatives have been discussed, and verbal consent was obtained [Altered anatomic landmarks d/t erosive arthritis] : altered anatomic landmarks d/t erosive arthritis [All ultrasound images have been permanently captured and stored accordingly in our picture archiving and communication system] : All ultrasound images have been permanently captured and stored accordingly in our picture archiving and communication system [FreeTextEntry3] : Do not submerge underwater for 24 hours

## 2023-05-23 NOTE — DISCUSSION/SUMMARY
[de-identified] : The patient will continue PT/OT at [facility] in [place] \par Ice p.r.n.\par Tylenol p.r.n.\par Continue vascular followup for PVD with Dr. Jones\par \par Impression:\par Severe osteoarthritis right knee\par Mild to moderate osteoarthritis left knee\par Peripheral vascular disease\par Balance disorder

## 2023-05-23 NOTE — HISTORY OF PRESENT ILLNESS
[Gradual] : gradual [3] : 3 [Dull/Aching] : dull/aching [Intermittent] : intermittent [Rest] : rest [Retired] : Work status: retired [2] : 2 [Other:____] : [unfilled] [de-identified] : The patient feels little improvement after the first GenVisc injections in both knees.  He has mild pain standing and walking.  Mild to moderate pain after sitting and getting up [] : Post Surgical Visit: no [de-identified] : 5/16/23

## 2023-05-23 NOTE — IMAGING
[de-identified] : The patient ambulates some mild unsteady gait using a cane\par \par Right knee\par No swelling\par Mild medial facet and joint line tenderness\par Passive range of motion 0° to 120°\par \par Left knee\par No swelling\par Mild medial facet and joint line tenderness\par Passive range of motion 0° to 120°\par \par \par Both legs\par No swelling\par Calves are soft and nontender

## 2023-05-30 ENCOUNTER — APPOINTMENT (OUTPATIENT)
Dept: ORTHOPEDIC SURGERY | Facility: CLINIC | Age: 83
End: 2023-05-30
Payer: MEDICARE

## 2023-05-30 VITALS — HEIGHT: 70 IN | BODY MASS INDEX: 34.36 KG/M2 | WEIGHT: 240 LBS

## 2023-05-30 PROCEDURE — 20611 DRAIN/INJ JOINT/BURSA W/US: CPT | Mod: 50

## 2023-05-30 RX ORDER — HYALURONATE SODIUM 10 MG/ML
25 SYRINGE (ML) INTRAARTICULAR
Refills: 0 | Status: COMPLETED | OUTPATIENT
Start: 2023-05-30

## 2023-05-30 RX ADMIN — Medication MG/2.5ML: at 00:00

## 2023-05-30 NOTE — PHYSICAL EXAM
No [Normal Mood and Affect] : normal mood and affect [Able to Communicate] : able to communicate [Well Developed] : well developed [Well Nourished] : well nourished

## 2023-06-07 LAB
ALBUMIN SERPL ELPH-MCNC: 4.4 G/DL
ALP BLD-CCNC: 91 U/L
ALT SERPL-CCNC: 20 U/L
AST SERPL-CCNC: 23 U/L
BILIRUB DIRECT SERPL-MCNC: 0.1 MG/DL
BILIRUB INDIRECT SERPL-MCNC: 0.3 MG/DL
BILIRUB SERPL-MCNC: 0.4 MG/DL
CHOLEST SERPL-MCNC: 136 MG/DL
HDLC SERPL-MCNC: 45 MG/DL
LDLC SERPL CALC-MCNC: 79 MG/DL
NONHDLC SERPL-MCNC: 91 MG/DL
PROT SERPL-MCNC: 6.7 G/DL
TRIGL SERPL-MCNC: 60 MG/DL

## 2023-06-13 ENCOUNTER — APPOINTMENT (OUTPATIENT)
Dept: ORTHOPEDIC SURGERY | Facility: CLINIC | Age: 83
End: 2023-06-13
Payer: MEDICARE

## 2023-06-13 VITALS — WEIGHT: 240 LBS | HEIGHT: 70 IN | BODY MASS INDEX: 34.36 KG/M2

## 2023-06-13 PROCEDURE — 20611 DRAIN/INJ JOINT/BURSA W/US: CPT | Mod: 50

## 2023-06-13 RX ORDER — HYALURONATE SODIUM 10 MG/ML
25 SYRINGE (ML) INTRAARTICULAR
Refills: 0 | Status: COMPLETED | OUTPATIENT
Start: 2023-06-13

## 2023-06-13 RX ADMIN — Medication MG/2.5ML: at 00:00

## 2023-06-13 NOTE — HISTORY OF PRESENT ILLNESS
[Gradual] : gradual [4] : 4 [Dull/Aching] : dull/aching [Intermittent] : intermittent [Leisure] : leisure [Rest] : rest [Stairs] : stairs [3] : 3 [Other:____] : [unfilled] [de-identified] : The patient continues to improve after the second GenVisc injections in both knees.  He has mild pain standing and walking [] : Post Surgical Visit: no [de-identified] : 5/23/2023

## 2023-06-13 NOTE — PROCEDURE
[Large Joint Injection] : Large joint injection [Bilateral] : bilaterally of the [Knee] : knee [Pain] : pain [Alcohol] : alcohol [Betadine] : betadine [Ethyl Chloride sprayed topically] : ethyl chloride sprayed topically [Sterile technique used] : sterile technique used [GenVisc 850 (25mg)] : 25mg of GenVisc 850 [#4] : series #4 [] : Patient tolerated procedure well [Patient was advised to rest the joint(s) for ____ days] : patient was advised to rest the joint(s) for [unfilled] days [Risks, benefits, alternatives discussed / Verbal consent obtained] : the risks benefits, and alternatives have been discussed, and verbal consent was obtained [Altered anatomic landmarks d/t erosive arthritis] : altered anatomic landmarks d/t erosive arthritis [All ultrasound images have been permanently captured and stored accordingly in our picture archiving and communication system] : All ultrasound images have been permanently captured and stored accordingly in our picture archiving and communication system [FreeTextEntry3] : Do not submerge underwater for 24 hours\par

## 2023-06-13 NOTE — DISCUSSION/SUMMARY
[de-identified] : The patient will continue PT/OT at [facility] in [place] \par Ice p.r.n.\par Tylenol p.r.n.\par Continue vascular followup for PVD with Dr. Jones\par \par Impression:\par Severe osteoarthritis right knee\par Mild to moderate osteoarthritis left knee\par Peripheral vascular disease\par Balance disorder

## 2023-06-13 NOTE — IMAGING
[de-identified] : The patient ambulates some mild unsteady gait using a cane\par \par Right knee\par No swelling. \par Mild medial facet and joint line tenderness\par Passive range of motion 0° to 120°\par \par Left knee\par No swelling\par Mild medial facet and joint line tenderness\par Passive range of motion 0° to 120°\par \par \par Both legs\par No swelling\par Calves are soft and nontender

## 2023-06-13 NOTE — HISTORY OF PRESENT ILLNESS
[Gradual] : gradual [Dull/Aching] : dull/aching [Household chores] : household chores [Leisure] : leisure [Rest] : rest [Walking] : walking [Retired] : Work status: retired [4] : 4 [Other:____] : [unfilled] [de-identified] : The patient feels better after the third GenVisc injection in both knees.  He has mild pain standing and walking [] : Post Surgical Visit: no [de-identified] : 5/30/2023 [de-identified] : Dr. Brooks  [de-identified] : 5/30/2023

## 2023-06-13 NOTE — PROCEDURE
[Large Joint Injection] : Large joint injection [Bilateral] : bilaterally of the [Knee] : knee [Pain] : pain [Alcohol] : alcohol [Betadine] : betadine [Ethyl Chloride sprayed topically] : ethyl chloride sprayed topically [Sterile technique used] : sterile technique used [GenVisc 850 (25mg)] : 25mg of GenVisc 850 [] : Patient tolerated procedure well [Patient was advised to rest the joint(s) for ____ days] : patient was advised to rest the joint(s) for [unfilled] days [Risks, benefits, alternatives discussed / Verbal consent obtained] : the risks benefits, and alternatives have been discussed, and verbal consent was obtained [Altered anatomic landmarks d/t erosive arthritis] : altered anatomic landmarks d/t erosive arthritis [All ultrasound images have been permanently captured and stored accordingly in our picture archiving and communication system] : All ultrasound images have been permanently captured and stored accordingly in our picture archiving and communication system [#3] : series #3 [FreeTextEntry3] : Do not submerge underwater for 24 hours\par Right knee injection was given medially

## 2023-06-13 NOTE — DISCUSSION/SUMMARY
[de-identified] : The patient will continue PT/OT at [facility] in [place] \par Ice p.r.n.\par Tylenol p.r.n.\par Continue vascular followup for PVD with Dr. Jones\par \par Impression:\par Severe osteoarthritis right knee\par Mild to moderate osteoarthritis left knee\par Peripheral vascular disease\par Balance disorder

## 2023-06-13 NOTE — IMAGING
[de-identified] : The patient ambulates some mild unsteady gait using a cane\par \par Right knee\par No swelling.  5 mm area of purplish discoloration lateral knee in region of prior injection\par Mild medial facet and joint line tenderness\par Passive range of motion 0° to 120°\par \par Left knee\par No swelling\par Mild medial facet and joint line tenderness\par Passive range of motion 0° to 120°\par \par \par Both legs\par No swelling\par Calves are soft and nontender

## 2023-06-20 ENCOUNTER — APPOINTMENT (OUTPATIENT)
Dept: ORTHOPEDIC SURGERY | Facility: CLINIC | Age: 83
End: 2023-06-20
Payer: MEDICARE

## 2023-06-20 VITALS — WEIGHT: 240 LBS | HEIGHT: 70 IN | BODY MASS INDEX: 34.36 KG/M2

## 2023-06-20 PROCEDURE — 99213 OFFICE O/P EST LOW 20 MIN: CPT | Mod: 25

## 2023-06-20 PROCEDURE — 20611 DRAIN/INJ JOINT/BURSA W/US: CPT | Mod: 50

## 2023-06-20 RX ORDER — HYALURONATE SODIUM 10 MG/ML
25 SYRINGE (ML) INTRAARTICULAR
Refills: 0 | Status: COMPLETED | OUTPATIENT
Start: 2023-06-20

## 2023-06-20 RX ADMIN — Medication MG/2.5ML: at 00:00

## 2023-06-20 NOTE — DISCUSSION/SUMMARY
[de-identified] : Follow-up plan was outlined and reviewed with the patient\par Continue home exercises\par I discussed possible cortisone injection if flareup of symptoms\par Continue work-up for TIA\par The patient will continue PT/OT at [facility] in [place] \par Ice p.r.n.\par Tylenol p.r.n.\par I discussed repeating GenVisc injections in the future\par Continue vascular followup for PVD with Dr. Jones\par \par Impression:\par Severe osteoarthritis right knee\par Mild to moderate osteoarthritis left knee\par Peripheral vascular disease\par Balance disorder

## 2023-06-20 NOTE — IMAGING
[de-identified] : The patient ambulates some mild unsteady gait using a cane\par \par Right knee\par No swelling. \par Mild medial facet and joint line tenderness\par Passive range of motion 0° to 120°\par \par Left knee\par No swelling.  5 mm area of purplish discoloration lateral knee in region of prior injection\par Mild medial facet and joint line tenderness\par Passive range of motion 0° to 120°\par \par \par Both legs\par No swelling\par Calves are soft and nontender

## 2023-06-20 NOTE — HISTORY OF PRESENT ILLNESS
[Gradual] : gradual [3] : 3 [Dull/Aching] : dull/aching [Intermittent] : intermittent [Rest] : rest [Stairs] : stairs [5] : 5 [Other:____] : [unfilled] [de-identified] : The patient continues to improve after the fourth GenVisc injection in both knees.  He has mild pain standing and walking.  Continued issues with balance.  He is undergoing work-up for TIA and memory issues [] : Post Surgical Visit: no [de-identified] : 06/13/23

## 2023-06-20 NOTE — PROCEDURE
[Large Joint Injection] : Large joint injection [Bilateral] : bilaterally of the [Knee] : knee [Pain] : pain [Alcohol] : alcohol [Betadine] : betadine [Ethyl Chloride sprayed topically] : ethyl chloride sprayed topically [Sterile technique used] : sterile technique used [GenVisc 850 (25mg)] : 25mg of GenVisc 850 [#5] : series #5 [] : Patient tolerated procedure well [Patient was advised to rest the joint(s) for ____ days] : patient was advised to rest the joint(s) for [unfilled] days [Risks, benefits, alternatives discussed / Verbal consent obtained] : the risks benefits, and alternatives have been discussed, and verbal consent was obtained [Altered anatomic landmarks d/t erosive arthritis] : altered anatomic landmarks d/t erosive arthritis [All ultrasound images have been permanently captured and stored accordingly in our picture archiving and communication system] : All ultrasound images have been permanently captured and stored accordingly in our picture archiving and communication system [FreeTextEntry3] : Do not submerge underwater for 24 hours\par Left knee injection was given medially

## 2023-08-07 NOTE — HISTORY OF PRESENT ILLNESS
LM for patient to call office back to schedule 1yr f/u, schedule next available.  Appointment reminder mailed [Gradual] : gradual [Dull/Aching] : dull/aching [Intermittent] : intermittent [Leisure] : leisure [Rest] : rest [Injection therapy] : injection therapy [Stairs] : stairs [Retired] : Work status: retired [3] : 3 [Other:____] : [unfilled] [de-identified] : The patient continues to improve after the second repeat GenVisc injections in both knees.  He has mild to moderate pain standing and walking [] : Post Surgical Visit: no [de-identified] : 08/31/22

## 2023-09-07 NOTE — PROGRESS NOTE ADULT - PROBLEM SELECTOR PLAN 6
I contacted pt and rescheduled her to 11/6 at 8:30 a.m.; she said shed take the appt but is worried the site has grown and it bleeds. She wanted to know if she can be seen sooner than November. Please advise. Holding BP meds in the setting o orthostatic hypotension.  -continue to monitor. If BP elevated persistently elevated will resume home BP meds BP meds held in the setting of orthostatic hypotension. BP within acceptable range while off antihypertensive meds.  -continue to monitor. If BP elevated persistently elevated will resume home BP meds

## 2023-10-18 ENCOUNTER — NON-APPOINTMENT (OUTPATIENT)
Age: 83
End: 2023-10-18

## 2023-10-18 ENCOUNTER — APPOINTMENT (OUTPATIENT)
Dept: CARDIOLOGY | Facility: CLINIC | Age: 83
End: 2023-10-18
Payer: MEDICARE

## 2023-10-18 VITALS
OXYGEN SATURATION: 96 % | SYSTOLIC BLOOD PRESSURE: 173 MMHG | HEIGHT: 70 IN | HEART RATE: 62 BPM | BODY MASS INDEX: 34.36 KG/M2 | WEIGHT: 240 LBS | DIASTOLIC BLOOD PRESSURE: 79 MMHG

## 2023-10-18 DIAGNOSIS — R94.31 ABNORMAL ELECTROCARDIOGRAM [ECG] [EKG]: ICD-10-CM

## 2023-10-18 DIAGNOSIS — I65.29 OCCLUSION AND STENOSIS OF UNSPECIFIED CAROTID ARTERY: ICD-10-CM

## 2023-10-18 PROCEDURE — 93000 ELECTROCARDIOGRAM COMPLETE: CPT

## 2023-10-18 PROCEDURE — 99214 OFFICE O/P EST MOD 30 MIN: CPT

## 2023-10-18 RX ORDER — MEMANTINE HYDROCHLORIDE 5 MG/1
5 TABLET, FILM COATED ORAL
Refills: 0 | Status: ACTIVE | COMMUNITY

## 2023-10-18 RX ORDER — DONEPEZIL HYDROCHLORIDE 10 MG/1
10 TABLET ORAL
Qty: 90 | Refills: 3 | Status: ACTIVE | COMMUNITY

## 2023-10-23 NOTE — PHYSICAL THERAPY INITIAL EVALUATION ADULT - MD/RN NOTIFIED
"Mone Hinton.    I am proud of you for getting your breast cancer screening done!    The radiologist interpreted your test result findings as INCOMPLETE. This category is also called "BI-RADS 0." This means the radiologist may have seen a possible abnormality, but it was not clear.    This result category may mean that you need more tests, such as another mammogram with the use of spot compression (applying compression to a smaller area when doing the mammogram), magnified views, special mammogram views, or ultrasound. This category may also suggest that the radiologist wants to compare your new mammogram with older ones to see if there have been changes in the area over time.    Someone from [facility] breast imaging center should be contacting you with details. Let me know if you haven't heard from them within the next two weeks.    I'll share your final (complete) breast imaging test results with you once I have received them.    Thanks for letting me care for you, and thanks for trusting [facility] with your healthcare needs.    Sincerely,    AFRICA Plummer MD " yes

## 2023-12-18 ENCOUNTER — APPOINTMENT (OUTPATIENT)
Dept: ORTHOPEDIC SURGERY | Facility: CLINIC | Age: 83
End: 2023-12-18
Payer: MEDICARE

## 2023-12-18 VITALS — WEIGHT: 240 LBS | HEIGHT: 70 IN | BODY MASS INDEX: 34.36 KG/M2

## 2023-12-18 PROCEDURE — 99214 OFFICE O/P EST MOD 30 MIN: CPT

## 2023-12-18 NOTE — DISCUSSION/SUMMARY
[de-identified] : Continue home exercises The patient will continue OT at [facility] in [place]  Ice p.r.n. Tylenol p.r.n. The patient would like to repeat GenVisc injections in both knees.  Risk benefits and the alternatives were discussed Continue vascular followup for PVD with Dr. Jones  Impression: Severe osteoarthritis right knee Mild to moderate osteoarthritis left knee Peripheral vascular disease Balance disorder

## 2023-12-18 NOTE — HISTORY OF PRESENT ILLNESS
[Gradual] : gradual [4] : 4 [Dull/Aching] : dull/aching [Intermittent] : intermittent [Household chores] : household chores [Leisure] : leisure [Rest] : rest [Injection therapy] : injection therapy [Walking] : walking [Stairs] : stairs [Retired] : Work status: retired [de-identified] : The patient has had increased pain in both knees over the past few weeks.  The right knee bothers her more than the left.  He has mild to moderate pain standing and walking.  Pain with stairs and movie sign.  He did have good improvement after previous GenVisc injections.  He has been going to OT once a week working on balance and his knees [] : Post Surgical Visit: no [FreeTextEntry1] : B Knees  [de-identified] : 6/20/2023 [de-identified] : Dr. Brooks

## 2023-12-18 NOTE — IMAGING
[de-identified] : The patient ambulates some mild unsteady gait using a cane  Right knee No swelling.  Mild medial facet and joint line tenderness Passive range of motion 0 to 120 degrees Ligaments are stable Quad strength 5 -/5  Left knee No swelling.   Mild medial facet and joint line tenderness Passive range of motion 0 to 120 degrees Ligaments are stable Quad strength 5-/5   Both legs No swelling Calves are soft and nontender

## 2024-01-08 ENCOUNTER — APPOINTMENT (OUTPATIENT)
Dept: ORTHOPEDIC SURGERY | Facility: CLINIC | Age: 84
End: 2024-01-08
Payer: MEDICARE

## 2024-01-08 VITALS — WEIGHT: 240 LBS | BODY MASS INDEX: 34.36 KG/M2 | HEIGHT: 70 IN

## 2024-01-08 PROCEDURE — 20611 DRAIN/INJ JOINT/BURSA W/US: CPT | Mod: 50

## 2024-01-08 RX ORDER — HYALURONATE SODIUM 10 MG/ML
25 SYRINGE (ML) INTRAARTICULAR
Refills: 0 | Status: COMPLETED | OUTPATIENT
Start: 2024-01-08

## 2024-01-08 RX ADMIN — Medication MG/2.5ML: at 00:00

## 2024-01-08 NOTE — IMAGING
[de-identified] : The patient ambulates some mild unsteady gait using a cane  Right knee No swelling.  Mild medial facet and joint line tenderness Passive range of motion 0 to 120 degrees  Left knee No swelling.   Mild medial facet and joint line tenderness Passive range of motion 0 to 120 degrees   Both legs No swelling Calves are soft and nontender

## 2024-01-08 NOTE — PROCEDURE
[Large Joint Injection] : Large joint injection [Bilateral] : bilaterally of the [Knee] : knee [Pain] : pain [Alcohol] : alcohol [Betadine] : betadine [Ethyl Chloride sprayed topically] : ethyl chloride sprayed topically [Sterile technique used] : sterile technique used [GenVisc 850 (25mg)] : 25mg of GenVisc 850 [#1] : series #1 [] : Patient tolerated procedure well [Patient was advised to rest the joint(s) for ____ days] : patient was advised to rest the joint(s) for [unfilled] days [Risks, benefits, alternatives discussed / Verbal consent obtained] : the risks benefits, and alternatives have been discussed, and verbal consent was obtained [Altered anatomic landmarks d/t erosive arthritis] : altered anatomic landmarks d/t erosive arthritis [All ultrasound images have been permanently captured and stored accordingly in our picture archiving and communication system] : All ultrasound images have been permanently captured and stored accordingly in our picture archiving and communication system [FreeTextEntry3] : Do not submerge underwater for 24 hours\par

## 2024-01-08 NOTE — DISCUSSION/SUMMARY
[de-identified] : Continue home exercises The patient will continue OT at [facility] in [place]  Ice p.r.n. Tylenol p.r.n. The patient would like to repeat GenVisc injections in both knees.  Risk benefits and the alternatives were discussed Continue vascular followup for PVD with Dr. Jones  Impression: Severe osteoarthritis right knee Mild to moderate osteoarthritis left knee Peripheral vascular disease Balance disorder

## 2024-01-08 NOTE — HISTORY OF PRESENT ILLNESS
[Gradual] : gradual [3] : 3 [Dull/Aching] : dull/aching [Intermittent] : intermittent [Rest] : rest [1] : 1 [Other:____] : [unfilled] [de-identified] : The patient has continued pain in both knees.  The right knee bothers him more than the left.  He has mild to moderate pain standing and walking.  Pain with stairs and movie sign.   [] : Post Surgical Visit: no [de-identified] : 12/18/23 [de-identified] : Dr. Brooks

## 2024-01-15 ENCOUNTER — APPOINTMENT (OUTPATIENT)
Dept: ORTHOPEDIC SURGERY | Facility: CLINIC | Age: 84
End: 2024-01-15
Payer: MEDICARE

## 2024-01-15 ENCOUNTER — APPOINTMENT (OUTPATIENT)
Dept: MRI IMAGING | Facility: CLINIC | Age: 84
End: 2024-01-15
Payer: MEDICARE

## 2024-01-15 VITALS — BODY MASS INDEX: 34.36 KG/M2 | WEIGHT: 240 LBS | HEIGHT: 70 IN

## 2024-01-15 PROCEDURE — 73502 X-RAY EXAM HIP UNI 2-3 VIEWS: CPT

## 2024-01-15 PROCEDURE — 72148 MRI LUMBAR SPINE W/O DYE: CPT | Mod: MH

## 2024-01-15 PROCEDURE — 99214 OFFICE O/P EST MOD 30 MIN: CPT

## 2024-01-15 PROCEDURE — 72100 X-RAY EXAM L-S SPINE 2/3 VWS: CPT

## 2024-01-15 RX ORDER — HYALURONATE SODIUM 10 MG/ML
25 SYRINGE (ML) INTRAARTICULAR
Refills: 0 | Status: COMPLETED | OUTPATIENT
Start: 2024-01-15

## 2024-01-15 RX ADMIN — Medication MG/2.5ML: at 00:00

## 2024-01-15 NOTE — HISTORY OF PRESENT ILLNESS
[Gradual] : gradual [4] : 4 [Dull/Aching] : dull/aching [Intermittent] : intermittent [Leisure] : leisure [Rest] : rest [Stairs] : stairs [Retired] : Work status: retired [1] : 1 [Other:____] : [unfilled] [de-identified] : The patient has continued pain in both knees.  The right knee bothers him more than the left.  He has mild to moderate pain standing and walking.  Pain with stairs and movie sign.  Slight improvement after the first GenVisc injections The patient has had chronic lower back pain.  This has increased over the past 2 weeks.  No injury.  He has mild to moderate pain standing and walking.  Occasional pain radiating to his right leg with occasional buckling.  He has history of lumbar laminectomy L2-5 by Dr. Jared Villalba on August 16, 2018 [] : Post Surgical Visit: no [FreeTextEntry1] : B Knees  [de-identified] : 6/20/2023

## 2024-01-15 NOTE — IMAGING
[de-identified] : The patient ambulates some mild unsteady gait using a cane  Lumbosacral spine Healed midline incision Mild tenderness right lumbosacral region Straight leg raising is positive for lower back pain at 60 degrees on the right Motor lower extremities-quad strength 5 -/5 bilaterally, remainder normal  Right hip No pain with passive motion.  No tenderness  Left hip No pain with passive motion.  No tenderness   Right knee No swelling.  Mild medial facet and joint line tenderness Passive range of motion 0 to 120 degrees  Left knee No swelling.   Mild medial facet and joint line tenderness Passive range of motion 0 to 120 degrees   Both legs No swelling Calves are soft and nontender [FreeTextEntry1] : Reviewed and interpreted.  Lumbosacral spine AP and lateral views-severe multilevel disc space narrowing.  Aortic calcification [de-identified] : Reviewed and interpreted.  Pelvis AP with lateral right hip-mild degenerative changes of the hips bilaterally

## 2024-01-15 NOTE — DISCUSSION/SUMMARY
[de-identified] : The patient will have MRI lumbosacral spine Continue home exercises The patient will continue OT at [facility] in [facility] p.r.n. Tylenol p.r.n. Continue vascular followup for PVD with Dr. Jones I discussed possible neurology follow-up with Dr. Christian Hale   Impression: Chronic lumbosacral strain/spondylosis.  Status post laminectomy and decompression L2-L5 by Dr. Jared Villalba on August 16, 2018 Severe osteoarthritis right knee Mild to moderate osteoarthritis left knee Peripheral vascular disease Balance disorder

## 2024-01-15 NOTE — DATA REVIEWED
[MRI] : MRI [Lumbar Spine] : lumbar spine [I independently reviewed and interpreted images and report] : I independently reviewed and interpreted images and report [FreeTextEntry1] : MRI lumbosacral spine done at [facility] September 20, 2019 reported is status post interval moderate bilateral laminectomies L2-3 through L5-S1 with good posterior decompression.  L1-2 bulging with mild stenosis right foraminal disc herniation L2-3

## 2024-01-15 NOTE — PROCEDURE
[Large Joint Injection] : Large joint injection [Bilateral] : bilaterally of the [Knee] : knee [Pain] : pain [Alcohol] : alcohol [Betadine] : betadine [Ethyl Chloride sprayed topically] : ethyl chloride sprayed topically [Sterile technique used] : sterile technique used [GenVisc 850 (25mg)] : 25mg of GenVisc 850 [#2] : series #2 [] : Patient tolerated procedure well [Patient was advised to rest the joint(s) for ____ days] : patient was advised to rest the joint(s) for [unfilled] days [Risks, benefits, alternatives discussed / Verbal consent obtained] : the risks benefits, and alternatives have been discussed, and verbal consent was obtained [Altered anatomic landmarks d/t erosive arthritis] : altered anatomic landmarks d/t erosive arthritis [All ultrasound images have been permanently captured and stored accordingly in our picture archiving and communication system] : All ultrasound images have been permanently captured and stored accordingly in our picture archiving and communication system [FreeTextEntry3] : Do not submerge underwater for 24 hours\par

## 2024-01-16 ENCOUNTER — NON-APPOINTMENT (OUTPATIENT)
Age: 84
End: 2024-01-16

## 2024-01-22 ENCOUNTER — APPOINTMENT (OUTPATIENT)
Dept: ORTHOPEDIC SURGERY | Facility: CLINIC | Age: 84
End: 2024-01-22
Payer: MEDICARE

## 2024-01-22 VITALS — WEIGHT: 240 LBS | BODY MASS INDEX: 34.36 KG/M2 | HEIGHT: 70 IN

## 2024-01-22 DIAGNOSIS — R26.89 OTHER ABNORMALITIES OF GAIT AND MOBILITY: ICD-10-CM

## 2024-01-22 DIAGNOSIS — R93.7 ABNORMAL FINDINGS ON DIAGNOSTIC IMAGING OF OTHER PARTS OF MUSCULOSKELETAL SYSTEM: ICD-10-CM

## 2024-01-22 PROCEDURE — 20611 DRAIN/INJ JOINT/BURSA W/US: CPT | Mod: 50

## 2024-01-22 PROCEDURE — 99214 OFFICE O/P EST MOD 30 MIN: CPT | Mod: 25

## 2024-01-22 RX ORDER — HYALURONATE SODIUM 10 MG/ML
25 SYRINGE (ML) INTRAARTICULAR
Refills: 0 | Status: COMPLETED | OUTPATIENT
Start: 2024-01-22

## 2024-01-22 RX ADMIN — Medication MG/2.5ML: at 00:00

## 2024-01-22 NOTE — IMAGING
[de-identified] : The patient ambulates some mild unsteady gait using a cane  Right knee No swelling.  Mild medial facet and joint line tenderness Passive range of motion 0 to 120 degrees  Left knee No swelling.   Mild medial facet and joint line tenderness Passive range of motion 0 to 120 degrees   Both legs No swelling Calves are soft and nontender

## 2024-01-22 NOTE — DISCUSSION/SUMMARY
[de-identified] : MRI lumbosacral spine was discussed with the patient If for any reason he develops any significant neurologic changes, weakness lower extremities, loss of bowel bladder control, advised to go to emergency room immediately Recommend he have pain management consult with Dr. Carolynn Mays Continue home exercises The patient will continue OT at [facility] in [facility] p.r.n. Tylenol p.r.n. Continue vascular followup for PVD with Dr. Jones I discussed possible neurology follow-up with Dr. Christian Hale   Impression: Chronic lumbosacral strain/spondylosis.  Status post laminectomy and decompression L2-L5 by Dr. Jared Villalba on August 16, 2018 Severe osteoarthritis right knee Mild to moderate osteoarthritis left knee Peripheral vascular disease Balance disorder

## 2024-01-22 NOTE — DATA REVIEWED
[MRI] : MRI [Lumbar Spine] : lumbar spine [I independently reviewed and interpreted images and report] : I independently reviewed and interpreted images and report [FreeTextEntry1] : MRI lumbosacral spine January 15, 2024 was reviewed. There is multilevel degenerative disc disease. Postsurgical changes L2-L5. Posterior subcutaneous edema. Moderate stenosis L1-2 with posterior lipomatosis. Mild stenosis L2-3. Mild stenosis L3-4. Mild stenosis L4-5. Moderate stenosis at L5-S1 with grade 1 spondylolisthesis and epidural lipomatosis

## 2024-01-22 NOTE — PROCEDURE
[Large Joint Injection] : Large joint injection [Bilateral] : bilaterally of the [Knee] : knee [Pain] : pain [Alcohol] : alcohol [Betadine] : betadine [Ethyl Chloride sprayed topically] : ethyl chloride sprayed topically [Sterile technique used] : sterile technique used [GenVisc 850 (25mg)] : 25mg of GenVisc 850 [#3] : series #3 [] : Patient tolerated procedure well [Patient was advised to rest the joint(s) for ____ days] : patient was advised to rest the joint(s) for [unfilled] days [Risks, benefits, alternatives discussed / Verbal consent obtained] : the risks benefits, and alternatives have been discussed, and verbal consent was obtained [Altered anatomic landmarks d/t erosive arthritis] : altered anatomic landmarks d/t erosive arthritis [All ultrasound images have been permanently captured and stored accordingly in our picture archiving and communication system] : All ultrasound images have been permanently captured and stored accordingly in our picture archiving and communication system [FreeTextEntry3] : Do not submerge underwater for 24 hours\par

## 2024-01-22 NOTE — HISTORY OF PRESENT ILLNESS
[Gradual] : gradual [4] : 4 [Dull/Aching] : dull/aching [Intermittent] : intermittent [Rest] : rest [Injection therapy] : injection therapy [Walking] : walking [Stairs] : stairs [Retired] : Work status: retired [3] : 3 [Other:____] : [unfilled] [de-identified] : The patient has continued pain in both knees.  The right knee bothers him more than the left.  He has mild to moderate pain standing and walking.  Pain with stairs and movie sign.  Slight improvement after the second GenVisc injections The patient has continued mild to moderate pain right lumbosacral region.  Pain with change of position.  Pain with standing and walking.  No radicular complaints at the present time.  He had MRI lumbosacral spine [] : Post Surgical Visit: no [FreeTextEntry1] : B Knees  [de-identified] : 1/15/2024

## 2024-01-24 ENCOUNTER — APPOINTMENT (OUTPATIENT)
Dept: CARDIOLOGY | Facility: CLINIC | Age: 84
End: 2024-01-24
Payer: MEDICARE

## 2024-01-24 ENCOUNTER — NON-APPOINTMENT (OUTPATIENT)
Age: 84
End: 2024-01-24

## 2024-01-24 VITALS
BODY MASS INDEX: 34.79 KG/M2 | SYSTOLIC BLOOD PRESSURE: 150 MMHG | WEIGHT: 243 LBS | DIASTOLIC BLOOD PRESSURE: 75 MMHG | HEART RATE: 52 BPM | OXYGEN SATURATION: 98 % | HEIGHT: 70 IN

## 2024-01-24 VITALS — DIASTOLIC BLOOD PRESSURE: 60 MMHG | SYSTOLIC BLOOD PRESSURE: 130 MMHG

## 2024-01-24 PROCEDURE — G2211 COMPLEX E/M VISIT ADD ON: CPT

## 2024-01-24 PROCEDURE — 99214 OFFICE O/P EST MOD 30 MIN: CPT

## 2024-01-24 PROCEDURE — 93000 ELECTROCARDIOGRAM COMPLETE: CPT

## 2024-01-24 NOTE — HISTORY OF PRESENT ILLNESS
[FreeTextEntry1] : I saw Eddie Cr in the office today for cardiac follow up.  He was last seen in the office 3 months ago.  He is now 83 years old, with a history of hypertension and hyperlipidemia.  He has a history of smoking in the past.  He has been shown to have disproportionate septal hypertrophy on echocardiography.  He has had syncope in the past, felt to be vagally mediated.  He has had a tendency toward lower extremity edema, right >>left, and he typically wears a compression sock.  He has frequent PVCs.  At the time of his visit in October 2023, he was feeling well.  He had been alerted to the possibility of bradycardia by his watch.  I recommended an extended Holter.  This revealed frequent PVCs, but no meaningful bradycardia.    He presents to the office today having been feeling well from a cardiovascular perspective.  He reports no chest discomfort or shortness of breath with activity.  His activities remain quite limited overall.  He denies orthopnea, PND.  His tendency toward lower extremity edema is unchanged.  He denies palpitations, and syncope.  He checks his blood pressure rarely, and it has been somewhat labile.  At times his blood pressure in the doctor's office has been 180, but at home, it has been much better.  He has been describing some issues with his eyes, where they feel a little bit itchy, and he has been noticing that things a little bit darker, and then a bit of a bit lighter, without clear explanation. His Apple Watch has been alerting him that he is intermittently bradycardic.  This has no symptoms associated with it, and only happens rarely.  This is essentially unchanged compared with what we discussed at his last visit.  An EKG performed at his primary care physician's office revealed ventricular bigeminy, and he was told to follow-up here more urgently.

## 2024-01-24 NOTE — REVIEW OF SYSTEMS
Destruction After The Procedure: No Dressing: bandage Additional Anesthesia Volume In Cc (Will Not Render If 0): 0 Electrodesiccation Text: The wound bed was treated with electrodesiccation after the biopsy was performed. Was A Bandage Applied: Yes Cryotherapy Text: The wound bed was treated with cryotherapy after the biopsy was performed. Depth Of Biopsy: dermis Size Of Lesion In Cm: 0.2 Biopsy Method: Dermablade Billing Type: Third-Party Bill Anesthesia Volume In Cc: 0.5 Hemostasis: Drysol Consent: Written consent was obtained and risks were reviewed including but not limited to scarring, infection, bleeding, scabbing, incomplete removal, nerve damage and allergy to anesthesia. Electrodesiccation And Curettage Text: The wound bed was treated with electrodesiccation and curettage after the biopsy was performed. Type Of Destruction Used: Curettage Silver Nitrate Text: The wound bed was treated with silver nitrate after the biopsy was performed. Post-Care Instructions: I reviewed with the patient in detail post-care instructions: If your area was covered with a Band-Aid, remove with your next bath and gently clean daily with soap and water. Do not use hydrogen peroxide. Apply a thin layer of Aquaphor/Vaseline and cover with a Band-Aid. The goal is to keep the wound moist to prevent a scab. It is normal to have a little redness 1/8 inch around the area; however, if increased redness, pain, swelling or discolored drainage occurs, call the office. If bleeding occurs, hold steady pressure for 10-15 minutes to stop it. Our office normally receives your results in 7-10 business days and will call or email you when we receive them. If you do not hear from us in two weeks, please call us. Wound Care: Polysporin ointment Detail Level: Detailed Biopsy Type: H and E Curettage Text: The wound bed was treated with curettage after the biopsy was performed. Notification Instructions: Patient will be notified of biopsy results. However, patient instructed to call the office if not contacted within 2 weeks. Anesthesia Type: 1% lidocaine with epinephrine and a 1:10 solution of 8.4% sodium bicarbonate [Weight Gain (___ Lbs)] : [unfilled] ~Ulb weight gain [Joint Pain] : joint pain [Lower Back Pain] : lower back pain [Knee Pain] : knee pain [Memory Lapses Or Loss] : memory lapses or loss [Negative] : Genitourinary [Rash] : no rash [Depression] : no depression [Anxiety] : no anxiety [Easy Bleeding] : no tendency for easy bleeding [Easy Bruising] : no tendency for easy bruising

## 2024-01-24 NOTE — DISCUSSION/SUMMARY
[FreeTextEntry1] : Eddie  is doing well.  His blood pressure is initially somewhat elevated, but by the conclusion of the examination his blood pressure normalizes.  It sounds like some of his blood pressure numbers will be artifactually abnormal related to the frequency of his premature beats.  I have explained this in detail.  I reviewed his echocardiogram from March 14, 2022.  This revealed an ejection fraction of 55 to 60%.  Aortic valve sclerosis was noted.  There was minimal mitral regurgitation.  I reviewed his carotid ultrasound from March 14, 2022.  This revealed mild bilateral plaque.  I reviewed his extended Holter from October 2023.  This revealed an underlying sinus rhythm with no meaningful bradycardia.  Average heart rate was in the range of 69 bpm.  There were frequent PVCs, accounting for 22.4% of all heartbeats.  It is not clear to me whether some of his symptoms of his vision changes might reflect symptoms from PVCs as his blood pressure and heart rate go up and down.  I do not think I would give him anything for the PVCs at this time, but we should at least consider the possibility that he is symptomatic from them.  He will try to write down what his heart rate and blood pressure are doing when he feels somewhat more unwell.  We will see if it makes sense for us to consider the use of an antiarrhythmic drug to suppress PVCs.  At present, I think the risk would exceed the benefit.    [EKG obtained to assist in diagnosis and management of assessed problem(s)] : EKG obtained to assist in diagnosis and management of assessed problem(s)

## 2024-01-24 NOTE — PHYSICAL EXAM
[General Appearance - Well Developed] : well developed [Normal Appearance] : normal appearance [Well Groomed] : well groomed [General Appearance - Well Nourished] : well nourished [No Deformities] : no deformities [General Appearance - In No Acute Distress] : no acute distress [Normal Conjunctiva] : the conjunctiva exhibited no abnormalities [Normal Oral Mucosa] : normal oral mucosa [Normal Jugular Venous V Waves Present] : normal jugular venous V waves present [Normal Jugular Venous A Waves Present] : normal jugular venous A waves present [No Jugular Venous Wilkes A Waves] : no jugular venous wilkes A waves [Respiration, Rhythm And Depth] : normal respiratory rhythm and effort [Exaggerated Use Of Accessory Muscles For Inspiration] : no accessory muscle use [Auscultation Breath Sounds / Voice Sounds] : lungs were clear to auscultation bilaterally [Bowel Sounds] : normal bowel sounds [Abdomen Soft] : soft [Abdomen Tenderness] : non-tender [Abnormal Walk] : normal gait [Gait - Sufficient For Exercise Testing] : the gait was sufficient for exercise testing [Nail Clubbing] : no clubbing of the fingernails [Cyanosis, Localized] : no localized cyanosis [Skin Color & Pigmentation] : normal skin color and pigmentation [Skin Turgor] : normal skin turgor [] : no rash [Oriented To Time, Place, And Person] : oriented to person, place, and time [Impaired Insight] : insight and judgment were intact [No Anxiety] : not feeling anxious [Normal Rate] : normal [Normal S1] : normal S1 [Normal S2] : normal S2 [No Murmur] : no murmurs heard [2+] : left 2+ [1+] : right 1+ [No Abnormalities] : the abdominal aorta was not enlarged and no bruit was heard [___+] : [unfilled]U+ pretibial pitting edema on the right [S3] : no S3 [S4] : no S4 [Right Carotid Bruit] : no bruit heard over the right carotid [Left Carotid Bruit] : no bruit heard over the left carotid [Right Femoral Bruit] : no bruit heard over the right femoral artery [Left Femoral Bruit] : no bruit heard over the left femoral artery

## 2024-02-05 ENCOUNTER — APPOINTMENT (OUTPATIENT)
Dept: ORTHOPEDIC SURGERY | Facility: CLINIC | Age: 84
End: 2024-02-05
Payer: MEDICARE

## 2024-02-05 VITALS — WEIGHT: 243 LBS | BODY MASS INDEX: 34.79 KG/M2 | HEIGHT: 70 IN

## 2024-02-05 DIAGNOSIS — M48.07 SPINAL STENOSIS, LUMBOSACRAL REGION: ICD-10-CM

## 2024-02-05 DIAGNOSIS — S39.012D STRAIN OF MUSCLE, FASCIA AND TENDON OF LOWER BACK, SUBSEQUENT ENCOUNTER: ICD-10-CM

## 2024-02-05 DIAGNOSIS — M47.817 SPONDYLOSIS W/OUT MYELOPATHY OR RADICULOPATHY, LUMBOSACRAL REGION: ICD-10-CM

## 2024-02-05 PROCEDURE — 20611 DRAIN/INJ JOINT/BURSA W/US: CPT | Mod: 50

## 2024-02-05 PROCEDURE — 99213 OFFICE O/P EST LOW 20 MIN: CPT | Mod: 25

## 2024-02-05 RX ORDER — HYALURONATE SODIUM 10 MG/ML
25 SYRINGE (ML) INTRAARTICULAR
Refills: 0 | Status: COMPLETED | OUTPATIENT
Start: 2024-02-05

## 2024-02-05 RX ADMIN — Medication MG/2.5ML: at 00:00

## 2024-02-05 NOTE — DISCUSSION/SUMMARY
[de-identified] : If for any reason he develops any significant neurologic changes, weakness lower extremities, loss of bowel bladder control, advised to go to emergency room immediately Recommend he have pain management consult with Dr. Carolynn Mays.  He is urged to schedule an appointment Continue home exercises The patient will continue OT at [facility] in [facility] p.r.n. Tylenol p.r.n. Continue vascular followup for PVD with Dr. Jones Recommend he have follow-up neurology consult with Dr. Christian Hale   Impression: Chronic lumbosacral strain/spondylosis.  Status post laminectomy and decompression L2-L5 by Dr. Jared Villalba on August 16, 2018 Severe osteoarthritis right knee Mild to moderate osteoarthritis left knee Peripheral vascular disease Balance disorder

## 2024-02-05 NOTE — IMAGING
[de-identified] : The patient ambulates some mild unsteady gait using a cane  Right knee No swelling.  Mild medial facet and joint line tenderness Passive range of motion 0 to 120 degrees  Left knee No swelling.   Mild medial facet and joint line tenderness Passive range of motion 0 to 120 degrees   Both legs No swelling Calves are soft and nontender

## 2024-02-05 NOTE — HISTORY OF PRESENT ILLNESS
[Gradual] : gradual [Dull/Aching] : dull/aching [Intermittent] : intermittent [Leisure] : leisure [Rest] : rest [Stairs] : stairs [Retired] : Work status: retired [4] : 4 [Other:____] : [unfilled] [de-identified] : The patient has continued pain in both knees.  The right knee bothers him more than the left.  He has mild to moderate pain standing and walking.  Pain with stairs and movie sign.  Slight improvement after the third GenVisc injections The patient has continued mild to moderate pain right lumbosacral region.  Pain with change of position.  Pain with standing and walking.   [] : Post Surgical Visit: no [FreeTextEntry1] : B Knees  [de-identified] : 1/22/2024

## 2024-02-12 ENCOUNTER — APPOINTMENT (OUTPATIENT)
Dept: ORTHOPEDIC SURGERY | Facility: CLINIC | Age: 84
End: 2024-02-12
Payer: MEDICARE

## 2024-02-12 VITALS — HEIGHT: 70 IN | BODY MASS INDEX: 34.79 KG/M2 | WEIGHT: 243 LBS

## 2024-02-12 DIAGNOSIS — M17.0 BILATERAL PRIMARY OSTEOARTHRITIS OF KNEE: ICD-10-CM

## 2024-02-12 PROCEDURE — 20611 DRAIN/INJ JOINT/BURSA W/US: CPT | Mod: 50

## 2024-02-12 RX ORDER — HYALURONATE SODIUM 10 MG/ML
25 SYRINGE (ML) INTRAARTICULAR
Refills: 0 | Status: COMPLETED | OUTPATIENT
Start: 2024-02-12

## 2024-02-12 RX ADMIN — Medication MG/2.5ML: at 00:00

## 2024-02-12 NOTE — IMAGING
[de-identified] : The patient ambulates some mild unsteady gait using a cane  Right knee No swelling.  Mild medial facet and joint line tenderness Passive range of motion 0 to 120 degrees  Left knee No swelling.   Mild medial facet and joint line tenderness Passive range of motion 0 to 120 degrees   Both legs No swelling Calves are soft and nontender

## 2024-02-12 NOTE — PROCEDURE
[Large Joint Injection] : Large joint injection [Bilateral] : bilaterally of the [Knee] : knee [Pain] : pain [Alcohol] : alcohol [Betadine] : betadine [Ethyl Chloride sprayed topically] : ethyl chloride sprayed topically [Sterile technique used] : sterile technique used [GenVisc 850 (25mg)] : 25mg of GenVisc 850 [] : Patient tolerated procedure well [Patient was advised to rest the joint(s) for ____ days] : patient was advised to rest the joint(s) for [unfilled] days [Risks, benefits, alternatives discussed / Verbal consent obtained] : the risks benefits, and alternatives have been discussed, and verbal consent was obtained [Altered anatomic landmarks d/t erosive arthritis] : altered anatomic landmarks d/t erosive arthritis [All ultrasound images have been permanently captured and stored accordingly in our picture archiving and communication system] : All ultrasound images have been permanently captured and stored accordingly in our picture archiving and communication system [#5] : series #5 [FreeTextEntry3] : Do not submerge underwater for 24 hours\par

## 2024-02-12 NOTE — DISCUSSION/SUMMARY
[de-identified] : If for any reason he develops any significant neurologic changes, weakness lower extremities, loss of bowel bladder control, advised to go to emergency room immediately Recommend he have pain management consult with Dr. Carolynn Mays.  He is urged to schedule an appointment Continue home exercises The patient will continue OT at [facility] in [facility] p.r.n. Tylenol p.r.n. Continue vascular followup for PVD with Dr. Jones Recommend he have follow-up neurology consult with Dr. Christian Hale   Impression: Chronic lumbosacral strain/spondylosis.  Status post laminectomy and decompression L2-L5 by Dr. Jared Villalba on August 16, 2018 Severe osteoarthritis right knee Mild to moderate osteoarthritis left knee Peripheral vascular disease Balance disorder

## 2024-02-12 NOTE — HISTORY OF PRESENT ILLNESS
[Gradual] : gradual [4] : 4 [Dull/Aching] : dull/aching [Intermittent] : intermittent [Leisure] : leisure [Rest] : rest [Stairs] : stairs [Retired] : Work status: retired [Other:____] : [unfilled] [de-identified] : The patient has continued pain in both knees.  The right knee bothers him more than the left.  He has mild to moderate pain standing and walking.  Pain with stairs and movie sign.  Slight improvement after the fourth GenVisc injections The patient has continued mild to moderate pain right lumbosacral region.  Pain with change of position.  Pain with standing and walking.  Continued problems with balance [] : Post Surgical Visit: no [FreeTextEntry1] : B Knees  [de-identified] : 2/5/2024

## 2024-02-29 RX ORDER — ROSUVASTATIN CALCIUM 10 MG/1
10 TABLET, FILM COATED ORAL
Qty: 90 | Refills: 2 | Status: ACTIVE | COMMUNITY

## 2024-04-16 ENCOUNTER — APPOINTMENT (OUTPATIENT)
Dept: CARDIOLOGY | Facility: CLINIC | Age: 84
End: 2024-04-16
Payer: MEDICARE

## 2024-04-16 VITALS — SYSTOLIC BLOOD PRESSURE: 120 MMHG | DIASTOLIC BLOOD PRESSURE: 65 MMHG

## 2024-04-16 VITALS
BODY MASS INDEX: 34.5 KG/M2 | OXYGEN SATURATION: 97 % | DIASTOLIC BLOOD PRESSURE: 67 MMHG | HEART RATE: 61 BPM | SYSTOLIC BLOOD PRESSURE: 180 MMHG | HEIGHT: 70 IN | WEIGHT: 241 LBS

## 2024-04-16 DIAGNOSIS — I49.3 VENTRICULAR PREMATURE DEPOLARIZATION: ICD-10-CM

## 2024-04-16 PROCEDURE — 99214 OFFICE O/P EST MOD 30 MIN: CPT

## 2024-04-16 PROCEDURE — G2211 COMPLEX E/M VISIT ADD ON: CPT

## 2024-04-16 PROCEDURE — 93000 ELECTROCARDIOGRAM COMPLETE: CPT

## 2024-04-16 NOTE — HISTORY OF PRESENT ILLNESS
[FreeTextEntry1] : I saw Eddie Cr in the office today for cardiac follow up.  He was last seen in the office 3 months ago.  He is now 84 years old, with a history of hypertension and hyperlipidemia.  He has a history of smoking in the past.  He has been shown to have disproportionate septal hypertrophy on echocardiography.  He has had syncope in the past, felt to be vagally mediated.  He has had a tendency toward lower extremity edema, right >>left, and he typically wears a compression sock.  He has frequent PVCs.  At the time of his visit in October 2023, he was feeling well.  He had been alerted to the possibility of bradycardia by his watch.  I recommended an extended Holter.  This revealed frequent PVCs, but no meaningful bradycardia.  When I saw him in January, 2024, an EKG had been performed at his primary care physician's office, revealing ventricular bigeminy.  We discussed his PVCs, and although they were quite frequent, did not seem as if they were symptomatic, and so we decided to remain conservative.  He presents to the office today having been feeling well from a cardiovascular perspective.  He reports no chest discomfort or shortness of breath with activity.  His activities remain quite limited overall.  He denies orthopnea, PND.  His tendency toward lower extremity edema is unchanged.  He denies palpitations, and syncope.  He checks his blood pressure rarely, and it has been somewhat labile.  At times his blood pressure in the doctor's office has been 180, but at home, it has been much better.  His Apple Watch has not been alerting him that he is bradycardic.  This is improved compared with what we discussed at his last visit.

## 2024-04-16 NOTE — DISCUSSION/SUMMARY
[FreeTextEntry1] : Eddie  is doing well.  His blood pressure is initially somewhat elevated, but by the conclusion of the examination his blood pressure normalizes.   I reviewed his echocardiogram from March 14, 2022.  This revealed an ejection fraction of 55 to 60%.  Aortic valve sclerosis was noted.  There was minimal mitral regurgitation.  I reviewed his carotid ultrasound from March 14, 2022.  This revealed mild bilateral plaque.  I reviewed his extended Holter from October 2023.  This revealed an underlying sinus rhythm with no meaningful bradycardia.  Average heart rate was in the range of 69 bpm.  There were frequent PVCs, accounting for 22.4% of all heartbeats.  His PVCs are much less frequent now based on his own measurements.  I do not think I would do anything for them at this point.  He will see me in 6 months.    [EKG obtained to assist in diagnosis and management of assessed problem(s)] : EKG obtained to assist in diagnosis and management of assessed problem(s)

## 2024-04-16 NOTE — PHYSICAL EXAM
[General Appearance - Well Developed] : well developed [Normal Appearance] : normal appearance [Well Groomed] : well groomed [General Appearance - Well Nourished] : well nourished [No Deformities] : no deformities [General Appearance - In No Acute Distress] : no acute distress [Normal Conjunctiva] : the conjunctiva exhibited no abnormalities [Normal Oral Mucosa] : normal oral mucosa [Normal Jugular Venous A Waves Present] : normal jugular venous A waves present [Normal Jugular Venous V Waves Present] : normal jugular venous V waves present [No Jugular Venous Wilkes A Waves] : no jugular venous wilkes A waves [Respiration, Rhythm And Depth] : normal respiratory rhythm and effort [Auscultation Breath Sounds / Voice Sounds] : lungs were clear to auscultation bilaterally [Exaggerated Use Of Accessory Muscles For Inspiration] : no accessory muscle use [Bowel Sounds] : normal bowel sounds [Abdomen Soft] : soft [Abdomen Tenderness] : non-tender [Abnormal Walk] : normal gait [Gait - Sufficient For Exercise Testing] : the gait was sufficient for exercise testing [Nail Clubbing] : no clubbing of the fingernails [Cyanosis, Localized] : no localized cyanosis [Skin Color & Pigmentation] : normal skin color and pigmentation [Skin Turgor] : normal skin turgor [] : no rash [Oriented To Time, Place, And Person] : oriented to person, place, and time [Impaired Insight] : insight and judgment were intact [No Anxiety] : not feeling anxious [Normal Rate] : normal [Normal S1] : normal S1 [Normal S2] : normal S2 [No Murmur] : no murmurs heard [2+] : left 2+ [1+] : left 1+ [No Abnormalities] : the abdominal aorta was not enlarged and no bruit was heard [___+] : [unfilled]U+ pretibial pitting edema on the right [S3] : no S3 [S4] : no S4 [Right Carotid Bruit] : no bruit heard over the right carotid [Left Carotid Bruit] : no bruit heard over the left carotid [Right Femoral Bruit] : no bruit heard over the right femoral artery [Left Femoral Bruit] : no bruit heard over the left femoral artery

## 2024-09-10 ENCOUNTER — APPOINTMENT (OUTPATIENT)
Dept: ORTHOPEDIC SURGERY | Facility: CLINIC | Age: 84
End: 2024-09-10
Payer: MEDICARE

## 2024-09-10 VITALS — HEIGHT: 70 IN | WEIGHT: 241 LBS | BODY MASS INDEX: 34.5 KG/M2

## 2024-09-10 DIAGNOSIS — R26.89 OTHER ABNORMALITIES OF GAIT AND MOBILITY: ICD-10-CM

## 2024-09-10 DIAGNOSIS — M25.562 PAIN IN RIGHT KNEE: ICD-10-CM

## 2024-09-10 DIAGNOSIS — M25.561 PAIN IN RIGHT KNEE: ICD-10-CM

## 2024-09-10 DIAGNOSIS — G89.29 PAIN IN RIGHT KNEE: ICD-10-CM

## 2024-09-10 PROCEDURE — 73562 X-RAY EXAM OF KNEE 3: CPT | Mod: 50

## 2024-09-10 PROCEDURE — 99214 OFFICE O/P EST MOD 30 MIN: CPT | Mod: 25

## 2024-09-10 PROCEDURE — 20611 DRAIN/INJ JOINT/BURSA W/US: CPT | Mod: 50

## 2024-09-10 RX ORDER — HYALURONATE SODIUM 10 MG/ML
25 SYRINGE (ML) INTRAARTICULAR
Refills: 0 | Status: COMPLETED | OUTPATIENT
Start: 2024-09-10

## 2024-09-10 RX ADMIN — Medication MG/2.5ML: at 00:00

## 2024-09-10 NOTE — HISTORY OF PRESENT ILLNESS
[Gradual] : gradual [4] : 4 [Dull/Aching] : dull/aching [Intermittent] : intermittent [Leisure] : leisure [Rest] : rest [Stairs] : stairs [Retired] : Work status: retired [1] : 1 [Other:____] : [unfilled] [de-identified] : The patient has had increased pain in both knees over the past few weeks.  The right knee bothers him more than the left.  He has mild to moderate pain standing and walking.  Pain with stairs and movie sign.  Chronic problems with balance.  Going to occupational therapy twice a week.  Doing home exercises.  He did have good improvement after previous GenVisc injections of both knees [] : Post Surgical Visit: no [FreeTextEntry1] : B Knees  [de-identified] : 2/12/2024 [de-identified] : 2/12/2024

## 2024-09-10 NOTE — IMAGING
[de-identified] : The patient ambulates some mild unsteady gait using a cane  Right knee No swelling.  Mild medial facet and joint line tenderness Passive range of motion 0 to 120 degrees Ligaments are stable Quad strength 4+/5  Left knee No swelling.   Mild medial facet and joint line tenderness Passive range of motion 0 to 120 degrees Ligaments are stable Quad strength 4+/5   Both legs No swelling Calves are soft and nontender [Bilateral] : knee bilaterally [FreeTextEntry9] : Reviewed and interpreted.  Right knee AP standing, lateral, sunrise views-moderate to severe degenerative changes with narrowing of the medial compartment on AP standing view, spurring patellofemoral joint  Reviewed and interpreted.  Left knee AP standing, lateral, sunrise views-moderate degenerative changes of the patellofemoral joint.  Mild degenerative changes of the medial and lateral compartments

## 2024-09-10 NOTE — PROCEDURE
[Large Joint Injection] : Large joint injection [Bilateral] : bilaterally of the [Knee] : knee [Pain] : pain [Alcohol] : alcohol [Betadine] : betadine [Ethyl Chloride sprayed topically] : ethyl chloride sprayed topically [Sterile technique used] : sterile technique used [GenVisc 850 (25mg)] : 25mg of GenVisc 850 [] : Patient tolerated procedure well [Patient was advised to rest the joint(s) for ____ days] : patient was advised to rest the joint(s) for [unfilled] days [Risks, benefits, alternatives discussed / Verbal consent obtained] : the risks benefits, and alternatives have been discussed, and verbal consent was obtained [Altered anatomic landmarks d/t erosive arthritis] : altered anatomic landmarks d/t erosive arthritis [All ultrasound images have been permanently captured and stored accordingly in our picture archiving and communication system] : All ultrasound images have been permanently captured and stored accordingly in our picture archiving and communication system [#1] : series #1 [FreeTextEntry3] : Do not submerge underwater for 24 hours\par

## 2024-09-10 NOTE — REASON FOR VISIT
[FreeTextEntry2] : Increased pain in both knees over the past few weeks Gelsyn injection #1 both knees

## 2024-09-10 NOTE — DISCUSSION/SUMMARY
[de-identified] : If for any reason he develops any significant neurologic changes, weakness lower extremities, loss of bowel bladder control, advised to go to emergency room immediately Continue home exercises The patient will continue OT at [facility] in [place] with Elvie Church p.r.nKimberly Tylenol p.r.n. Continue vascular followup for PVD with Dr. Jones Continue neurology follow-up with Dr. Christian Hale I discussed repeating GenVisc injections both knees. Risks including infection, swelling, stiffness, bleeding in addition to other associated risks with joint injection, benefits and alternatives were discussed with the patient He would like to do this   Impression: Moderate to severe osteoarthritis right knee Mild to moderate osteoarthritis left knee Peripheral vascular disease Balance disorder

## 2024-09-17 ENCOUNTER — APPOINTMENT (OUTPATIENT)
Dept: ORTHOPEDIC SURGERY | Facility: CLINIC | Age: 84
End: 2024-09-17
Payer: MEDICARE

## 2024-09-17 PROCEDURE — 20611 DRAIN/INJ JOINT/BURSA W/US: CPT | Mod: 50

## 2024-09-17 RX ORDER — HYALURONATE SODIUM 10 MG/ML
25 SYRINGE (ML) INTRAARTICULAR
Refills: 0 | Status: COMPLETED | OUTPATIENT
Start: 2024-09-17

## 2024-09-17 RX ADMIN — Medication MG/2.5ML: at 00:00

## 2024-09-17 NOTE — IMAGING
[Bilateral] : knee bilaterally [FreeTextEntry9] : Reviewed and interpreted.  Right knee AP standing, lateral, sunrise views-moderate to severe degenerative changes with narrowing of the medial compartment on AP standing view, spurring patellofemoral joint  Reviewed and interpreted.  Left knee AP standing, lateral, sunrise views-moderate degenerative changes of the patellofemoral joint.  Mild degenerative changes of the medial and lateral compartments [de-identified] : The patient ambulates some mild unsteady gait using a cane  Right knee No swelling.  Mild medial facet and joint line tenderness Passive range of motion 0 to 120 degrees Ligaments are stable Quad strength 4+/5  Left knee No swelling.   Mild medial facet and joint line tenderness Passive range of motion 0 to 120 degrees Ligaments are stable Quad strength 4+/5   Both legs No swelling Calves are soft and nontender

## 2024-09-17 NOTE — HISTORY OF PRESENT ILLNESS
[Gradual] : gradual [4] : 4 [Dull/Aching] : dull/aching [Intermittent] : intermittent [Leisure] : leisure [Rest] : rest [Stairs] : stairs [Retired] : Work status: retired [2] : 2 [Other:____] : [unfilled] [de-identified] : The patient feels a little better after the first GenVisc injections in both knees.  He has mild to moderate pain standing and walking [] : Post Surgical Visit: no [FreeTextEntry1] : B Knees  [de-identified] : 2/12/2024 [de-identified] : 9/10/2024

## 2024-09-17 NOTE — PROCEDURE
[Large Joint Injection] : Large joint injection [Bilateral] : bilaterally of the [Knee] : knee [Pain] : pain [Alcohol] : alcohol [Betadine] : betadine [Ethyl Chloride sprayed topically] : ethyl chloride sprayed topically [Sterile technique used] : sterile technique used [GenVisc 850 (25mg)] : 25mg of GenVisc 850 [] : Patient tolerated procedure well [Patient was advised to rest the joint(s) for ____ days] : patient was advised to rest the joint(s) for [unfilled] days [Risks, benefits, alternatives discussed / Verbal consent obtained] : the risks benefits, and alternatives have been discussed, and verbal consent was obtained [Altered anatomic landmarks d/t erosive arthritis] : altered anatomic landmarks d/t erosive arthritis [All ultrasound images have been permanently captured and stored accordingly in our picture archiving and communication system] : All ultrasound images have been permanently captured and stored accordingly in our picture archiving and communication system [#2] : series #2 [FreeTextEntry3] : Do not submerge underwater for 24 hours\par

## 2024-09-17 NOTE — DISCUSSION/SUMMARY
[de-identified] : If for any reason he develops any significant neurologic changes, weakness lower extremities, loss of bowel bladder control, advised to go to emergency room immediately Continue home exercises The patient will continue OT at [facility] in [place] with Elvie morerdebby Lino p.r.n. Continue vascular followup for PVD with Dr. Jones Continue neurology follow-up with Dr. Christian Hale   Impression: Moderate to severe osteoarthritis right knee Mild to moderate osteoarthritis left knee Peripheral vascular disease Balance disorder

## 2024-09-24 ENCOUNTER — APPOINTMENT (OUTPATIENT)
Dept: ORTHOPEDIC SURGERY | Facility: CLINIC | Age: 84
End: 2024-09-24
Payer: MEDICARE

## 2024-09-24 PROCEDURE — 20611 DRAIN/INJ JOINT/BURSA W/US: CPT | Mod: 50

## 2024-09-24 RX ORDER — HYALURONATE SODIUM 10 MG/ML
25 SYRINGE (ML) INTRAARTICULAR
Refills: 0 | Status: COMPLETED | OUTPATIENT
Start: 2024-09-24

## 2024-09-24 RX ADMIN — Medication MG/2.5ML: at 00:00

## 2024-09-24 NOTE — IMAGING
[de-identified] : The patient ambulates some mild unsteady gait using a cane  Right knee No swelling.  Mild medial facet and joint line tenderness Passive range of motion 0 to 120 degrees   Left knee No swelling.   Mild medial facet and joint line tenderness Passive range of motion 0 to 120 degrees   Both legs No swelling Calves are soft and nontender

## 2024-09-24 NOTE — DISCUSSION/SUMMARY
[de-identified] : If for any reason he develops any significant neurologic changes, weakness lower extremities, loss of bowel bladder control, advised to go to emergency room immediately Continue home exercises The patient will continue OT at [facility] in [place] with Elvie morerdebby Lino p.r.n. Continue vascular followup for PVD with Dr. Jones Continue neurology follow-up with Dr. Christian Hale or associate   Impression: Moderate to severe osteoarthritis right knee Mild to moderate osteoarthritis left knee Peripheral vascular disease Balance disorder

## 2024-09-24 NOTE — PROCEDURE
[Large Joint Injection] : Large joint injection [Bilateral] : bilaterally of the [Knee] : knee [Pain] : pain [Alcohol] : alcohol [Betadine] : betadine [Ethyl Chloride sprayed topically] : ethyl chloride sprayed topically [Sterile technique used] : sterile technique used [GenVisc 850 (25mg)] : 25mg of GenVisc 850 [] : Patient tolerated procedure well [Patient was advised to rest the joint(s) for ____ days] : patient was advised to rest the joint(s) for [unfilled] days [Risks, benefits, alternatives discussed / Verbal consent obtained] : the risks benefits, and alternatives have been discussed, and verbal consent was obtained [Altered anatomic landmarks d/t erosive arthritis] : altered anatomic landmarks d/t erosive arthritis [All ultrasound images have been permanently captured and stored accordingly in our picture archiving and communication system] : All ultrasound images have been permanently captured and stored accordingly in our picture archiving and communication system [#3] : series #3 [FreeTextEntry3] : Do not submerge underwater for 24 hours\par

## 2024-09-24 NOTE — HISTORY OF PRESENT ILLNESS
[Gradual] : gradual [4] : 4 [Dull/Aching] : dull/aching [Intermittent] : intermittent [Leisure] : leisure [Rest] : rest [Stairs] : stairs [Retired] : Work status: retired [3] : 3 [Other:____] : [unfilled] [de-identified] : The patient feels a little better after the second GenVisc injections in both knees.  He has mild to moderate pain standing and walking [] : Post Surgical Visit: no [FreeTextEntry1] : B Knees  [de-identified] : 9/17/24 [de-identified] : Dr. Brooks  [de-identified] : 9/17/24 [de-identified] : Audie knees

## 2024-10-01 ENCOUNTER — APPOINTMENT (OUTPATIENT)
Dept: ORTHOPEDIC SURGERY | Facility: CLINIC | Age: 84
End: 2024-10-01
Payer: MEDICARE

## 2024-10-01 VITALS — BODY MASS INDEX: 34.5 KG/M2 | WEIGHT: 241 LBS | HEIGHT: 70 IN

## 2024-10-01 PROCEDURE — 20611 DRAIN/INJ JOINT/BURSA W/US: CPT | Mod: 50

## 2024-10-01 RX ORDER — HYALURONATE SODIUM 10 MG/ML
25 SYRINGE (ML) INTRAARTICULAR
Refills: 0 | Status: COMPLETED | OUTPATIENT
Start: 2024-10-01

## 2024-10-01 RX ADMIN — Medication MG/2.5ML: at 00:00

## 2024-10-01 NOTE — DISCUSSION/SUMMARY
[de-identified] : If for any reason he develops any significant neurologic changes, weakness lower extremities, loss of bowel bladder control, advised to go to emergency room immediately Continue home exercises The patient will continue OT at [facility] in [place] with Elvie morerdebby Lino p.r.n. Continue vascular followup for PVD with Dr. Jones Continue neurology follow-up with Dr. Christian Hale or associate   Impression: Moderate to severe osteoarthritis right knee Mild to moderate osteoarthritis left knee Peripheral vascular disease Balance disorder

## 2024-10-01 NOTE — PROCEDURE
[Large Joint Injection] : Large joint injection [Bilateral] : bilaterally of the [Knee] : knee [Pain] : pain [Alcohol] : alcohol [Betadine] : betadine [Ethyl Chloride sprayed topically] : ethyl chloride sprayed topically [Sterile technique used] : sterile technique used [GenVisc 850 (25mg)] : 25mg of GenVisc 850 [] : Patient tolerated procedure well [Patient was advised to rest the joint(s) for ____ days] : patient was advised to rest the joint(s) for [unfilled] days [Risks, benefits, alternatives discussed / Verbal consent obtained] : the risks benefits, and alternatives have been discussed, and verbal consent was obtained [Altered anatomic landmarks d/t erosive arthritis] : altered anatomic landmarks d/t erosive arthritis [All ultrasound images have been permanently captured and stored accordingly in our picture archiving and communication system] : All ultrasound images have been permanently captured and stored accordingly in our picture archiving and communication system [#4] : series #4 [FreeTextEntry3] : Do not submerge underwater for 24 hours\par

## 2024-10-01 NOTE — HISTORY OF PRESENT ILLNESS
[Gradual] : gradual [Dull/Aching] : dull/aching [Intermittent] : intermittent [Leisure] : leisure [Rest] : rest [Stairs] : stairs [Retired] : Work status: retired [4] : 4 [Other:____] : [unfilled] [de-identified] : The patient feels a little better after the third GenVisc injections in both knees.  He has mild to moderate pain standing and walking [] : Post Surgical Visit: no [FreeTextEntry1] : B Knees  [de-identified] : 9/17/24 [de-identified] : Dr. Brooks  [de-identified] : 9/24/24 [de-identified] : Audie knees

## 2024-10-01 NOTE — IMAGING
[de-identified] : The patient ambulates some mild unsteady gait using a cane  Right knee No swelling.  Mild medial facet and joint line tenderness Passive range of motion 0 to 120 degrees   Left knee No swelling.   Mild medial facet and joint line tenderness Passive range of motion 0 to 120 degrees   Both legs No swelling Calves are soft and nontender

## 2024-10-08 ENCOUNTER — APPOINTMENT (OUTPATIENT)
Dept: ORTHOPEDIC SURGERY | Facility: CLINIC | Age: 84
End: 2024-10-08
Payer: MEDICARE

## 2024-10-08 VITALS — WEIGHT: 241 LBS | BODY MASS INDEX: 34.5 KG/M2 | HEIGHT: 70 IN

## 2024-10-08 DIAGNOSIS — S39.012D STRAIN OF MUSCLE, FASCIA AND TENDON OF LOWER BACK, SUBSEQUENT ENCOUNTER: ICD-10-CM

## 2024-10-08 DIAGNOSIS — M47.817 SPONDYLOSIS W/OUT MYELOPATHY OR RADICULOPATHY, LUMBOSACRAL REGION: ICD-10-CM

## 2024-10-08 DIAGNOSIS — M17.0 BILATERAL PRIMARY OSTEOARTHRITIS OF KNEE: ICD-10-CM

## 2024-10-08 DIAGNOSIS — M48.07 SPINAL STENOSIS, LUMBOSACRAL REGION: ICD-10-CM

## 2024-10-08 DIAGNOSIS — M16.11 UNILATERAL PRIMARY OSTEOARTHRITIS, RIGHT HIP: ICD-10-CM

## 2024-10-08 PROCEDURE — 20611 DRAIN/INJ JOINT/BURSA W/US: CPT | Mod: 50

## 2024-10-08 PROCEDURE — 99213 OFFICE O/P EST LOW 20 MIN: CPT | Mod: 25

## 2024-10-08 RX ORDER — HYALURONATE SODIUM 10 MG/ML
25 SYRINGE (ML) INTRAARTICULAR
Refills: 0 | Status: COMPLETED | OUTPATIENT
Start: 2024-10-08

## 2024-10-08 RX ADMIN — Medication MG/2.5ML: at 00:00

## 2024-10-24 ENCOUNTER — APPOINTMENT (OUTPATIENT)
Dept: CARDIOLOGY | Facility: CLINIC | Age: 84
End: 2024-10-24
Payer: MEDICARE

## 2024-10-24 ENCOUNTER — NON-APPOINTMENT (OUTPATIENT)
Age: 84
End: 2024-10-24

## 2024-10-24 VITALS
HEIGHT: 70 IN | SYSTOLIC BLOOD PRESSURE: 185 MMHG | OXYGEN SATURATION: 96 % | HEART RATE: 70 BPM | BODY MASS INDEX: 35.93 KG/M2 | WEIGHT: 251 LBS | DIASTOLIC BLOOD PRESSURE: 72 MMHG

## 2024-10-24 VITALS — DIASTOLIC BLOOD PRESSURE: 85 MMHG | SYSTOLIC BLOOD PRESSURE: 160 MMHG

## 2024-10-24 DIAGNOSIS — I44.7 LEFT BUNDLE-BRANCH BLOCK, UNSPECIFIED: ICD-10-CM

## 2024-10-24 DIAGNOSIS — I49.3 VENTRICULAR PREMATURE DEPOLARIZATION: ICD-10-CM

## 2024-10-24 PROCEDURE — 99214 OFFICE O/P EST MOD 30 MIN: CPT

## 2024-10-24 PROCEDURE — 93000 ELECTROCARDIOGRAM COMPLETE: CPT

## 2024-10-24 PROCEDURE — G2211 COMPLEX E/M VISIT ADD ON: CPT

## 2024-10-31 ENCOUNTER — APPOINTMENT (OUTPATIENT)
Dept: CARDIOLOGY | Facility: CLINIC | Age: 84
End: 2024-10-31
Payer: MEDICARE

## 2024-10-31 PROCEDURE — 93306 TTE W/DOPPLER COMPLETE: CPT

## 2024-12-20 NOTE — PATIENT PROFILE ADULT. - ACCEPTABLE
Appointment Request  (Newest Message First)  Viviana Zhou routed conversation to [facility] Dbyhbv68 minutes ago (1:21 PM)     Ruth REYES [facility] [place]  Staff2 hours ago (11:22 AM)     OD  Appointment Request From: Ruth Grissom     With Provider: Dr. Damir Nguyen MD [[facility] CARDIOLOG[facility]]     Preferred Date Range: 1/8/2025 - 1/16/2025     Preferred Times: Any Time     Reason for visit: Request an Appointment     Comments:  High pulse rate and need a referral    6

## 2024-12-29 NOTE — ED ADULT NURSE NOTE - SUICIDE SCREENING DEPRESSION
Patient unable to complete
Hypercholesterolemia Monitoring: I explained this is common when taking isotretinoin. We will monitor closely.
Negative

## 2025-01-31 ENCOUNTER — NON-APPOINTMENT (OUTPATIENT)
Age: 85
End: 2025-01-31

## 2025-01-31 ENCOUNTER — APPOINTMENT (OUTPATIENT)
Dept: CARDIOLOGY | Facility: CLINIC | Age: 85
End: 2025-01-31
Payer: MEDICARE

## 2025-01-31 VITALS — SYSTOLIC BLOOD PRESSURE: 140 MMHG | DIASTOLIC BLOOD PRESSURE: 70 MMHG

## 2025-01-31 VITALS
HEART RATE: 74 BPM | HEIGHT: 70 IN | BODY MASS INDEX: 35.22 KG/M2 | DIASTOLIC BLOOD PRESSURE: 78 MMHG | WEIGHT: 246 LBS | OXYGEN SATURATION: 96 % | SYSTOLIC BLOOD PRESSURE: 170 MMHG

## 2025-01-31 DIAGNOSIS — I44.7 LEFT BUNDLE-BRANCH BLOCK, UNSPECIFIED: ICD-10-CM

## 2025-01-31 DIAGNOSIS — I10 ESSENTIAL (PRIMARY) HYPERTENSION: ICD-10-CM

## 2025-01-31 PROCEDURE — G2211 COMPLEX E/M VISIT ADD ON: CPT

## 2025-01-31 PROCEDURE — 99214 OFFICE O/P EST MOD 30 MIN: CPT

## 2025-01-31 PROCEDURE — 93000 ELECTROCARDIOGRAM COMPLETE: CPT

## 2025-02-12 NOTE — ED ADULT TRIAGE NOTE - HEIGHT IN FEET
Copied from CRM #[id number]. Topic: MW Schedule Appointment - MW Schedule Adult Specialty  >> Feb 11, 2025  3:45 PM Eveline WHEELER wrote:  Abigail Dallas called to schedule an appointment with a specialist (who has not been converted to enhanced scheduling).    [facility] schedules; Unable to complete scheduling due to request to send message for next day appointments.  >Selected 'Wrap Up CRM'  >Created new Telephone Encounter after clicking 'Convert to Clinical Call'.  >Selected reason for call 'Appointment'.  >Sent Appointment template and routed as routine priority per Clinician KB page to appropriate clinician pool.    -- DO NOT REPLY / DO NOT REPLY ALL --  -- This inbox is not monitored. If this was sent to the wrong provider or department, reroute message to P [facility] Reroute pool. --  -- Message is from Engagement Center Operations ([facility]) --  Reason for Appointment Message: [employer] (KB) instructs [facility] not to schedule    Reason for Visit: Generalized abdominal pain [R10.84]- Appointment for tomorrow possibly ( urgent referral)      Is the patient currently scheduled? No    Preferred time to be seen: Afternoons    Caller Information       Contact Date/Time Type Contact Phone/Fax    02/11/2025 03:38 PM CST Phone (Incoming) Abigail Dallas [phone]            Alternative phone number: N/a    Can a detailed message be left?  Yes - Voicemail   Patient has been advised the message will be addressed within 2-3 business days           
Patient scheduled appointment.   
5

## 2025-03-11 ENCOUNTER — APPOINTMENT (OUTPATIENT)
Dept: ORTHOPEDIC SURGERY | Facility: CLINIC | Age: 85
End: 2025-03-11
Payer: MEDICARE

## 2025-03-11 VITALS — WEIGHT: 246 LBS | BODY MASS INDEX: 35.22 KG/M2 | HEIGHT: 70 IN

## 2025-03-11 DIAGNOSIS — R26.89 OTHER ABNORMALITIES OF GAIT AND MOBILITY: ICD-10-CM

## 2025-03-11 DIAGNOSIS — M25.561 PAIN IN RIGHT KNEE: ICD-10-CM

## 2025-03-11 DIAGNOSIS — M25.562 PAIN IN RIGHT KNEE: ICD-10-CM

## 2025-03-11 DIAGNOSIS — M17.0 BILATERAL PRIMARY OSTEOARTHRITIS OF KNEE: ICD-10-CM

## 2025-03-11 DIAGNOSIS — G89.29 PAIN IN RIGHT KNEE: ICD-10-CM

## 2025-03-11 PROCEDURE — 99214 OFFICE O/P EST MOD 30 MIN: CPT

## 2025-04-08 ENCOUNTER — APPOINTMENT (OUTPATIENT)
Dept: PAIN MANAGEMENT | Facility: CLINIC | Age: 85
End: 2025-04-08
Payer: MEDICARE

## 2025-04-08 DIAGNOSIS — M54.16 RADICULOPATHY, LUMBAR REGION: ICD-10-CM

## 2025-04-08 PROCEDURE — 99204 OFFICE O/P NEW MOD 45 MIN: CPT

## 2025-04-09 ENCOUNTER — TRANSCRIPTION ENCOUNTER (OUTPATIENT)
Age: 85
End: 2025-04-09

## 2025-04-10 ENCOUNTER — APPOINTMENT (OUTPATIENT)
Dept: ORTHOPEDIC SURGERY | Facility: HOSPITAL | Age: 85
End: 2025-04-10

## 2025-04-15 ENCOUNTER — APPOINTMENT (OUTPATIENT)
Dept: ORTHOPEDIC SURGERY | Facility: CLINIC | Age: 85
End: 2025-04-15
Payer: MEDICARE

## 2025-04-15 DIAGNOSIS — M47.817 SPONDYLOSIS W/OUT MYELOPATHY OR RADICULOPATHY, LUMBOSACRAL REGION: ICD-10-CM

## 2025-04-15 DIAGNOSIS — S39.012D STRAIN OF MUSCLE, FASCIA AND TENDON OF LOWER BACK, SUBSEQUENT ENCOUNTER: ICD-10-CM

## 2025-04-15 PROCEDURE — 20611 DRAIN/INJ JOINT/BURSA W/US: CPT | Mod: 50

## 2025-04-15 PROCEDURE — 99213 OFFICE O/P EST LOW 20 MIN: CPT | Mod: 25

## 2025-04-15 RX ORDER — HYALURONATE SODIUM 10 MG/ML
25 SYRINGE (ML) INTRAARTICULAR
Refills: 0 | Status: COMPLETED | OUTPATIENT
Start: 2025-04-15

## 2025-04-15 RX ADMIN — Medication MG/2.5ML: at 00:00

## 2025-04-22 ENCOUNTER — APPOINTMENT (OUTPATIENT)
Dept: ORTHOPEDIC SURGERY | Facility: CLINIC | Age: 85
End: 2025-04-22
Payer: MEDICARE

## 2025-04-22 ENCOUNTER — APPOINTMENT (OUTPATIENT)
Dept: MRI IMAGING | Facility: CLINIC | Age: 85
End: 2025-04-22
Payer: MEDICARE

## 2025-04-22 PROCEDURE — 72148 MRI LUMBAR SPINE W/O DYE: CPT

## 2025-04-22 PROCEDURE — 20611 DRAIN/INJ JOINT/BURSA W/US: CPT | Mod: 50

## 2025-04-22 RX ORDER — HYALURONATE SODIUM 10 MG/ML
25 SYRINGE (ML) INTRAARTICULAR
Refills: 0 | Status: COMPLETED | OUTPATIENT
Start: 2025-04-22

## 2025-04-22 RX ADMIN — Medication MG/2.5ML: at 00:00

## 2025-04-23 ENCOUNTER — APPOINTMENT (OUTPATIENT)
Dept: PAIN MANAGEMENT | Facility: CLINIC | Age: 85
End: 2025-04-23

## 2025-04-24 ENCOUNTER — NON-APPOINTMENT (OUTPATIENT)
Age: 85
End: 2025-04-24

## 2025-04-29 ENCOUNTER — APPOINTMENT (OUTPATIENT)
Dept: ORTHOPEDIC SURGERY | Facility: CLINIC | Age: 85
End: 2025-04-29

## 2025-04-29 VITALS — HEIGHT: 70 IN | BODY MASS INDEX: 35.22 KG/M2 | WEIGHT: 246 LBS

## 2025-04-29 DIAGNOSIS — M48.07 SPINAL STENOSIS, LUMBOSACRAL REGION: ICD-10-CM

## 2025-04-29 DIAGNOSIS — S39.012D STRAIN OF MUSCLE, FASCIA AND TENDON OF LOWER BACK, SUBSEQUENT ENCOUNTER: ICD-10-CM

## 2025-04-29 DIAGNOSIS — M17.0 BILATERAL PRIMARY OSTEOARTHRITIS OF KNEE: ICD-10-CM

## 2025-04-29 DIAGNOSIS — M47.817 SPONDYLOSIS W/OUT MYELOPATHY OR RADICULOPATHY, LUMBOSACRAL REGION: ICD-10-CM

## 2025-04-29 DIAGNOSIS — R93.7 ABNORMAL FINDINGS ON DIAGNOSTIC IMAGING OF OTHER PARTS OF MUSCULOSKELETAL SYSTEM: ICD-10-CM

## 2025-04-29 PROCEDURE — 99214 OFFICE O/P EST MOD 30 MIN: CPT | Mod: 25

## 2025-04-29 PROCEDURE — 20611 DRAIN/INJ JOINT/BURSA W/US: CPT | Mod: 50

## 2025-04-29 RX ORDER — HYALURONATE SODIUM 10 MG/ML
25 SYRINGE (ML) INTRAARTICULAR
Refills: 0 | Status: COMPLETED | OUTPATIENT
Start: 2025-04-29

## 2025-04-29 RX ADMIN — Medication MG/2.5ML: at 00:00

## 2025-05-13 ENCOUNTER — APPOINTMENT (OUTPATIENT)
Dept: ORTHOPEDIC SURGERY | Facility: CLINIC | Age: 85
End: 2025-05-13
Payer: MEDICARE

## 2025-05-13 PROCEDURE — 20611 DRAIN/INJ JOINT/BURSA W/US: CPT | Mod: 50

## 2025-05-13 RX ORDER — HYALURONATE SODIUM 10 MG/ML
25 SYRINGE (ML) INTRAARTICULAR
Refills: 0 | Status: COMPLETED | OUTPATIENT
Start: 2025-05-13

## 2025-05-13 RX ADMIN — Medication MG/2.5ML: at 00:00

## 2025-05-14 ENCOUNTER — APPOINTMENT (OUTPATIENT)
Dept: PAIN MANAGEMENT | Facility: CLINIC | Age: 85
End: 2025-05-14
Payer: MEDICARE

## 2025-05-14 VITALS — WEIGHT: 249 LBS | HEIGHT: 70 IN | BODY MASS INDEX: 35.65 KG/M2

## 2025-05-14 DIAGNOSIS — M48.07 SPINAL STENOSIS, LUMBOSACRAL REGION: ICD-10-CM

## 2025-05-14 DIAGNOSIS — M47.817 SPONDYLOSIS W/OUT MYELOPATHY OR RADICULOPATHY, LUMBOSACRAL REGION: ICD-10-CM

## 2025-05-14 PROCEDURE — 99214 OFFICE O/P EST MOD 30 MIN: CPT

## 2025-05-20 ENCOUNTER — APPOINTMENT (OUTPATIENT)
Dept: ORTHOPEDIC SURGERY | Facility: CLINIC | Age: 85
End: 2025-05-20
Payer: MEDICARE

## 2025-05-20 VITALS — BODY MASS INDEX: 35.65 KG/M2 | WEIGHT: 249 LBS | HEIGHT: 70 IN

## 2025-05-20 DIAGNOSIS — M17.0 BILATERAL PRIMARY OSTEOARTHRITIS OF KNEE: ICD-10-CM

## 2025-05-20 PROCEDURE — 20611 DRAIN/INJ JOINT/BURSA W/US: CPT | Mod: 50

## 2025-05-20 RX ORDER — HYALURONATE SODIUM 10 MG/ML
25 SYRINGE (ML) INTRAARTICULAR
Refills: 0 | Status: COMPLETED | OUTPATIENT
Start: 2025-05-20

## 2025-05-20 RX ADMIN — Medication MG/2.5ML: at 00:00

## 2025-06-03 ENCOUNTER — APPOINTMENT (OUTPATIENT)
Dept: PAIN MANAGEMENT | Facility: CLINIC | Age: 85
End: 2025-06-03
Payer: MEDICARE

## 2025-06-03 DIAGNOSIS — M48.07 SPINAL STENOSIS, LUMBOSACRAL REGION: ICD-10-CM

## 2025-06-03 PROCEDURE — 62323 NJX INTERLAMINAR LMBR/SAC: CPT

## 2025-06-04 NOTE — PROGRESS NOTE ADULT - PROBLEM SELECTOR PLAN 3
DISPLAY PLAN FREE TEXT DISPLAY PLAN FREE TEXT S/p L2-5 posterior lumbar laminectomy on 8-16-18  - management as per ortho  - would avoid opioids as much as possible given pt's ileus.   - Recommend IV vs. oral acetaminophen for further pain control DISPLAY PLAN FREE TEXT DISPLAY PLAN FREE TEXT DISPLAY PLAN FREE TEXT DISPLAY PLAN FREE TEXT DISPLAY PLAN FREE TEXT DISPLAY PLAN FREE TEXT DISPLAY PLAN FREE TEXT DISPLAY PLAN FREE TEXT DISPLAY PLAN FREE TEXT

## 2025-06-18 ENCOUNTER — APPOINTMENT (OUTPATIENT)
Dept: PAIN MANAGEMENT | Facility: CLINIC | Age: 85
End: 2025-06-18
Payer: MEDICARE

## 2025-06-18 VITALS — BODY MASS INDEX: 35.79 KG/M2 | WEIGHT: 250 LBS | HEIGHT: 70 IN

## 2025-06-18 PROCEDURE — 99214 OFFICE O/P EST MOD 30 MIN: CPT

## 2025-07-01 ENCOUNTER — APPOINTMENT (OUTPATIENT)
Dept: PAIN MANAGEMENT | Facility: CLINIC | Age: 85
End: 2025-07-01
Payer: MEDICARE

## 2025-07-01 PROCEDURE — J3490M: CUSTOM | Mod: NC

## 2025-07-01 PROCEDURE — 64494 INJ PARAVERT F JNT L/S 2 LEV: CPT | Mod: 59,RT

## 2025-07-01 PROCEDURE — 64493 INJ PARAVERT F JNT L/S 1 LEV: CPT | Mod: RT

## 2025-07-01 PROCEDURE — 64495 INJ PARAVERT F JNT L/S 3 LEV: CPT | Mod: 59,RT

## 2025-07-16 ENCOUNTER — APPOINTMENT (OUTPATIENT)
Dept: PAIN MANAGEMENT | Facility: CLINIC | Age: 85
End: 2025-07-16
Payer: MEDICARE

## 2025-07-16 VITALS — WEIGHT: 250 LBS | BODY MASS INDEX: 35.79 KG/M2 | HEIGHT: 70 IN

## 2025-07-16 PROCEDURE — 99214 OFFICE O/P EST MOD 30 MIN: CPT

## 2025-07-29 ENCOUNTER — APPOINTMENT (OUTPATIENT)
Dept: CARDIOLOGY | Facility: CLINIC | Age: 85
End: 2025-07-29
Payer: MEDICARE

## 2025-07-29 VITALS — DIASTOLIC BLOOD PRESSURE: 80 MMHG | SYSTOLIC BLOOD PRESSURE: 135 MMHG

## 2025-07-29 VITALS
HEIGHT: 70 IN | WEIGHT: 234 LBS | SYSTOLIC BLOOD PRESSURE: 173 MMHG | HEART RATE: 68 BPM | BODY MASS INDEX: 33.5 KG/M2 | DIASTOLIC BLOOD PRESSURE: 83 MMHG | OXYGEN SATURATION: 95 %

## 2025-07-29 DIAGNOSIS — I10 ESSENTIAL (PRIMARY) HYPERTENSION: ICD-10-CM

## 2025-07-29 PROCEDURE — G2211 COMPLEX E/M VISIT ADD ON: CPT

## 2025-07-29 PROCEDURE — 99214 OFFICE O/P EST MOD 30 MIN: CPT

## 2025-07-29 PROCEDURE — 93000 ELECTROCARDIOGRAM COMPLETE: CPT

## 2025-07-30 LAB
ALBUMIN SERPL ELPH-MCNC: 4.4 G/DL
ALP BLD-CCNC: 101 U/L
ALT SERPL-CCNC: 26 U/L
ANION GAP SERPL CALC-SCNC: 14 MMOL/L
AST SERPL-CCNC: 36 U/L
BILIRUB SERPL-MCNC: 0.6 MG/DL
BUN SERPL-MCNC: 26 MG/DL
CALCIUM SERPL-MCNC: 9.9 MG/DL
CHLORIDE SERPL-SCNC: 106 MMOL/L
CHOLEST SERPL-MCNC: 164 MG/DL
CO2 SERPL-SCNC: 24 MMOL/L
CREAT SERPL-MCNC: 1.35 MG/DL
EGFRCR SERPLBLD CKD-EPI 2021: 52 ML/MIN/1.73M2
ESTIMATED AVERAGE GLUCOSE: 128 MG/DL
GLUCOSE SERPL-MCNC: 109 MG/DL
HBA1C MFR BLD HPLC: 6.1 %
HCT VFR BLD CALC: 41 %
HDLC SERPL-MCNC: 43 MG/DL
HGB BLD-MCNC: 13.1 G/DL
LDLC SERPL-MCNC: 100 MG/DL
MCHC RBC-ENTMCNC: 30.1 PG
MCHC RBC-ENTMCNC: 32 G/DL
MCV RBC AUTO: 94.3 FL
NONHDLC SERPL-MCNC: 121 MG/DL
PLATELET # BLD AUTO: 180 K/UL
POTASSIUM SERPL-SCNC: 5.1 MMOL/L
PROT SERPL-MCNC: 6.6 G/DL
RBC # BLD: 4.35 M/UL
RBC # FLD: 14.2 %
SODIUM SERPL-SCNC: 144 MMOL/L
TRIGL SERPL-MCNC: 115 MG/DL
TSH SERPL-ACNC: 2.85 UIU/ML
WBC # FLD AUTO: 7.31 K/UL

## 2025-08-04 ENCOUNTER — APPOINTMENT (OUTPATIENT)
Dept: PAIN MANAGEMENT | Facility: CLINIC | Age: 85
End: 2025-08-04
Payer: MEDICARE

## 2025-08-04 DIAGNOSIS — M47.817 SPONDYLOSIS W/OUT MYELOPATHY OR RADICULOPATHY, LUMBOSACRAL REGION: ICD-10-CM

## 2025-08-04 PROCEDURE — 64493 INJ PARAVERT F JNT L/S 1 LEV: CPT | Mod: RT

## 2025-08-04 PROCEDURE — 64495 INJ PARAVERT F JNT L/S 3 LEV: CPT | Mod: 59,RT

## 2025-08-04 PROCEDURE — 64494 INJ PARAVERT F JNT L/S 2 LEV: CPT | Mod: 59,RT

## 2025-08-20 ENCOUNTER — APPOINTMENT (OUTPATIENT)
Dept: PAIN MANAGEMENT | Facility: CLINIC | Age: 85
End: 2025-08-20
Payer: MEDICARE

## 2025-08-20 VITALS — HEIGHT: 70 IN | BODY MASS INDEX: 33.07 KG/M2 | WEIGHT: 231 LBS

## 2025-08-20 DIAGNOSIS — M47.817 SPONDYLOSIS W/OUT MYELOPATHY OR RADICULOPATHY, LUMBOSACRAL REGION: ICD-10-CM

## 2025-08-20 PROCEDURE — 99214 OFFICE O/P EST MOD 30 MIN: CPT

## 2025-09-15 ENCOUNTER — APPOINTMENT (OUTPATIENT)
Dept: PAIN MANAGEMENT | Facility: CLINIC | Age: 85
End: 2025-09-15
Payer: MEDICARE

## 2025-09-15 DIAGNOSIS — M47.817 SPONDYLOSIS W/OUT MYELOPATHY OR RADICULOPATHY, LUMBOSACRAL REGION: ICD-10-CM

## 2025-09-15 PROCEDURE — 64636Z: CUSTOM | Mod: RT,59

## 2025-09-15 PROCEDURE — 64635 DESTROY LUMB/SAC FACET JNT: CPT | Mod: RT
